# Patient Record
Sex: FEMALE | Race: WHITE | Employment: UNEMPLOYED | URBAN - METROPOLITAN AREA
[De-identification: names, ages, dates, MRNs, and addresses within clinical notes are randomized per-mention and may not be internally consistent; named-entity substitution may affect disease eponyms.]

---

## 2017-01-31 ENCOUNTER — OFFICE VISIT (OUTPATIENT)
Dept: FAMILY MEDICINE CLINIC | Age: 57
End: 2017-01-31

## 2017-01-31 VITALS
SYSTOLIC BLOOD PRESSURE: 126 MMHG | BODY MASS INDEX: 38.95 KG/M2 | WEIGHT: 242.38 LBS | HEIGHT: 66 IN | HEART RATE: 60 BPM | OXYGEN SATURATION: 98 % | TEMPERATURE: 98.1 F | RESPIRATION RATE: 16 BRPM | DIASTOLIC BLOOD PRESSURE: 80 MMHG

## 2017-01-31 DIAGNOSIS — Z98.84 HX OF GASTRIC BYPASS: ICD-10-CM

## 2017-01-31 DIAGNOSIS — M25.552 LEFT HIP PAIN: Primary | ICD-10-CM

## 2017-01-31 DIAGNOSIS — Z12.11 SCREEN FOR COLON CANCER: ICD-10-CM

## 2017-01-31 DIAGNOSIS — Z23 ENCOUNTER FOR IMMUNIZATION: ICD-10-CM

## 2017-01-31 RX ORDER — METOPROLOL SUCCINATE 25 MG/1
TABLET, EXTENDED RELEASE ORAL
COMMUNITY
Start: 2016-10-26 | End: 2017-01-31 | Stop reason: ALTCHOICE

## 2017-01-31 RX ORDER — ROSUVASTATIN CALCIUM 5 MG/1
TABLET, COATED ORAL
COMMUNITY
Start: 2016-12-07 | End: 2017-10-24 | Stop reason: SDUPTHER

## 2017-01-31 NOTE — PATIENT INSTRUCTIONS
From Natural Medicines Comprehensive Database:     Turmeric safe up to 2.2 grams daily in trials lasting up to 8 months     Commercial products Meriva, Indena 500 mg twice daily reduced pain and improved functionality in OA of the knee after 2-3 months of treatment and reduced need for NSAIDs. May be as effective as ibuprofen 400 mg twice daily.     Dyspepsia, diarrhea, distension , reflux, nausea and vomiting.

## 2017-01-31 NOTE — MR AVS SNAPSHOT
Visit Information Date & Time Provider Department Dept. Phone Encounter #  
 1/31/2017  1:15 PM Jaya Conrad MD [facility] [phone] [id number] Follow-up Instructions Return if symptoms worsen or fail to improve. Routing History Upcoming Health Maintenance Date Due  
 BREAST CANCER SCRN MAMMOGRAM 12/4/2008 FOBT Q 1 YEAR AGE 50-75 12/4/2008 INFLUENZA AGE 9 TO ADULT 8/1/2016 PAP AKA CERVICAL CYTOLOGY 10/1/2020 DTaP/Tdap/Td series (2 - Td) 3/6/2024 Allergies as of 1/31/2017  Review Complete On: 1/31/2017 By: Jaya Conrad MD  
  
 Severity Noted Reaction Type Reactions Morphine (Pf)  06/11/2013    Unknown (comments) Increases Pain- MAKES PAIN WORST Pcn [Penicillins]  06/11/2013    Unknown (comments) Unknown reaction. Current Immunizations  Reviewed on 10/1/2015 Name Date Influenza Vaccine 9/15/2012 Influenza Vaccine (Quad) PF 10/1/2015 Tdap 3/6/2014 Not reviewed this visit You Were Diagnosed With   
  
 Codes Comments Left hip pain    -  Primary ICD-10-CM: X36.456 ICD-9-CM: 719.45 Screen for colon cancer     ICD-10-CM: Z12.11 ICD-9-CM: V76.51 Vitals BP Pulse Temp Resp Height(growth percentile) 126/80 (BP 1 Location: Left arm, BP Patient Position: Sitting) 60 98.1 °F (36.7 °C) (Temporal) 16 5' 6\" (1.676 m) Weight(growth percentile) SpO2 BMI OB Status Smoking Status 242 lb 6 oz (109.9 kg) 98% 39.12 kg/m2 Menopause Former Smoker Vitals History BMI and BSA Data Body Mass Index Body Surface Area  
 39.12 kg/m 2 2.26 m 2 Preferred Pharmacy Pharmacy Name Phone [address] [facility] [phone] Your Updated Medication List  
  
   
This list is accurate as of: 1/31/17  2:10 PM.  Always use your most recent med list.  
  
  
  
  
 acetaminophen 500 mg tablet Commonly known as:  TYLENOL  
 Take  by mouth every six (6) hours as needed. acetaminophen-codeine 300-30 mg per tablet Commonly known as:  TYLENOL #3 Take 1 Tab by mouth every four (4) hours as needed for Pain. Max Daily Amount: 6 Tabs. albuterol 90 mcg/actuation inhaler Commonly known as:  PROVENTIL HFA, VENTOLIN HFA, PROAIR HFA Take 2 Puffs by inhalation every four (4) hours as needed for Wheezing. aspirin delayed-release 81 mg tablet Take 1 Tab by mouth daily. cyanocobalamin 500 mcg tablet Commonly known as:  VITAMIN B12 Take 500 mcg by mouth daily. fluticasone 50 mcg/actuation nasal spray Commonly known as:  Domnick Means 2 Sprays by Both Nostrils route daily. GENTLE IRON PO Take 27 mg by mouth daily (with breakfast). inhalational spacing device 1 Each by Does Not Apply route as needed. loratadine 10 mg tablet Commonly known as:  CLARITIN  
TAKE 1 TABLET DAILY NexIUM 40 mg capsule Generic drug:  esomeprazole TAKE 1 CAPSULE DAILY  
  
 ONE DAILY WOMENS 50 PLUS PO Take  by mouth. rosuvastatin 5 mg tablet Commonly known as:  CRESTOR  
  
 VITAMIN C 500 mg tablet Generic drug:  ascorbic acid (vitamin C) Take  by mouth. We Performed the Following REFERRAL TO ORTHOPEDICS [PAQ437 Custom] Comments:  
 Carley Jaimes is a 60 y.o. female with left hip pain and crepitus suggestive of both OA and trochanteric bursitis. history of bilateral knee replacements. Please evaluate and treat as indicated. Thank you. Follow-up Instructions Return if symptoms worsen or fail to improve. To-Do List   
 01/31/2017 Lab:  OCCULT BLOOD, IMMUNOASSAY (FIT)   
  
 01/31/2017 Imaging:  XR HIP LT W OR WO PELV 2-3 VWS Referral Information Referral ID Referred By Referred To  
  
 [id number] Nishant ORONA MD   
   [address]   
   [place], [address] Phone: [phone] Fax: [phone] Visits Status Start Date End Date 1 New Request 1/31/17 1/31/18 If your referral has a status of pending review or denied, additional information will be sent to support the outcome of this decision. Patient Instructions From Natural Medicines Comprehensive Database: 
  
Turmeric safe up to 2.2 grams daily in trials lasting up to 8 months 
  
Commercial products Meriva, Indena 500 mg twice daily reduced pain and improved functionality in OA of the knee after 2-3 months of treatment and reduced need for NSAIDs. May be as effective as ibuprofen 400 mg twice daily. 
  
Dyspepsia, diarrhea, distension , reflux, nausea and vomiting. Introducing [facility] & HEALTH SERVICES! Dear Gabriela Chavez: Thank you for requesting a [employer] account. Our records indicate that you already have an active [employer] account. You can access your account anytime at [email or web address] Did you know that you can access your hospital and ER discharge instructions at any time in [employer]? You can also review all of your test results from your hospital stay or ER visit. Additional Information If you have questions, please visit the Frequently Asked Questions section of the [employer] website at [email or web address] Remember, [employer] is NOT to be used for urgent needs. For medical emergencies, dial 911. Now available from your iPhone and Android! Please provide this summary of care documentation to your next provider. Your primary care clinician is listed as Augie Caceres. If you have any questions after today's visit, please call [phone].

## 2017-01-31 NOTE — PROGRESS NOTES
Fain Paget is a 60 y.o. female    Interim: saw Dr. Ezequiel Barba, cardiologist at [facility]. Had imaging and cath. Stable plaque, no stent required. \"nothing wrong with my heart\" Advised to continued ASA, switched to Crestor, stopped BB. Released from care. Here for back/hip pain:  2-3 weeks ago woke with pain, middle LBP with bilateral radiation to both hips, none to legs. Improved with activity moderation and topical OTC therapy. Recurred 1/16/17. Saw chiropractor with relief. Still with pain left hip/groin associated with grinding. Minimal relief with Tylenol. Worse with deep breathing. Patient Care Team:  Ha Meade MD as PCP - General (Family Practice)  Leena Liu MD (Dermatology)  Allergies   Allergen Reactions    Morphine (Pf) Unknown (comments)     Increases Pain- MAKES PAIN WORST    Pcn [Penicillins] Unknown (comments)     Unknown reaction. Outpatient Prescriptions Marked as Taking for the 1/31/17 encounter (Office Visit) with Ha Meade MD   Medication Sig Dispense Refill    rosuvastatin (CRESTOR) 5 mg tablet       aspirin delayed-release 81 mg tablet Take 1 Tab by mouth daily. 90 Tab 0    NEXIUM 40 mg capsule TAKE 1 CAPSULE DAILY 90 Cap 1    loratadine (CLARITIN) 10 mg tablet TAKE 1 TABLET DAILY 90 Tab 4    fluticasone (FLONASE) 50 mcg/actuation nasal spray 2 Sprays by Both Nostrils route daily. 1 Bottle 0    albuterol (PROVENTIL HFA, VENTOLIN HFA, PROAIR HFA) 90 mcg/actuation inhaler Take 2 Puffs by inhalation every four (4) hours as needed for Wheezing. 1 Inhaler 1    inhalational spacing device 1 Each by Does Not Apply route as needed. 1 Device 0    IRON BIS-GLYCINAT/VIT C/FA/B12 (GENTLE IRON PO) Take 27 mg by mouth daily (with breakfast).  cyanocobalamin (VITAMIN B12) 500 mcg tablet Take 500 mcg by mouth daily.  ascorbic acid (VITAMIN C) 500 mg tablet Take  by mouth.  FOLIC ACID/MULTIVITS-MIN (ONE DAILY WOMENS 50 PLUS PO) Take  by mouth.       acetaminophen (TYLENOL) 500 mg tablet Take  by mouth every six (6) hours as needed. Patient Active Problem List    Diagnosis    Hx of peptic ulcer     Notes dated 10/8/10: marginal ulcer NSAID induced. Plan for PPI x 3 months only. Dr. Sharron Duarte.  Advanced directives, counseling/discussion    Thalassemia trait    Iron deficiency anemia     GI bleed, history of some sort of ulcer, unclear if anastomotic ulcer or long term PPI plan, gastric bypass: iron infusion required 2010, EGD 2010.  Prediabetes    Hx of gastric bypass    Obesity (BMI 30-39. 9)    Gastroesophageal reflux disease without esophagitis    Allergic conjunctivitis and rhinitis     Past Medical History   Diagnosis Date    Chemical pneumonia ([facility] [address] unknown    Gall bladder disease unknown    Gastric bypass status for obesity unknown    Hernia unknown    Knee joint replacement status unknown    Muscle ache unknown    Pendulous abdomen unknown    Rosacea     Thalassemia trait     Ulcer ([facility] [address] unknown     Past Surgical History   Procedure Laterality Date    Hx cholecystectomy      Hx gyn      Hx gastric bypass      Hx orthopaedic       BILATERAL KNEE REPLACEMENT     Family History   Problem Relation Age of Onset    Cancer Mother     Heart Disease Father     Breast Cancer Neg Hx     Colon Cancer Neg Hx      Social History     Social History    Marital status:      Spouse name: N/A    Number of children: N/A    Years of education: N/A     Occupational History    Not on file. Social History Main Topics    Smoking status: Former Smoker    Smokeless tobacco: Former User     Quit date: 1/1/1986    Alcohol use 3.5 oz/week     7 Glasses of wine per week    Drug use: No    Sexual activity: No      Comment: LMP 2011     Other Topics Concern    Not on file     Social History Narrative         Review of Systems   Constitutional: Negative for fever. Genitourinary: Negative for dysuria.      Visit Vitals    BP 126/80 (BP 1 Location: Left arm, BP Patient Position: Sitting)    Pulse 60    Temp 98.1 °F (36.7 °C) (Temporal)    Resp 16    Ht 5' 6\" (1.676 m)    Wt 242 lb 6 oz (109.9 kg)    SpO2 98%    BMI 39.12 kg/m2     Physical Exam   Constitutional: She is oriented to person, place, and time. She appears well-developed. No distress. Pleasant obese white female   HENT:   Head: Normocephalic and atraumatic. Pulmonary/Chest: Effort normal.   Musculoskeletal:        Left hip: She exhibits normal range of motion. Left hip: Tenderness over greater trochanter   Neurological: She is alert and oriented to person, place, and time. She has normal strength and normal reflexes. No sensory deficit. Psychiatric: She has a normal mood and affect. Her behavior is normal. Judgment and thought content normal.         ICD-10-CM ICD-9-CM    1. Left hip pain M25.552 719.45 REFERRAL TO ORTHOPEDICS      XR HIP LT W OR WO PELV 2-3 VWS   2. Screen for colon cancer Z12.11 V76.51 OCCULT BLOOD, IMMUNOASSAY (FIT)   3. Encounter for immunization Z23 V03.89 INFLUENZA VIRUS VAC QUAD,SPLIT,PRESV FREE SYRINGE 3/> YRS IM     Presentation suggests both trochanteric bursitis and osteoarthritis of the hip  Chronic NSAIDs unwise with history of gastric bypass as well as with long-term aspirin therapy. Recommended trial of turmeric pending imaging and referral as it has been shown to be safe and effective for mild to moderate ostial arthritis. The patient understands our medical plan. Alternatives have been explained and offered. The risks, benefits and significant side effects of all medications have been reviewed. All questions have been answered. She is encouraged to employ the information provided in the after visit summary, which was reviewed.       She is instructed to call the clinic if she has not been notified either by phone or through [address] with the results of her tests or with an appointment plan for any referrals within 1 week(s). No news is not good news; it's no news. The patient  is to call if her condition worsens or fails to improve or if significant side effects are experienced. Follow-up Disposition:  Return if symptoms worsen or fail to improve. Dragon medical dictation software was used for portions of this report. Unintended voice recognition errors may occur.

## 2017-02-08 ENCOUNTER — TELEPHONE (OUTPATIENT)
Dept: FAMILY MEDICINE CLINIC | Age: 57
End: 2017-02-08

## 2017-02-08 DIAGNOSIS — M54.5 LOW BACK PAIN, UNSPECIFIED BACK PAIN LATERALITY, UNSPECIFIED CHRONICITY, WITH SCIATICA PRESENCE UNSPECIFIED: ICD-10-CM

## 2017-02-08 DIAGNOSIS — Z98.84 HX OF GASTRIC BYPASS: Primary | ICD-10-CM

## 2017-02-08 DIAGNOSIS — Z13.820 SCREENING FOR OSTEOPOROSIS: ICD-10-CM

## 2017-02-08 NOTE — TELEPHONE ENCOUNTER
Patient called and asked to speak with Nida Saini. She states she was supposed to call because she still hasn't heard anything back on the xray. She can be reached at [phone].

## 2017-02-08 NOTE — TELEPHONE ENCOUNTER
Called patient back she is asking if she can also get an order to have x-ray of her lower back says she has been having lower back pain, patient is also asking if she can get an order placed to have a Dexa scan done due to history of gastric bypass surgery. Please advise.

## 2017-02-09 NOTE — TELEPHONE ENCOUNTER
Called patient had her verify her  she has been told the order for x-ray of her lower back as well as the Dexa scan have been placed made aware Dr. Seth Garcia advises that she call her insurance company to make sure this is covered before the age of 59 which is when Dr. Seth Garcia would recommend the study be done. She has expressed understanding and agrees with this plan asked that the orders be printed for her to  at the office on tomorrow.

## 2017-02-09 NOTE — TELEPHONE ENCOUNTER
Please encourage her to contact her insurance company to determine whether or not the bone density testing would be covered prior to the age of 59, which is when I would recommend she have this test done. I have no problem with her having it done beforehand so long as she understands what her out-of-pocket expenses will be.   REX

## 2017-03-12 DIAGNOSIS — K21.9 GASTROESOPHAGEAL REFLUX DISEASE WITHOUT ESOPHAGITIS: ICD-10-CM

## 2017-03-17 NOTE — TELEPHONE ENCOUNTER
Spoke with patient. She has not had GI scheduled, she said with everything going on with her , it wasn't done. She does need her Nexium. I told her I would send message to Dr. Cristian Gallagher and let her know. Pt also asked if the upper/lower endoscopy be ordered again?

## 2017-03-21 RX ORDER — ESOMEPRAZOLE MAGNESIUM 40 MG/1
CAPSULE, DELAYED RELEASE ORAL
Qty: 90 CAP | Refills: 0 | Status: SHIPPED | OUTPATIENT
Start: 2017-03-21 | End: 2017-09-21 | Stop reason: CLARIF

## 2017-03-21 NOTE — TELEPHONE ENCOUNTER
Lisa,  Here are her dx codes and referral description. Please call GI to see whom among the group would feel comfortable scoping upper and lower tracts OR if they would prefer her referred to Gen Surg. Diagnoses:  280.9 (ICD-9-CM) - Iron deficiency anemia  V12.79 (ICD-9-CM) - History of GI bleed  V45.86 (ICD-9-CM) - Hx of gastric bypass  V76.51 (ICD-9-CM) - Screen for colon cancer    63 yo female with history gastric bypass and history of of GI bleed, unclear PUD vs anastomotic vs other. Last EDG 2010. Please evaluate and treat as indicated. Also due for colon cancer screening Thank you.

## 2017-04-12 ENCOUNTER — TELEPHONE (OUTPATIENT)
Dept: FAMILY MEDICINE CLINIC | Age: 57
End: 2017-04-12

## 2017-04-12 NOTE — TELEPHONE ENCOUNTER
Pt called asking if Dr. Cheryl Mcmahan could do a second opinion referral for her right arm. She said that 'Berenice Beltran is useless'. And she feels that they are 'brushing her pain off'. She has been doing exercises she received from PT, but it's not helping. Describes the pain 'as if someone is taking a knife down her arm'. I told her that I would send a message to Dr. Cheryl Mcmahan. Pt expressed clear understanding and had no further questions.

## 2017-04-13 NOTE — TELEPHONE ENCOUNTER
Myla Jimenez, it has been almost 6 months since I saw her for that problem. Please request the records from [facility] and come see me so that I can best assess to whom to refer her.   Thank you, REX

## 2017-04-13 NOTE — TELEPHONE ENCOUNTER
Patient has been scheduled for 4/21. Pt stated today that she has not seen a doctor at [facility]. She never 'got that far', because she was told nothing was really seen on the MRI. Pt is doing her shoulder exercises that were give to her by PT, nightly, but she is having pain still.

## 2017-04-13 NOTE — TELEPHONE ENCOUNTER
Please obtain MRI report and PT notes for visit. Advise patient to obtain a disc with the MRI images - they won't be useful to me, but will be useful to subspecialists.  REX

## 2017-04-21 ENCOUNTER — OFFICE VISIT (OUTPATIENT)
Dept: FAMILY MEDICINE CLINIC | Age: 57
End: 2017-04-21

## 2017-04-21 VITALS
WEIGHT: 239.2 LBS | RESPIRATION RATE: 12 BRPM | HEIGHT: 66 IN | BODY MASS INDEX: 38.44 KG/M2 | SYSTOLIC BLOOD PRESSURE: 110 MMHG | HEART RATE: 68 BPM | TEMPERATURE: 98.1 F | DIASTOLIC BLOOD PRESSURE: 60 MMHG | OXYGEN SATURATION: 98 %

## 2017-04-21 DIAGNOSIS — Z87.39 HISTORY OF ROTATOR CUFF TEAR: ICD-10-CM

## 2017-04-21 DIAGNOSIS — M25.511 CHRONIC RIGHT SHOULDER PAIN: Primary | ICD-10-CM

## 2017-04-21 DIAGNOSIS — G89.29 CHRONIC RIGHT SHOULDER PAIN: Primary | ICD-10-CM

## 2017-04-21 NOTE — MR AVS SNAPSHOT
Visit Information Date & Time Provider Department Dept. Phone Encounter #  
 4/21/2017  1:00 PM Abbey Merino MD [facility] [phone] [id number] Follow-up Instructions Return if symptoms worsen or fail to improve. Upcoming Health Maintenance Date Due FOBT Q 1 YEAR AGE 50-75 12/4/2008 BREAST CANCER SCRN MAMMOGRAM 10/16/2015 PAP AKA CERVICAL CYTOLOGY 10/1/2020 DTaP/Tdap/Td series (2 - Td) 3/6/2024 Allergies as of 4/21/2017  Review Complete On: 4/21/2017 By: Abbey Merino MD  
  
 Severity Noted Reaction Type Reactions Morphine (Pf)  06/11/2013    Unknown (comments) Increases Pain- MAKES PAIN WORST Pcn [Penicillins]  06/11/2013    Unknown (comments) Unknown reaction. Current Immunizations  Reviewed on 10/1/2015 Name Date Influenza Vaccine 9/15/2012 Influenza Vaccine (Quad) PF 1/31/2017, 10/1/2015 Tdap 3/6/2014 Not reviewed this visit You Were Diagnosed With   
  
 Codes Comments Chronic right shoulder pain    -  Primary ICD-10-CM: M25.511, G89.29 ICD-9-CM: 719.41, 338.29 History of rotator cuff tear     ICD-10-CM: Z87.39 
ICD-9-CM: V13.59 Vitals BP Pulse Temp Resp Height(growth percentile) Weight(growth percentile) 110/60 (BP 1 Location: Left arm, BP Patient Position: Sitting) 68 98.1 °F (36.7 °C) (Oral) 12 5' 6\" (1.676 m) 239 lb 3.2 oz (108.5 kg) SpO2 BMI OB Status Smoking Status 98% 38.61 kg/m2 Menopause Former Smoker Vitals History BMI and BSA Data Body Mass Index Body Surface Area  
 38.61 kg/m 2 2.25 m 2 Preferred Pharmacy Pharmacy Name Phone [facility] [phone] Your Updated Medication List  
  
   
This list is accurate as of: 4/21/17  1:49 PM.  Always use your most recent med list.  
  
  
  
  
 acetaminophen 500 mg tablet Commonly known as:  TYLENOL  
 Take  by mouth every six (6) hours as needed. aspirin delayed-release 81 mg tablet Take 1 Tab by mouth daily. cyanocobalamin 500 mcg tablet Commonly known as:  VITAMIN B12 Take 500 mcg by mouth daily. GENTLE IRON PO Take 27 mg by mouth daily (with breakfast). loratadine 10 mg tablet Commonly known as:  CLARITIN  
TAKE 1 TABLET DAILY NexIUM 40 mg capsule Generic drug:  esomeprazole TAKE 1 CAPSULE DAILY  
  
 ONE DAILY WOMENS 50 PLUS PO Take  by mouth. rosuvastatin 5 mg tablet Commonly known as:  CRESTOR  
  
 VITAMIN C 500 mg tablet Generic drug:  ascorbic acid (vitamin C) Take  by mouth. We Performed the Following REFERRAL TO ORTHOPEDIC SURGERY [REF62 Custom] Comments:  
 Second opinion: 
 
Devin Shabazz is a 60 y.o. female with prior history of rotator cuff tear right shoulder sustained new injury 2015. Chronic progressive pain despite PT. Please evaluate and treat as indicated. Thank you. Follow-up Instructions Return if symptoms worsen or fail to improve. Referral Information Referral ID Referred By Referred To  
  
 [id number] Aurora ORONA MD   
   [address] [place] [address] [address] [facility] Phone: [phone] Fax: [phone] Visits Status Start Date End Date 1 New Request 4/21/17 4/21/18 If your referral has a status of pending review or denied, additional information will be sent to support the outcome of this decision. Introducing \[facility]\"" & HEALTH SERVICES! Dear Mary Hayes: Thank you for requesting a [employer] account. Our records indicate that you already have an active [employer] account. You can access your account anytime at [email or web address] Did you know that you can access your hospital and ER discharge instructions at any time in [employer]?   You can also review all of your test results from your hospital stay or ER visit. Additional Information If you have questions, please visit the Frequently Asked Questions section of the [employer] website at [email or web address] Remember, [employer] is NOT to be used for urgent needs. For medical emergencies, dial 911. Now available from your iPhone and Android! Please provide this summary of care documentation to your next provider. Your primary care clinician is listed as Flaquito Dietrich. If you have any questions after today's visit, please call [phone].

## 2017-04-21 NOTE — PROGRESS NOTES
Patient here for f/u on her right shoulder pain that she has been having on and off x 3 years. 1. Have you been to the ER, urgent care clinic since your last visit? Hospitalized since your last visit? No    2. Have you seen or consulted any other health care providers outside of the [address] [place] [address] since your last visit? Include any pap smears or colon screening.  No

## 2017-04-21 NOTE — PROGRESS NOTES
Lenka Menjivar is a 60 y.o. female      Right Shoulder Pain  Visit notes from 11/20/2015:    \"Patient complains of right side shoulder pain. The symptoms began 3 weeks ago Course of symptoms since onset has been stable. Pain is described as overall severity = severe and knife like, radiating down lateral aspect upper arm. Triggered with heavy lifting and elevation at the shoulder. Symptoms developed after round trip motorcycle ride to [employer]. Sustained rotator cuff tear 2 years ago after she \"dropped her bike\" and landed on that shoulder. Pain resolved with PT and did well until this episode. \"     \"Excellent experience at [facility] for knee surgery. Desires return there. \"    Today reports she subseqeuntly saw a PA in the ortho department. MRI ordered and simultaneously referred to PT for strengthening prior to anticipated surgery. Still pending the MRI, she was also sent somewhere else (interventional radiology?) for an image guided injection. Reports the clinician commented her biceps tendon was the size of a quarter at the time of the intraarticular injection. It was helpful. After MRI done, the PA indicated the results showed normal rotator cuff. Completed another couple of weeks of PT after that. When she was released \"I felt great. \" Continued HEP. Did well until 2 months ago. Acute onset sharp pain lateral aspect of the shoulder without trauma. Episodic and progressively worsening since then. Triggered with traction on shoulder as with lifting/moving heavy objects or with repetitive activity of HEP. Progressively decreasing ROM and ability to engage in her HEP. Used to be able to complete 15-20 repetitions. Now has to stop after 5 due sharp pain.       Patient Care Team:  Godwin Charlton MD as PCP - General (Family Practice)  Sara Kelly MD (Dermatology)  Allergies   Allergen Reactions    Morphine (Pf) Unknown (comments)     Increases Pain- MAKES PAIN WORST    Pcn [Penicillins] Unknown (comments)     Unknown reaction. Outpatient Prescriptions Marked as Taking for the 4/21/17 encounter (Office Visit) with Roseline Archer MD   Medication Sig Dispense Refill    NEXIUM 40 mg capsule TAKE 1 CAPSULE DAILY 90 Cap 0    rosuvastatin (CRESTOR) 5 mg tablet       aspirin delayed-release 81 mg tablet Take 1 Tab by mouth daily. 90 Tab 0    loratadine (CLARITIN) 10 mg tablet TAKE 1 TABLET DAILY 90 Tab 4    IRON BIS-GLYCINAT/VIT C/FA/B12 (GENTLE IRON PO) Take 27 mg by mouth daily (with breakfast).  cyanocobalamin (VITAMIN B12) 500 mcg tablet Take 500 mcg by mouth daily.  ascorbic acid (VITAMIN C) 500 mg tablet Take  by mouth.  FOLIC ACID/MULTIVITS-MIN (ONE DAILY WOMENS 50 PLUS PO) Take  by mouth.  acetaminophen (TYLENOL) 500 mg tablet Take  by mouth every six (6) hours as needed. Patient Active Problem List    Diagnosis    Hx of peptic ulcer     Notes dated 10/8/10: marginal ulcer NSAID induced. Plan for PPI x 3 months only. Dr. Martine Cooks.  Advanced directives, counseling/discussion    Thalassemia trait    Iron deficiency anemia     GI bleed, history of some sort of ulcer, unclear if anastomotic ulcer or long term PPI plan, gastric bypass: iron infusion required 2010, EGD 2010.  Prediabetes    Hx of gastric bypass    Obesity (BMI 30-39. 9)    Gastroesophageal reflux disease without esophagitis    Allergic conjunctivitis and rhinitis     Past Medical History:   Diagnosis Date    Chemical pneumonia ([address] unknown    Gall bladder disease unknown    Gastric bypass status for obesity unknown    Hernia unknown    Knee joint replacement status unknown    Muscle ache unknown    Pendulous abdomen unknown    Rosacea     Thalassemia trait     Ulcer ([address] unknown     Past Surgical History:   Procedure Laterality Date    HX CHOLECYSTECTOMY      HX GASTRIC BYPASS      HX GYN      HX ORTHOPAEDIC      BILATERAL KNEE REPLACEMENT     Family History   Problem Relation Age of Onset    Cancer Mother     Heart Disease Father     Breast Cancer Neg Hx     Colon Cancer Neg Hx      Social History     Social History    Marital status:      Spouse name: N/A    Number of children: N/A    Years of education: N/A     Occupational History    Not on file. Social History Main Topics    Smoking status: Former Smoker    Smokeless tobacco: Former User     Quit date: 1/1/1986    Alcohol use 3.5 oz/week     7 Glasses of wine per week    Drug use: No    Sexual activity: No      Comment: LMP 2011     Other Topics Concern    Not on file     Social History Narrative         Review of Systems   Neurological: Negative for sensory change and focal weakness. Visit Vitals    /60 (BP 1 Location: Left arm, BP Patient Position: Sitting)    Pulse 68    Temp 98.1 °F (36.7 °C) (Oral)    Resp 12    Ht 5' 6\" (1.676 m)    Wt 239 lb 3.2 oz (108.5 kg)    SpO2 98%    BMI 38.61 kg/m2     Physical Exam   Constitutional: She is oriented to person, place, and time. She appears well-developed. No distress. Pleasant obese white female   HENT:   Head: Normocephalic and atraumatic. Pulmonary/Chest: Effort normal.   Musculoskeletal:        Right shoulder: She exhibits decreased range of motion. She exhibits no deformity. Neurological: She is alert and oriented to person, place, and time. Psychiatric: She has a normal mood and affect. Her behavior is normal. Judgment and thought content normal.         ICD-10-CM ICD-9-CM    1. Chronic right shoulder pain M25.511 719.41 REFERRAL TO ORTHOPEDIC SURGERY    G89.29 338.29    2. History of rotator cuff tear Z87.39 V13.59 REFERRAL TO ORTHOPEDIC SURGERY       Instructed to bring disk with MRI images to her appointment. Meanwhile, avoid aggravating activities. Referring to provider of patient's request.    The patient understands our medical plan. Alternatives have been explained and offered.   All questions have been answered. She is instructed to call the clinic if she has not been notified either by phone or through [address] with the results of her tests or with an appointment plan for any referrals within 1 week(s). No news is not good news; it's no news. Follow-up Disposition:  Return if symptoms worsen or fail to improve.

## 2017-06-08 ENCOUNTER — OFFICE VISIT (OUTPATIENT)
Dept: FAMILY MEDICINE CLINIC | Age: 57
End: 2017-06-08

## 2017-06-08 VITALS
TEMPERATURE: 98.1 F | RESPIRATION RATE: 14 BRPM | BODY MASS INDEX: 37.77 KG/M2 | DIASTOLIC BLOOD PRESSURE: 70 MMHG | SYSTOLIC BLOOD PRESSURE: 108 MMHG | HEART RATE: 76 BPM | WEIGHT: 235 LBS | HEIGHT: 66 IN | OXYGEN SATURATION: 97 %

## 2017-06-08 DIAGNOSIS — S80.12XA CONTUSION OF LEG, LEFT, INITIAL ENCOUNTER: Primary | ICD-10-CM

## 2017-06-08 NOTE — MR AVS SNAPSHOT
Visit Information Date & Time Provider Department Dept. Phone Encounter #  
 6/8/2017 11:15 AM Analisa Norotn MD [facility] [phone] [id number] Follow-up Instructions Return if symptoms worsen or fail to improve. Upcoming Health Maintenance Date Due FOBT Q 1 YEAR AGE 50-75 12/4/2008 BREAST CANCER SCRN MAMMOGRAM 10/16/2015 INFLUENZA AGE 9 TO ADULT 8/1/2017 PAP AKA CERVICAL CYTOLOGY 10/1/2020 DTaP/Tdap/Td series (2 - Td) 3/6/2024 Allergies as of 6/8/2017  Review Complete On: 6/8/2017 By: Analisa Norton MD  
  
 Severity Noted Reaction Type Reactions Morphine (Pf)  06/11/2013    Unknown (comments) Increases Pain- MAKES PAIN WORST Pcn [Penicillins]  06/11/2013    Unknown (comments) Unknown reaction. Current Immunizations  Reviewed on 10/1/2015 Name Date Influenza Vaccine 9/15/2012 Influenza Vaccine (Quad) PF 1/31/2017, 10/1/2015 Tdap 3/6/2014 Not reviewed this visit You Were Diagnosed With   
  
 Codes Comments Contusion of leg, left, initial encounter    -  Primary ICD-10-CM: H84.90JQ ICD-9-CM: 924.5 Vitals BP Pulse Temp Resp Height(growth percentile) Weight(growth percentile) 108/70 (BP 1 Location: Left arm, BP Patient Position: Sitting) 76 98.1 °F (36.7 °C) (Oral) 14 5' 6\" (1.676 m) 235 lb (106.6 kg) SpO2 BMI OB Status Smoking Status 97% 37.93 kg/m2 Menopause Former Smoker Vitals History BMI and BSA Data Body Mass Index Body Surface Area  
 37.93 kg/m 2 2.23 m 2 Preferred Pharmacy Pharmacy Name Phone [address] [facility] [phone] Your Updated Medication List  
  
   
This list is accurate as of: 6/8/17 12:02 PM.  Always use your most recent med list.  
  
  
  
  
 acetaminophen 500 mg tablet Commonly known as:  TYLENOL Take  by mouth every six (6) hours as needed. aspirin delayed-release 81 mg tablet Take 1 Tab by mouth daily. cyanocobalamin 500 mcg tablet Commonly known as:  VITAMIN B12 Take 500 mcg by mouth daily. GENTLE IRON PO Take 27 mg by mouth daily (with breakfast). loratadine 10 mg tablet Commonly known as:  CLARITIN  
TAKE 1 TABLET DAILY NexIUM 40 mg capsule Generic drug:  esomeprazole TAKE 1 CAPSULE DAILY  
  
 ONE DAILY WOMENS 50 PLUS PO Take  by mouth. rosuvastatin 5 mg tablet Commonly known as:  CRESTOR  
  
 VITAMIN C 500 mg tablet Generic drug:  ascorbic acid (vitamin C) Take  by mouth. Follow-up Instructions Return if symptoms worsen or fail to improve. Patient Instructions Contusion: Care Instructions Your Care Instructions Contusion is the medical term for a bruise. It is the result of a direct blow or an impact, such as a fall. Contusions are common sports injuries. Most people think of a bruise as a black-and-blue spot. This happens when small blood vessels get torn and leak blood under the skin. But bones, muscles, and organs can also get bruised. This may damage deep tissues but not cause a bruise you can see. The doctor will do a physical exam to find the location of your contusion. You may also have tests to make sure you do not have a more serious injury, such as a broken bone or nerve damage. These may include X-rays or other imaging tests like a CT scan or MRI. Deep-tissue contusions may cause pain and swelling. But if there is no serious damage, they will often get better in a few weeks with home treatment. The doctor has checked you carefully, but problems can develop later. If you notice any problems or new symptoms, get medical treatment right away. Follow-up care is a key part of your treatment and safety.  Be sure to make and go to all appointments, and call your doctor if you are having problems. It's also a good idea to know your test results and keep a list of the medicines you take. How can you care for yourself at home? · Put ice or a cold pack on the sore area for 10 to 20 minutes at a time to stop swelling. Put a thin cloth between the ice pack and your skin. · Be safe with medicines. Read and follow all instructions on the label. ¨ If the doctor gave you a prescription medicine for pain, take it as prescribed. ¨ If you are not taking a prescription pain medicine, ask your doctor if you can take an over-the-counter medicine. · If you can, prop up the sore area on pillows as much as possible for the next few days. Try to keep the sore area above the level of your heart. When should you call for help? Call your doctor now or seek immediate medical care if: 
· Your pain gets worse. · You have new or worse swelling. · You have tingling, weakness, or numbness in the area near the contusion. · The area near the contusion is cold or pale. Watch closely for changes in your health, and be sure to contact your doctor if: 
· You do not get better as expected. Where can you learn more? Go to http://maury-haven.info/. Enter Y257 in the search box to learn more about \"Contusion: Care Instructions. \" Current as of: May 27, 2016 Content Version: 11.2 © 0029-8448 Plusmo. Care instructions adapted under license by frenting (which disclaims liability or warranty for this information). If you have questions about a medical condition or this instruction, always ask your healthcare professional. [place] [address] any warranty or liability for your use of this information. Introducing [facility]! Dear Benitez Jefferson: Thank you for requesting a Venuefox account. Our records indicate that you already have an active Venuefox account. You can access your account anytime at [email or web address] Did you know that you can access your hospital and ER discharge instructions at any time in Jiff? You can also review all of your test results from your hospital stay or ER visit. Additional Information If you have questions, please visit the Frequently Asked Questions section of the Jiff website at [email or web address] Remember, Jiff is NOT to be used for urgent needs. For medical emergencies, dial 911. Now available from your iPhone and Android! Please provide this summary of care documentation to your next provider. Your primary care clinician is listed as Braeden Mora. If you have any questions after today's visit, please call [phone].

## 2017-06-08 NOTE — PATIENT INSTRUCTIONS

## 2017-06-08 NOTE — PROGRESS NOTES
C/o left leg pain she states she was putting her bike on their trailer when injured her left shin,  Has a mass there that is getting better but has not resolved. She had bruising that has resolved. 1. Have you been to the ER, urgent care clinic since your last visit? Hospitalized since your last visit? No    2. Have you seen or consulted any other health care providers outside of the [address] [place] [address] since your last visit? Include any pap smears or colon screening. No  Health Maintenance reviewed - mammogram is overdue patient states she is aware , she also needs a FIT test. .

## 2017-06-08 NOTE — PROGRESS NOTES
Scarlett Snellen is a 60 y.o. female     Injured left shin 5/12/2017 jammed the leading edge of a ramp into it. Immediate swelling, gradually developed dependent ecchymosis. Improving. Worries about risk for VTE      Patient Care Team:  Emmanuel Mercer MD as PCP - General (Family Practice)  Selena Parks MD (Dermatology)  Allergies   Allergen Reactions    Morphine (Pf) Unknown (comments)     Increases Pain- MAKES PAIN WORST    Pcn [Penicillins] Unknown (comments)     Unknown reaction. Outpatient Prescriptions Marked as Taking for the 6/8/17 encounter (Office Visit) with Emmanuel Mercer MD   Medication Sig Dispense Refill    NEXIUM 40 mg capsule TAKE 1 CAPSULE DAILY 90 Cap 0    rosuvastatin (CRESTOR) 5 mg tablet       aspirin delayed-release 81 mg tablet Take 1 Tab by mouth daily. 90 Tab 0    loratadine (CLARITIN) 10 mg tablet TAKE 1 TABLET DAILY 90 Tab 4    IRON BIS-GLYCINAT/VIT C/FA/B12 (GENTLE IRON PO) Take 27 mg by mouth daily (with breakfast).  cyanocobalamin (VITAMIN B12) 500 mcg tablet Take 500 mcg by mouth daily.  ascorbic acid (VITAMIN C) 500 mg tablet Take  by mouth.  FOLIC ACID/MULTIVITS-MIN (ONE DAILY WOMENS 50 PLUS PO) Take  by mouth.  acetaminophen (TYLENOL) 500 mg tablet Take  by mouth every six (6) hours as needed. Patient Active Problem List    Diagnosis    Hx of peptic ulcer     Notes dated 10/8/10: marginal ulcer NSAID induced. Plan for PPI x 3 months only. Dr. Francisco Carias.  Advanced directives, counseling/discussion    Thalassemia trait    Iron deficiency anemia     GI bleed, history of some sort of ulcer, unclear if anastomotic ulcer or long term PPI plan, gastric bypass: iron infusion required 2010, EGD 2010.  Prediabetes    Hx of gastric bypass    Obesity (BMI 30-39. 9)    Gastroesophageal reflux disease without esophagitis    Allergic conjunctivitis and rhinitis     Past Medical History:   Diagnosis Date    Chemical pneumonia ([address] unknown    Gall bladder disease unknown    Gastric bypass status for obesity unknown    Hernia unknown    Knee joint replacement status unknown    Muscle ache unknown    Pendulous abdomen unknown    Rosacea     Thalassemia trait     Ulcer ([address] unknown     Past Surgical History:   Procedure Laterality Date    HX CHOLECYSTECTOMY      HX GASTRIC BYPASS      HX GYN      HX ORTHOPAEDIC      BILATERAL KNEE REPLACEMENT     Family History   Problem Relation Age of Onset    Cancer Mother     Heart Disease Father     Breast Cancer Neg Hx     Colon Cancer Neg Hx      Social History     Social History    Marital status:      Spouse name: N/A    Number of children: N/A    Years of education: N/A     Occupational History    Not on file. Social History Main Topics    Smoking status: Former Smoker    Smokeless tobacco: Former User     Quit date: 1/1/1986    Alcohol use 3.5 oz/week     7 Glasses of wine per week    Drug use: No    Sexual activity: No      Comment: LMP 2011     Other Topics Concern    Not on file     Social History Narrative         Review of Systems   Neurological: Negative for focal weakness. Visit Vitals    /70 (BP 1 Location: Left arm, BP Patient Position: Sitting)    Pulse 76    Temp 98.1 °F (36.7 °C) (Oral)    Resp 14    Ht 5' 6\" (1.676 m)    Wt 235 lb (106.6 kg)    SpO2 97%    BMI 37.93 kg/m2     Physical Exam   Constitutional: She is oriented to person, place, and time. She appears well-developed. No distress. Pleasant obese white female   HENT:   Head: Normocephalic and atraumatic. Cardiovascular:   Neg Homans sign   Pulmonary/Chest: Effort normal.   Musculoskeletal:        Left knee: She exhibits no deformity. No tenderness found. Left ankle: She exhibits no deformity. No tenderness. Legs:  Posterior LE NT   Neurological: She is alert and oriented to person, place, and time. Skin: No ecchymosis and no laceration noted. Psychiatric: She has a normal mood and affect. Her behavior is normal. Judgment and thought content normal.         ICD-10-CM ICD-9-CM    1. Contusion of leg, left, initial encounter S80.12XA 924.5    Reassurance  No risk VTE    The patient understands our medical plan. All questions have been answered. Follow-up Disposition:  Return if symptoms worsen or fail to improve. Dragon medical dictation software was used for portions of this report. Unintended voice recognition errors may occur.

## 2017-06-23 ENCOUNTER — TELEPHONE (OUTPATIENT)
Dept: FAMILY MEDICINE CLINIC | Age: 57
End: 2017-06-23

## 2017-06-23 NOTE — TELEPHONE ENCOUNTER
Gastroenterology called Mrs. Steele and she has some questions about a list they provided her with. She would like a nurse to return her call [phone].

## 2017-07-20 ENCOUNTER — TELEPHONE (OUTPATIENT)
Dept: FAMILY MEDICINE CLINIC | Age: 57
End: 2017-07-20

## 2017-07-20 NOTE — TELEPHONE ENCOUNTER
Patient called the office stating she has surgery scheduled at the end of the month with Dr. Lisa Johnson she had called his office asking for pain medication and was advised to call PCP due to her hx of gastric bypass patient told she will need to be seen in the office to discuss pain control and has been scheduled for 7/21/17 at 11:30 AM.

## 2017-07-21 ENCOUNTER — OFFICE VISIT (OUTPATIENT)
Dept: FAMILY MEDICINE CLINIC | Age: 57
End: 2017-07-21

## 2017-07-21 VITALS
OXYGEN SATURATION: 98 % | DIASTOLIC BLOOD PRESSURE: 88 MMHG | SYSTOLIC BLOOD PRESSURE: 124 MMHG | HEART RATE: 64 BPM | WEIGHT: 241.6 LBS | RESPIRATION RATE: 14 BRPM | TEMPERATURE: 98.1 F | BODY MASS INDEX: 39 KG/M2

## 2017-07-21 DIAGNOSIS — M25.511 ACUTE PAIN OF RIGHT SHOULDER: ICD-10-CM

## 2017-07-21 DIAGNOSIS — M75.101 TEAR OF RIGHT ROTATOR CUFF, UNSPECIFIED TEAR EXTENT: ICD-10-CM

## 2017-07-21 RX ORDER — OXYCODONE AND ACETAMINOPHEN 2.5; 325 MG/1; MG/1
1-2 TABLET ORAL
Qty: 60 TAB | Refills: 0 | Status: SHIPPED | OUTPATIENT
Start: 2017-07-21 | End: 2017-07-28 | Stop reason: SDUPTHER

## 2017-07-21 NOTE — PROGRESS NOTES
Jennifer Acuna is a 60 y.o. female    Jennifer Acuna presents due to pain of her right shoulder that is secondary to: she was trying to clear out her spare bedroom on Saturday when she \"wrenched\" it. Since this event her pain has worsened. She describes the pain as aching. Since this event she is not able to do  her normal daily activities, interfering with sleep. She reports the following adverse side effects from treatment: none with Percocet when used previously. Diagnosed with rotator cuff tear right side. She is scheduled for surgery for 8/29/17 with Dr. Danielle Strauss. Scheduled to see him in 5 days to review results of recent MRI    Prior records: available records have been reviewed & have requested outside records. Aberrant behaviors: None.  reviewed: yes and shows no suspicious activity. Concomitant use of a benzodiazepine: no        Patient Care Team:  Max Mas MD as PCP - General (Family Practice)  Jeevan Hobbs MD (Dermatology)  Allergies   Allergen Reactions    Morphine (Pf) Unknown (comments)     Increases Pain- MAKES PAIN WORST    Pcn [Penicillins] Unknown (comments)     Unknown reaction. Outpatient Prescriptions Marked as Taking for the 7/21/17 encounter (Office Visit) with Max Mas MD   Medication Sig Dispense Refill    OTHER Tumeric PO      NEXIUM 40 mg capsule TAKE 1 CAPSULE DAILY 90 Cap 0    rosuvastatin (CRESTOR) 5 mg tablet       aspirin delayed-release 81 mg tablet Take 1 Tab by mouth daily. 90 Tab 0    loratadine (CLARITIN) 10 mg tablet TAKE 1 TABLET DAILY 90 Tab 4    IRON BIS-GLYCINAT/VIT C/FA/B12 (GENTLE IRON PO) Take 27 mg by mouth daily (with breakfast).  cyanocobalamin (VITAMIN B12) 500 mcg tablet Take 500 mcg by mouth daily.  ascorbic acid (VITAMIN C) 500 mg tablet Take  by mouth.  FOLIC ACID/MULTIVITS-MIN (ONE DAILY WOMENS 50 PLUS PO) Take  by mouth.       acetaminophen (TYLENOL) 500 mg tablet Take  by mouth every six (6) hours as needed. Patient Active Problem List    Diagnosis    Hx of peptic ulcer     Notes dated 10/8/10: marginal ulcer NSAID induced. Plan for PPI x 3 months only. Dr. Jose Brown.  Advanced directives, counseling/discussion    Thalassemia trait    Iron deficiency anemia     GI bleed, history of some sort of ulcer, unclear if anastomotic ulcer or long term PPI plan, gastric bypass: iron infusion required 2010, EGD 2010.  Prediabetes    Hx of gastric bypass    Obesity (BMI 30-39. 9)    Gastroesophageal reflux disease without esophagitis    Allergic conjunctivitis and rhinitis     Past Medical History:   Diagnosis Date    [address] pneumonia [address] unknown    Gall bladder disease unknown    Gastric bypass status for obesity unknown    Hernia unknown    Knee joint replacement status unknown    Muscle ache unknown    Pendulous abdomen unknown    Rosacea     Thalassemia trait     Ulcer ([facility] unknown     Past Surgical History:   Procedure Laterality Date    HX CHOLECYSTECTOMY      HX GASTRIC BYPASS      HX GYN      HX ORTHOPAEDIC      BILATERAL KNEE REPLACEMENT     Family History   Problem Relation Age of Onset    Cancer Mother     Heart Disease Father     Breast Cancer Neg Hx     Colon Cancer Neg Hx      Social History     Social History    Marital status:      Spouse name: N/A    Number of children: N/A    Years of education: N/A     Occupational History    Not on file. Social History Main Topics    Smoking status: Former Smoker    Smokeless tobacco: Former User     Quit date: 1/1/1986    Alcohol use 3.5 oz/week     7 Glasses of wine per week    Drug use: No    Sexual activity: No      Comment: LMP 2011     Other Topics Concern    Not on file     Social History Narrative         Review of Systems   Neurological: Negative for sensory change.      Visit Vitals    /88 (BP 1 Location: Left arm, BP Patient Position: Sitting)    Pulse 64    Temp 98.1 °F (36.7 °C) (Oral)    Resp 14    Wt 241 lb 9.6 oz (109.6 kg)    SpO2 98%    BMI 39 kg/m2     Physical Exam   Constitutional: She is oriented to person, place, and time. She appears well-developed. No distress. Pleasant obese white female   HENT:   Head: Normocephalic and atraumatic. Pulmonary/Chest: Effort normal.   Musculoskeletal:        Right shoulder: She exhibits no deformity. Right hand: Normal sensation noted. She exhibits no finger abduction, no thumb/finger opposition and no wrist extension trouble. Splinting right arm close to side   Neurological: She is alert and oriented to person, place, and time. Psychiatric: She has a normal mood and affect. Her behavior is normal. Judgment and thought content normal.         ICD-10-CM ICD-9-CM    1. Acute pain of right shoulder M25.511 719.41 oxyCODONE-acetaminophen (PERCOCET) 2.5-325 mg per tablet   2. Tear of right rotator cuff, unspecified tear extent M75.101 840.4 oxyCODONE-acetaminophen (PERCOCET) 2.5-325 mg per tablet       Acute exacerbation of chronic pain    The patient understands our medical plan. Alternatives have been explained and offered. The risks, benefits and significant side effects of all medications have been reviewed. All questions have been addressed. She is encouraged to employ the information provided in the after visit summary, which was reviewed. She is instructed to call the clinic if she has not been notified either by phone or through [address] with the results of her tests or with an appointment plan for any referrals within 1 week(s). No news is not good news; it's no news. The patient  is to call if her condition worsens or fails to improve or if significant side effects are experienced. Follow-up Disposition:  Return for shoulder pain plan with Dr. Odette Aguilar. Dragon medical dictation software was used for portions of this report. Unintended voice recognition errors may occur.

## 2017-07-21 NOTE — PROGRESS NOTES
Patient states she was trying to clear out her spare bedroom on Saturday when she \"wrenched\" her right shoulder and has been having significant pain every since she is taking one Tylenol PM at night to try to get some sleep and has decreased ROM. She is scheduled for surgery for 8/29/17 with Dr. Rosie Ball.  Has appt with GI on 8/22/17. 1. Have you been to the ER, urgent care clinic since your last visit? Hospitalized since your last visit? No    2. Have you seen or consulted any other health care providers outside of the [address] [place] [address] since your last visit? Include any pap smears or colon screening.  Yes When: June 14, 2017 Where: Dr. Rosie Ball Reason for visit: shoulder pain f/u

## 2017-07-21 NOTE — MR AVS SNAPSHOT
Visit Information Date & Time Provider Department Dept. Phone Encounter #  
 7/21/2017 11:30 AM Rebel Hernández MD [facility] [phone] [id number] Follow-up Instructions Return for shoulder pain plan with Dr. Severa Hock. Upcoming Health Maintenance Date Due FOBT Q 1 YEAR AGE 50-75 12/4/2008 BREAST CANCER SCRN MAMMOGRAM 10/16/2015 INFLUENZA AGE 9 TO ADULT 8/1/2017 PAP AKA CERVICAL CYTOLOGY 10/1/2020 DTaP/Tdap/Td series (2 - Td) 3/6/2024 Allergies as of 7/21/2017  Review Complete On: 7/21/2017 By: Rebel Hernández MD  
  
 Severity Noted Reaction Type Reactions Morphine (Pf)  06/11/2013    Unknown (comments) Increases Pain- MAKES PAIN WORST Pcn [Penicillins]  06/11/2013    Unknown (comments) Unknown reaction. Current Immunizations  Reviewed on 10/1/2015 Name Date Influenza Vaccine 9/15/2012 Influenza Vaccine (Quad) PF 1/31/2017, 10/1/2015 Tdap 3/6/2014 Not reviewed this visit You Were Diagnosed With   
  
 Codes Comments Acute pain of right shoulder     ICD-10-CM: M25.511 ICD-9-CM: 719.41 Tear of right rotator cuff, unspecified tear extent     ICD-10-CM: M75.101 ICD-9-CM: 840.4 Vitals BP Pulse Temp Resp Weight(growth percentile) SpO2  
 124/88 (BP 1 Location: Left arm, BP Patient Position: Sitting) 64 98.1 °F (36.7 °C) (Oral) 14 241 lb 9.6 oz (109.6 kg) 98% BMI OB Status Smoking Status 44 kg/m2 Menopause Former Smoker Vitals History BMI and BSA Data Body Mass Index Body Surface Area  
 39 kg/m 2 2.26 m 2 Preferred Pharmacy Pharmacy Name Phone [facility], [address] [place] [address] Rd [phone] Your Updated Medication List  
  
   
This list is accurate as of: 7/21/17 12:00 PM.  Always use your most recent med list.  
  
  
  
  
 aspirin delayed-release 81 mg tablet Take 1 Tab by mouth daily. cyanocobalamin 500 mcg tablet Commonly known as:  VITAMIN B12 Take 500 mcg by mouth daily. GENTLE IRON PO Take 27 mg by mouth daily (with breakfast). loratadine 10 mg tablet Commonly known as:  CLARITIN  
TAKE 1 TABLET DAILY NexIUM 40 mg capsule Generic drug:  esomeprazole TAKE 1 CAPSULE DAILY  
  
 ONE DAILY WOMENS 50 PLUS PO Take  by mouth. OTHER Tumeric PO  
  
 oxyCODONE-acetaminophen 2.5-325 mg per tablet Commonly known as:  PERCOCET Take 1-2 Tabs by mouth every four (4) hours as needed for Pain. Max Daily Amount: 12 Tabs. rosuvastatin 5 mg tablet Commonly known as:  CRESTOR  
  
 VITAMIN C 500 mg tablet Generic drug:  ascorbic acid (vitamin C) Take  by mouth. Prescriptions Printed Refills  
 oxyCODONE-acetaminophen (PERCOCET) 2.5-325 mg per tablet 0 Sig: Take 1-2 Tabs by mouth every four (4) hours as needed for Pain. Max Daily Amount: 12 Tabs. Class: Print Route: Oral  
  
Follow-up Instructions Return for shoulder pain plan with Dr. Sourav Jeffers. Introducing [facility] & HEALTH SERVICES! Dear Tesha Chong: Thank you for requesting a [employer] account. Our records indicate that you already have an active [employer] account. You can access your account anytime at [email or web address] Did you know that you can access your hospital and ER discharge instructions at any time in [employer]? You can also review all of your test results from your hospital stay or ER visit. Additional Information If you have questions, please visit the Frequently Asked Questions section of the [employer] website at [email or web address] Remember, [employer] is NOT to be used for urgent needs. For medical emergencies, dial 911. Now available from your iPhone and Android! Please provide this summary of care documentation to your next provider. Your primary care clinician is listed as Wilberto García. If you have any questions after today's visit, please call [phone].

## 2017-07-27 DIAGNOSIS — M25.511 ACUTE PAIN OF RIGHT SHOULDER: ICD-10-CM

## 2017-07-27 DIAGNOSIS — M75.101 TEAR OF RIGHT ROTATOR CUFF, UNSPECIFIED TEAR EXTENT: ICD-10-CM

## 2017-07-27 RX ORDER — OXYCODONE AND ACETAMINOPHEN 2.5; 325 MG/1; MG/1
1-2 TABLET ORAL
Qty: 60 TAB | Refills: 0 | Status: CANCELLED | OUTPATIENT
Start: 2017-07-27

## 2017-07-27 NOTE — TELEPHONE ENCOUNTER
Called Dr. Wiliam Peña office again left message for his nurse Criss Moreau asking her to please call me back to discuss mutual patient, office number has been left, also tried to contact the medical records department again no answer left message asking to for a return call or that the [address] notes be faxed over office telephone and fax number both left.

## 2017-07-27 NOTE — TELEPHONE ENCOUNTER
This patient contacted office for the following prescriptions to be filled:    Medication requested :   Requested Prescriptions     Pending Prescriptions Disp Refills    oxyCODONE-acetaminophen (PERCOCET) 2.5-325 mg per tablet 60 Tab 0     Sig: Take 1-2 Tabs by mouth every four (4) hours as needed for Pain. Max Daily Amount: 12 Tabs.    (Patient is asking for 1 more refill of this medication before her surgery, she said she has tried to stretch it out but will take last one Saturday)  She can be reached at [phone]  PCP: Dr. Darryle Miners or Print: Print  Mail order or Local pharmacy: Print    Scheduled appointment if not seen by current providers in office: LOV 7/21/17

## 2017-07-27 NOTE — LETTER
8/11/2017 3:49 PM 
 
Ms. [address] Dear Dr. Talib Martinez, This letter is in response to your request to coordinate care for our mutual patient, Mrs. Yesica Brooks. As you know, she is scheduled for surgery on August 29, 2017. I prescribed oxycodone acetaminophen 2.5325 mg for the acute exacerbation of her chronic right shoulder pain on July 21, 2017, bridging the interval until her surgery. I defer to your expertise to address her post operative pain.  
 
Sincerely, 
 
 
Eugene Yang MD

## 2017-07-27 NOTE — TELEPHONE ENCOUNTER
Patient is asking for refills on her pain medication  has been ran and put in providers office for review. Please advise.

## 2017-07-28 ENCOUNTER — OFFICE VISIT (OUTPATIENT)
Dept: FAMILY MEDICINE CLINIC | Age: 57
End: 2017-07-28

## 2017-07-28 VITALS
OXYGEN SATURATION: 98 % | SYSTOLIC BLOOD PRESSURE: 112 MMHG | RESPIRATION RATE: 12 BRPM | TEMPERATURE: 98.2 F | WEIGHT: 241 LBS | BODY MASS INDEX: 38.9 KG/M2 | DIASTOLIC BLOOD PRESSURE: 80 MMHG | HEART RATE: 81 BPM

## 2017-07-28 DIAGNOSIS — M75.101 TEAR OF RIGHT ROTATOR CUFF, UNSPECIFIED TEAR EXTENT: Primary | ICD-10-CM

## 2017-07-28 RX ORDER — OXYCODONE AND ACETAMINOPHEN 2.5; 325 MG/1; MG/1
1-2 TABLET ORAL
Qty: 60 TAB | Refills: 0 | Status: SHIPPED | OUTPATIENT
Start: 2017-07-28 | End: 2017-10-24 | Stop reason: ALTCHOICE

## 2017-07-28 NOTE — TELEPHONE ENCOUNTER
Called Abisai Urbano back no answer left another message asking her to call me at the office to discuss letter that was faxed to the office on patient. Office number has been left.

## 2017-07-28 NOTE — TELEPHONE ENCOUNTER
Kamini Whatley called the office back stating patient has been seen by Dr. Miriam Grier office however Dr. Chung Bill only provides pain management for post op and will not prescribe pain medication for his pre op patients. Kamini Whatley will be faxing a letter to the office on this.

## 2017-07-28 NOTE — MR AVS SNAPSHOT
Visit Information Date & Time Provider Department Dept. Phone Encounter #  
 7/28/2017  1:15 PM Edyta Harrington MD [facility] 14 [phone] [id number] Follow-up Instructions Return if symptoms worsen or fail to improve. Upcoming Health Maintenance Date Due FOBT Q 1 YEAR AGE 50-75 12/4/2008 BREAST CANCER SCRN MAMMOGRAM 10/16/2015 INFLUENZA AGE 9 TO ADULT 8/1/2017 PAP AKA CERVICAL CYTOLOGY 10/1/2020 DTaP/Tdap/Td series (2 - Td) 3/6/2024 Allergies as of 7/28/2017  Review Complete On: 7/28/2017 By: Edyta Harrington MD  
  
 Severity Noted Reaction Type Reactions Morphine (Pf)  06/11/2013    Unknown (comments) Increases Pain- MAKES PAIN WORST Pcn [Penicillins]  06/11/2013    Unknown (comments) Unknown reaction. Current Immunizations  Reviewed on 10/1/2015 Name Date Influenza Vaccine 9/15/2012 Influenza Vaccine (Quad) PF 1/31/2017, 10/1/2015 Tdap 3/6/2014 Not reviewed this visit You Were Diagnosed With   
  
 Codes Comments Tear of right rotator cuff, unspecified tear extent    -  Primary ICD-10-CM: M75.101 ICD-9-CM: 840.4 Vitals BP Pulse Temp Resp Weight(growth percentile) SpO2  
 112/80 (BP 1 Location: Left arm, BP Patient Position: Sitting) 81 98.2 °F (36.8 °C) (Oral) 12 241 lb (109.3 kg) 98% BMI OB Status Smoking Status 38.9 kg/m2 Menopause Former Smoker Vitals History BMI and BSA Data Body Mass Index Body Surface Area 38.9 kg/m 2 2.26 m 2 Preferred Pharmacy Pharmacy Name Phone [facility] [address] [place] [address] [phone] Your Updated Medication List  
  
   
This list is accurate as of: 7/28/17  1:48 PM.  Always use your most recent med list.  
  
  
  
  
 aspirin delayed-release 81 mg tablet Take 1 Tab by mouth daily. cyanocobalamin 500 mcg tablet Commonly known as:  VITAMIN B12  
 Take 500 mcg by mouth daily. GENTLE IRON PO Take 27 mg by mouth daily (with breakfast). loratadine 10 mg tablet Commonly known as:  CLARITIN  
TAKE 1 TABLET DAILY NexIUM 40 mg capsule Generic drug:  esomeprazole TAKE 1 CAPSULE DAILY  
  
 ONE DAILY WOMENS 50 PLUS PO Take  by mouth. OTHER Tumeric PO  
  
 oxyCODONE-acetaminophen 2.5-325 mg per tablet Commonly known as:  PERCOCET Take 1-2 Tabs by mouth every four (4) hours as needed for Pain. Max Daily Amount: 12 Tabs. rosuvastatin 5 mg tablet Commonly known as:  CRESTOR  
  
 VITAMIN C 500 mg tablet Generic drug:  ascorbic acid (vitamin C) Take  by mouth. Prescriptions Printed Refills  
 oxyCODONE-acetaminophen (PERCOCET) 2.5-325 mg per tablet 0 Sig: Take 1-2 Tabs by mouth every four (4) hours as needed for Pain. Max Daily Amount: 12 Tabs. Class: Print Route: Oral  
  
Follow-up Instructions Return if symptoms worsen or fail to improve. Introducing [facility] & HEALTH SERVICES! Dear Ruthy Floyd: Thank you for requesting a [employer] account. Our records indicate that you already have an active [employer] account. You can access your account anytime at [email or web address] Did you know that you can access your hospital and ER discharge instructions at any time in [employer]? You can also review all of your test results from your hospital stay or ER visit. Additional Information If you have questions, please visit the Frequently Asked Questions section of the [employer] website at [email or web address] Remember, [employer] is NOT to be used for urgent needs. For medical emergencies, dial 911. Now available from your iPhone and Android! Please provide this summary of care documentation to your next provider. Your primary care clinician is listed as Bob Farnsworther. If you have any questions after today's visit, please call [phone].

## 2017-07-28 NOTE — PROGRESS NOTES
Sheryle Bunch is a 60 y.o. female  Scheduled for surgery to repair rotator cuff tear right shoulder on 8/29/17 by Dr. Renea Devi. She reports, and his office confirms, that he will not manage pain pre-operatively. I'd prescribed low dose short acting narcotics #60 tabs on 7/21/17. She reports relief of severe pain, with enough residual discomfort to curtail her activities. \"I don't even make a pot of coffee. \" Requiring 4-8 tabs/day, with enough remaining for tomorrow. She notes feeling somewhat sluggish when she uses it, enough to discourage the use of knives. No GI side effects. She indicates Dr. Renea Devi desires for her to have no narcotic medication in her system for at least the last week prior to surgery. Her goal is to stop it completely as of 8/15/2017.  query shows no suspicious activity. We received from his office what appears to be a consultation request for post operative pain management. I have not previously seen this type of request.      Personal or family history of psychiatric, addiction, or substance abuse: no    Aberrant behaviors: None. Concomitant use of a benzodiazepine: no    Patient Care Team:  Telly Peña MD as PCP - General (Family Practice)  Soni Bonner MD (Dermatology)  Fartun Stein MD (Orthopedic Surgery)  Allergies   Allergen Reactions    Morphine (Pf) Unknown (comments)     Increases Pain- MAKES PAIN WORST    Pcn [Penicillins] Unknown (comments)     Unknown reaction. Outpatient Prescriptions Marked as Taking for the 7/28/17 encounter (Office Visit) with Telly Peña MD   Medication Sig Dispense Refill    OTHER Tumeric PO      oxyCODONE-acetaminophen (PERCOCET) 2.5-325 mg per tablet Take 1-2 Tabs by mouth every four (4) hours as needed for Pain. Max Daily Amount: 12 Tabs.  60 Tab 0    NEXIUM 40 mg capsule TAKE 1 CAPSULE DAILY 90 Cap 0    rosuvastatin (CRESTOR) 5 mg tablet       aspirin delayed-release 81 mg tablet Take 1 Tab by mouth daily. 90 Tab 0    loratadine (CLARITIN) 10 mg tablet TAKE 1 TABLET DAILY 90 Tab 4    IRON BIS-GLYCINAT/VIT C/FA/B12 (GENTLE IRON PO) Take 27 mg by mouth daily (with breakfast).  cyanocobalamin (VITAMIN B12) 500 mcg tablet Take 500 mcg by mouth daily.  ascorbic acid (VITAMIN C) 500 mg tablet Take  by mouth.  FOLIC ACID/MULTIVITS-MIN (ONE DAILY WOMENS 50 PLUS PO) Take  by mouth. Patient Active Problem List    Diagnosis    Hx of peptic ulcer     Notes dated 10/8/10: marginal ulcer NSAID induced. Plan for PPI x 3 months only. Dr. Jeancarlos Shrestha.  Advanced directives, counseling/discussion    Thalassemia trait    Iron deficiency anemia     GI bleed, history of some sort of ulcer, unclear if anastomotic ulcer or long term PPI plan, gastric bypass: iron infusion required 2010, EGD 2010.  Prediabetes    Hx of gastric bypass    Obesity (BMI 30-39. 9)    Gastroesophageal reflux disease without esophagitis    Allergic conjunctivitis and rhinitis     Past Medical History:   Diagnosis Date    Chemical pneumonia ([address] unknown    Gall bladder disease unknown    Gastric bypass status for obesity unknown    Hernia unknown    Knee joint replacement status unknown    Muscle ache unknown    Pendulous abdomen unknown    Rosacea     Thalassemia trait     Ulcer ([address] unknown     Past Surgical History:   Procedure Laterality Date    HX CHOLECYSTECTOMY      HX GASTRIC BYPASS      HX GYN      HX ORTHOPAEDIC      BILATERAL KNEE REPLACEMENT     Family History   Problem Relation Age of Onset    Cancer Mother     Heart Disease Father     Breast Cancer Neg Hx     Colon Cancer Neg Hx      Social History     Social History    Marital status:      Spouse name: N/A    Number of children: N/A    Years of education: N/A     Occupational History    Not on file.      Social History Main Topics    Smoking status: Former Smoker    Smokeless tobacco: Former User     Quit date: 1/1/1986  Alcohol use 3.5 oz/week     7 Glasses of wine per week    Drug use: No    Sexual activity: No      Comment: LMP 2011     Other Topics Concern    Not on file     Social History Narrative         Review of Systems   Gastrointestinal: Negative for abdominal pain, nausea and vomiting. Visit Vitals    /80 (BP 1 Location: Left arm, BP Patient Position: Sitting)    Pulse 81    Temp 98.2 °F (36.8 °C) (Oral)    Resp 12    Wt 241 lb (109.3 kg)    SpO2 98%    BMI 38.9 kg/m2     Physical Exam   Constitutional: She is oriented to person, place, and time. She appears well-developed. No distress. Pleasant obese white female   HENT:   Head: Normocephalic and atraumatic. Pulmonary/Chest: Effort normal.   Neurological: She is alert and oriented to person, place, and time. Psychiatric: She has a normal mood and affect. Her behavior is normal. Judgment and thought content normal.         ICD-10-CM ICD-9-CM    1. Tear of right rotator cuff, unspecified tear extent M75.101 840.4 oxyCODONE-acetaminophen (PERCOCET) 2.5-325 mg per tablet       Acute on chronic pain. Satisfactory control of severe pain with low dose short acting narcotics. This reluctance to assume full management of an orthopedic issue is unique in my experience with my orthopedic colleagues and gives me pause. Nonetheless, continued management with short acting low dose narcotics for the next two weeks is indicated. I've asked by staff to clarify the nature of the request for consultation for post operative pain management. Jason Montero will be standing by for clarification as well. The patient understands our medical plan. Alternatives have been explained and offered. The risks, benefits and significant side effects of all medications have been reviewed. All questions have been addressed. Follow-up Disposition:  Return if symptoms worsen or fail to improve.      Over 50% of the 17 minutes face to face with Nadeem Gregory consisted of counseling and/or discussing treatment plans.

## 2017-07-28 NOTE — PROGRESS NOTES
Patient here for pain management of her right shoulder pain she is scheduled for surgery with Dr. Madison Booth who is not willing to manage her post op pain. 1. Have you been to the ER, urgent care clinic since your last visit? Hospitalized since your last visit? No    2. Have you seen or consulted any other health care providers outside of the [address] [place] [address] since your last visit? Include any pap smears or colon screening.  Yes When: 7/26/2017 Where: Dr. Madison Booth Reason for visit: right shoulder pain

## 2017-07-28 NOTE — TELEPHONE ENCOUNTER
Letter received from [facility] left her a message asking her to call me to discuss.

## 2017-08-11 NOTE — TELEPHONE ENCOUNTER
Spoke with Claudio Anglin from Dr. Iam Rushing office who stated Dr. Gómez Haider was not asking Dr. Yin Lopez to prescribe the Dilaudid for patient he was only asking if it would be okay for him to prescribe the Dilaudid since she is currently getting Percocet from Dr. Yin Lopez. Explained to Claudio Anglin that Dr. Yin Lopez is not planning to prescribe the Percocet for patient once she has had her surgery this is just to control her pain until she has her procedure. Claudio Anglin has expressed understanding and stated she will let Dr. Gómez Haider know. Letter that was faxed from Dr. Gómez Haider has been placed in Dr. Zulma Lazo folder to be signed for scanning.

## 2017-08-21 ENCOUNTER — HOSPITAL ENCOUNTER (OUTPATIENT)
Dept: PREADMISSION TESTING | Age: 57
Discharge: HOME | End: 2017-08-21
Attending: ORTHOPAEDIC SURGERY
Payer: OTHER GOVERNMENT

## 2017-08-21 DIAGNOSIS — Z01.818 PREOP EXAMINATION: ICD-10-CM

## 2017-08-21 DIAGNOSIS — M75.121 COMPLETE TEAR OF RIGHT ROTATOR CUFF: ICD-10-CM

## 2017-08-21 DIAGNOSIS — M75.21 BICIPITAL TENDINITIS OF RIGHT SHOULDER: ICD-10-CM

## 2017-08-21 DIAGNOSIS — M75.41 IMPINGEMENT SYNDROME OF RIGHT SHOULDER: ICD-10-CM

## 2017-08-21 PROCEDURE — 93005 ELECTROCARDIOGRAM TRACING: CPT

## 2017-08-22 LAB
ATRIAL RATE: 59 BPM
CALCULATED P AXIS, ECG09: 69 DEGREES
CALCULATED R AXIS, ECG10: 64 DEGREES
CALCULATED T AXIS, ECG11: 74 DEGREES
DIAGNOSIS, 93000: NORMAL
P-R INTERVAL, ECG05: 170 MS
Q-T INTERVAL, ECG07: 418 MS
QRS DURATION, ECG06: 86 MS
QTC CALCULATION (BEZET), ECG08: 413 MS
VENTRICULAR RATE, ECG03: 59 BPM

## 2017-08-25 ENCOUNTER — TELEPHONE (OUTPATIENT)
Dept: FAMILY MEDICINE CLINIC | Age: 57
End: 2017-08-25

## 2017-08-25 NOTE — TELEPHONE ENCOUNTER
Received a fax from Dr. Miriam Grier office asking for pre op form to be completed patient's surgery is scheduled for Tuesday 8/29/17. Patient has not been seen in the office for pre op clearance and Dr. Martínez Ordonez is not in the office on Monday, called the patient to see if she can get in today to have her exam done no answer left message letting her know what was going on and asked that she please call the office back as soon as possible.

## 2017-08-28 NOTE — TELEPHONE ENCOUNTER
Received a call from [facility] at Dr. Tara Sharif office who asked about patient's pre op clearance note she was told patient had not had a clearance done with Dr. Kristen Sylvester, we attempted to contact patient on Friday to have her come in for a clearance but was not able to get in touch with her a message was left for patient letting her know she needed to be seen for pre op clearance.

## 2017-09-10 DIAGNOSIS — J30.9 ALLERGIC CONJUNCTIVITIS AND RHINITIS, BILATERAL: ICD-10-CM

## 2017-09-10 DIAGNOSIS — H10.13 ALLERGIC CONJUNCTIVITIS AND RHINITIS, BILATERAL: ICD-10-CM

## 2017-09-11 RX ORDER — LORATADINE 10 MG/1
TABLET ORAL
Qty: 90 TAB | Refills: 0 | Status: SHIPPED | OUTPATIENT
Start: 2017-09-11 | End: 2019-02-04 | Stop reason: SDUPTHER

## 2017-09-11 NOTE — TELEPHONE ENCOUNTER
Automated refill request.  She is recently postop from shoulder surgery. Order for 3 month supply generated and routed to her mail order pharmacy. Most recent OV for allergies was in 2015.   OV will be required if further prescription management preferred over purchasing OTC. REX

## 2017-09-11 NOTE — TELEPHONE ENCOUNTER
Informed patient she stated she does not take it as often but uses it every once in a while. She will make a follow up if she needs a refill.

## 2017-09-19 NOTE — TELEPHONE ENCOUNTER
Patient called the office stating she is running low on her Nexium 40 mg she is aware that [facility] no longer covers this medication she is willing to try Protonix. After reviewing patient's chart saw she had been referred to GI called patient to let her know she will need to call that office for her medication management/refill she states she is not going to be seeing them on a steady basis she has seen them once and has an endoscopy scheduled in 9/26/2017 and only has enough medication to last her until 10/4/2017. Protonix has been pended? Please advise.

## 2017-09-21 RX ORDER — PANTOPRAZOLE SODIUM 40 MG/1
40 TABLET, DELAYED RELEASE ORAL 2 TIMES DAILY
Qty: 180 TAB | Refills: 0 | Status: SHIPPED | OUTPATIENT
Start: 2017-09-21 | End: 2017-10-31 | Stop reason: SDUPTHER

## 2017-09-21 NOTE — TELEPHONE ENCOUNTER
Okay. Generating a 3 an order for a 3 month supply, enough time for GI to complete their workup, render recommendations. Plan to see me to review the details and assess response to the new PPI before 3 month supply is exhausted.    REX

## 2017-09-22 NOTE — TELEPHONE ENCOUNTER
Called patient had her verify her  she has been made aware 3 month supply of Protonix sent to [facility], she will need to set up an appointment with Dr. Katlyn Issa once her GI work up is complete and definitely before her 3 month supply runs out she has expressed understanding and states she is going out of town next week and will call the office when she returns to set up her appointment.

## 2017-10-24 ENCOUNTER — OFFICE VISIT (OUTPATIENT)
Dept: FAMILY MEDICINE CLINIC | Age: 57
End: 2017-10-24

## 2017-10-24 ENCOUNTER — HOSPITAL ENCOUNTER (OUTPATIENT)
Dept: LAB | Age: 57
Discharge: HOME | End: 2017-10-24
Payer: OTHER GOVERNMENT

## 2017-10-24 VITALS
OXYGEN SATURATION: 98 % | BODY MASS INDEX: 38.44 KG/M2 | HEIGHT: 66 IN | RESPIRATION RATE: 12 BRPM | HEART RATE: 65 BPM | DIASTOLIC BLOOD PRESSURE: 60 MMHG | SYSTOLIC BLOOD PRESSURE: 100 MMHG | WEIGHT: 239.2 LBS | TEMPERATURE: 98.2 F

## 2017-10-24 DIAGNOSIS — E66.9 OBESITY (BMI 30-39.9): ICD-10-CM

## 2017-10-24 DIAGNOSIS — Z01.419 WELL WOMAN EXAM: Primary | ICD-10-CM

## 2017-10-24 DIAGNOSIS — Z98.84 HX OF GASTRIC BYPASS: ICD-10-CM

## 2017-10-24 DIAGNOSIS — I25.10 CORONARY ARTERY DISEASE INVOLVING NATIVE HEART WITHOUT ANGINA PECTORIS, UNSPECIFIED VESSEL OR LESION TYPE: ICD-10-CM

## 2017-10-24 DIAGNOSIS — Z91.89 AT RISK FOR OSTEOPOROSIS: ICD-10-CM

## 2017-10-24 DIAGNOSIS — R73.03 PREDIABETES: ICD-10-CM

## 2017-10-24 DIAGNOSIS — Z23 ENCOUNTER FOR IMMUNIZATION: ICD-10-CM

## 2017-10-24 DIAGNOSIS — Z12.11 SCREEN FOR COLON CANCER: ICD-10-CM

## 2017-10-24 DIAGNOSIS — Z12.39 SCREENING FOR BREAST CANCER: ICD-10-CM

## 2017-10-24 LAB
CHOLEST SERPL-MCNC: 114 MG/DL
EST. AVERAGE GLUCOSE BLD GHB EST-MCNC: 103 MG/DL
HBA1C MFR BLD: 5.2 % (ref 4.2–5.6)
HDLC SERPL-MCNC: 51 MG/DL (ref 40–60)
HDLC SERPL: 2.2 {RATIO} (ref 0–5)
LDLC SERPL CALC-MCNC: 45 MG/DL (ref 0–100)
LIPID PROFILE,FLP: NORMAL
TRIGL SERPL-MCNC: 90 MG/DL (ref ?–150)
VLDLC SERPL CALC-MCNC: 18 MG/DL

## 2017-10-24 PROCEDURE — 82306 VITAMIN D 25 HYDROXY: CPT | Performed by: FAMILY MEDICINE

## 2017-10-24 PROCEDURE — 80061 LIPID PANEL: CPT | Performed by: FAMILY MEDICINE

## 2017-10-24 PROCEDURE — 83036 HEMOGLOBIN GLYCOSYLATED A1C: CPT | Performed by: FAMILY MEDICINE

## 2017-10-24 RX ORDER — ASPIRIN 81 MG/1
81 TABLET ORAL DAILY
Qty: 90 TAB | Refills: 0 | Status: SHIPPED | OUTPATIENT
Start: 2017-10-24 | End: 2017-11-28 | Stop reason: SDUPTHER

## 2017-10-24 RX ORDER — ROSUVASTATIN CALCIUM 5 MG/1
5 TABLET, COATED ORAL DAILY
Qty: 90 TAB | Refills: 5 | Status: SHIPPED | OUTPATIENT
Start: 2017-10-24 | End: 2019-05-08 | Stop reason: SDUPTHER

## 2017-10-24 RX ORDER — HYDROMORPHONE HYDROCHLORIDE 2 MG/1
TABLET ORAL
COMMUNITY
Start: 2017-10-23 | End: 2018-07-02 | Stop reason: ALTCHOICE

## 2017-10-24 NOTE — PROGRESS NOTES
Subjective:   60 y.o. female for Well Woman Check. No LMP recorded. Patient is not currently having periods (Reason: Menopause). history of bariatric surgery with recommendation for periodic bone density testing. Hasn't happened. Bariatric surgery 13 years ago. Social History: single partner, contraception - post menopausal status. Reports healthy diet and regular exercise    Last year, chest pain led to cath that showed \"plaque build up not big enough to put a stent in\". Started on ASA and statin. The reports were not available for review at the time of our visit. Lab Results   Component Value Date/Time    Cholesterol, total 146 06/11/2015 11:12 AM    HDL Cholesterol 49 06/11/2015 11:12 AM    LDL, calculated 81 06/11/2015 11:12 AM    VLDL, calculated 16 06/11/2015 11:12 AM    Triglyceride 78 06/11/2015 11:12 AM    CHOL/HDL Ratio 3.7 09/14/2010 11:42 AM     complains of uncontrolled GERD despite PPI. EGD planned in near future    Patient Care Team:  Hermann Oliva MD as PCP - General (Family Practice)  Esa Leon MD (Dermatology)  Felipe Ponce MD (Orthopedic Surgery)  [place]  Allergies   Allergen Reactions    Morphine (Pf) Unknown (comments)     Increases Pain- MAKES PAIN WORST    Pcn [Penicillins] Unknown (comments)     Unknown reaction. Outpatient Prescriptions Marked as Taking for the 10/24/17 encounter (Office Visit) with Hermann Oliva MD   Medication Sig Dispense Refill    HYDROmorphone (DILAUDID) 2 mg tablet       pantoprazole (PROTONIX) 40 mg tablet Take 1 Tab by mouth two (2) times a day. 180 Tab 0    OTHER Tumeric PO      rosuvastatin (CRESTOR) 5 mg tablet       aspirin delayed-release 81 mg tablet Take 1 Tab by mouth daily. 90 Tab 0    IRON BIS-GLYCINAT/VIT C/FA/B12 (GENTLE IRON PO) Take 27 mg by mouth daily (with breakfast).  cyanocobalamin (VITAMIN B12) 500 mcg tablet Take 500 mcg by mouth daily.       ascorbic acid (VITAMIN C) 500 mg tablet Take  by mouth.      FOLIC ACID/MULTIVITS-MIN (ONE DAILY WOMENS 50 PLUS PO) Take  by mouth. Patient Active Problem List    Diagnosis    Hx of peptic ulcer     Notes dated 10/8/10: marginal ulcer NSAID induced. Plan for PPI x 3 months only. Dr. Kaley Barth.  Advanced directives, counseling/discussion    Thalassemia trait    Iron deficiency anemia     GI bleed, history of some sort of ulcer, unclear if anastomotic ulcer or long term PPI plan, gastric bypass: iron infusion required 2010, EGD 2010.  Prediabetes    Hx of gastric bypass    Obesity (BMI 30-39. 9)    Gastroesophageal reflux disease without esophagitis    Allergic conjunctivitis and rhinitis     Past Medical History:   Diagnosis Date    Chemical pneumonia ([facility] [address] unknown    Gall bladder disease unknown    Gastric bypass status for obesity unknown    Hernia unknown    Knee joint replacement status unknown    Muscle ache unknown    Pendulous abdomen unknown    Rosacea     Thalassemia trait     Ulcer ([facility] [address] unknown     Past Surgical History:   Procedure Laterality Date    HX CHOLECYSTECTOMY      HX GASTRIC BYPASS      HX GYN      HX ORTHOPAEDIC      BILATERAL KNEE REPLACEMENT    HX ORTHOPAEDIC Right 08/29/2017    Kd Hess MD: subacromial decompression , rotator cuff repair, biceps tenotomy     Family History   Problem Relation Age of Onset    Cancer Mother     Heart Disease Father     Breast Cancer Neg Hx     Colon Cancer Neg Hx      Social History     Social History    Marital status:      Spouse name: N/A    Number of children: N/A    Years of education: N/A     Occupational History    retired air traffic [employer]     Social History Main Topics    Smoking status: Former Smoker    Smokeless tobacco: Former User     Quit date: 1/1/1986    Alcohol use 3.5 oz/week     7 Glasses of wine per week    Drug use: No    Sexual activity: No      Comment: LMP 2011     Other Topics Concern    Not on file Social History Narrative              ROS:  Feeling well. No dyspnea or chest pain on exertion. No abdominal pain, change in bowel habits, black or bloody stools. GYN ROS: no breast pain or new or enlarging lumps on self exam, no vaginal bleeding, no discharge or pelvic pain. No neurological complaints. Objective:     Visit Vitals    /60 (BP 1 Location: Left arm, BP Patient Position: Sitting)    Pulse 65    Temp 98.2 °F (36.8 °C) (Oral)    Resp 12    Ht 5' 6\" (1.676 m)    Wt 239 lb 3.2 oz (108.5 kg)    SpO2 98%    BMI 38.61 kg/m2     The patient appears well, alert, oriented x 3, in no distress. ENT normal.  Neck supple. No adenopathy or thyromegaly. Lungs are clear, good air entry, no wheezes, rhonchi or rales. S1 and S2 normal, no murmurs, regular rate and rhythm. Abdomen soft without tenderness, guarding, mass or organomegaly. Extremities show no edema, normal peripheral pulses. Neurological is normal, no focal findings. BREAST EXAM: breasts appear normal, no suspicious masses, no skin or nipple changes or axillary nodes    PELVIC EXAM: examination not indicated    Assessment/Plan:       ICD-10-CM ICD-9-CM    1. Well woman exam Z01.419 V72.31    2. Screening for breast cancer Z12.31 V76.10 [facility] MAMMO BI SCREENING INCL CAD   3. Encounter for immunization Z23 V03.89 INFLUENZA VIRUS VACCINE, PRESERVATIVE FREE SYRINGE, 3 YRS AND OLDER      DEXA BONE DENSITY STUDY AXIAL   4. At risk for osteoporosis Z91.89 V49.89 DEXA BONE DENSITY STUDY AXIAL      VITAMIN D, 25 HYDROXY   5. Hx of gastric bypass Z98.890 V45.86 DEXA BONE DENSITY STUDY AXIAL      VITAMIN D, 25 HYDROXY   6. Screen for colon cancer Z12.11 V76.51 OCCULT BLOOD, IMMUNOASSAY (FIT)   7. Coronary artery disease involving native heart without angina pectoris, unspecified vessel or lesion type I25.10 414.01 aspirin delayed-release 81 mg tablet      rosuvastatin (CRESTOR) 5 mg tablet      LIPID PANEL W/ REFLX DIRECT LDL   8.  Prediabetes R73.03 790.29 HEMOGLOBIN A1C WITH EAG   9. Obesity (BMI 30-39. 9) E66.9 278.00      Notify us of vit d replacement dose. Obesity is rf for breast cancer as well as other conditions. Weight loss recommended    Requesting cath report    The patient understands our medical plan. Alternatives have been explained and offered. Anticipated time course and progression of condition reviewed. All questions have been addressed. She is encouraged to employ the information provided in the after visit summary, which was reviewed. She is instructed to call the clinic if she has not been notified either by phone or through [address] with the results of her tests or with an appointment plan for any referrals within 1 week(s). No news is not good news; it's no news. The patient  is to call if her condition worsens or fails to improve or if significant side effects are experienced. Follow-up Disposition:  Return in about 1 week (around 10/31/2017) for GERD follow up, 1 year for well woman. Dragon medical dictation software was used for portions of this report. Unintended voice recognition errors may occur.

## 2017-10-24 NOTE — PROGRESS NOTES
Patient here for her annual exam. She is asking for refills on her aspirin. She had to r/s her endoscopy to 11/15/17 due to surgery on her shoulder. Patient is asking for a Dexa scan to be done at a [facility]. 1. Have you been to the ER, urgent care clinic since your last visit? Hospitalized since your last visit? Yes When: 8/29/2017 Where: [facility] Reason for visit: shoulder surgery    2. Have you seen or consulted any other health care providers outside of the [address] [place] [address] since your last visit? Include any pap smears or colon screening. Yes When: 10/4/17 Where: Dr. Karen Marin Reason for visit: routine f/u  Health Maintenance reviewed - Mammogram ordered, will give pt another Fit test to take home, Flu vaccine ordered.

## 2017-10-24 NOTE — MR AVS SNAPSHOT
Visit Information Date & Time Provider Department Dept. Phone Encounter #  
 10/24/2017  9:00 AM Ha Meade MD [facility] [phone] [id number] Follow-up Instructions Return in about 1 week (around 10/31/2017) for GERD follow up, 1 year for well woman. Upcoming Health Maintenance Date Due FOBT Q 1 YEAR AGE 50-75 12/4/2008 BREAST CANCER SCRN MAMMOGRAM 10/16/2015 INFLUENZA AGE 9 TO ADULT 8/1/2017 PAP AKA CERVICAL CYTOLOGY 10/1/2020 DTaP/Tdap/Td series (2 - Td) 3/6/2024 Allergies as of 10/24/2017  Review Complete On: 10/24/2017 By: Ha Meade MD  
  
 Severity Noted Reaction Type Reactions Morphine (Pf)  06/11/2013    Unknown (comments) Increases Pain- MAKES PAIN WORST Pcn [Penicillins]  06/11/2013    Unknown (comments) Unknown reaction. Current Immunizations  Reviewed on 10/1/2015 Name Date Influenza Vaccine 9/15/2012 Influenza Vaccine (Quad) PF 1/31/2017, 10/1/2015 Influenza Vaccine PF  Incomplete Tdap 3/6/2014 Not reviewed this visit You Were Diagnosed With   
  
 Codes Comments Well woman exam    -  Primary ICD-10-CM: M74.381 ICD-9-CM: V72.31 Screening for breast cancer     ICD-10-CM: Z12.31 
ICD-9-CM: V76.10 Encounter for immunization     ICD-10-CM: D31 ICD-9-CM: V03.89 At risk for osteoporosis     ICD-10-CM: Z91.89 ICD-9-CM: V49.89 Hx of gastric bypass     ICD-10-CM: Z98.890 ICD-9-CM: V45.86 Screen for colon cancer     ICD-10-CM: Z12.11 ICD-9-CM: V76.51 Coronary artery disease involving native heart without angina pectoris, unspecified vessel or lesion type     ICD-10-CM: I25.10 ICD-9-CM: 414.01 Prediabetes     ICD-10-CM: R73.03 
ICD-9-CM: 790.29 Obesity (BMI 30-39. 9)     ICD-10-CM: E66.9 ICD-9-CM: 278.00 Vitals BP Pulse Temp Resp Height(growth percentile) Weight(growth percentile) 100/60 (BP 1 Location: Left arm, BP Patient Position: Sitting) 65 98.2 °F (36.8 °C) (Oral) 12 5' 6\" (1.676 m) 239 lb 3.2 oz (108.5 kg) SpO2 BMI OB Status Smoking Status 98% 38.61 kg/m2 Menopause Former Smoker BMI and BSA Data Body Mass Index Body Surface Area  
 38.61 kg/m 2 2.25 m 2 Preferred Pharmacy Pharmacy Name Phone [address] [facility] [phone] Your Updated Medication List  
  
   
This list is accurate as of: 10/24/17  9:46 AM.  Always use your most recent med list.  
  
  
  
  
 aspirin delayed-release 81 mg tablet Take 1 Tab by mouth daily. cyanocobalamin 500 mcg tablet Commonly known as:  VITAMIN B12 Take 500 mcg by mouth daily. GENTLE IRON PO Take 27 mg by mouth daily (with breakfast). HYDROmorphone 2 mg tablet Commonly known as:  DILAUDID  
  
 loratadine 10 mg tablet Commonly known as:  CLARITIN  
TAKE 1 TABLET DAILY  
  
 ONE DAILY WOMENS 50 PLUS PO Take  by mouth. OTHER Tumeric PO  
  
 pantoprazole 40 mg tablet Commonly known as:  PROTONIX Take 1 Tab by mouth two (2) times a day. rosuvastatin 5 mg tablet Commonly known as:  CRESTOR Take 1 Tab by mouth daily. VITAMIN C 500 mg tablet Generic drug:  ascorbic acid (vitamin C) Take  by mouth. Prescriptions Sent to Pharmacy Refills  
 aspirin delayed-release 81 mg tablet 0 Sig: Take 1 Tab by mouth daily. Class: Normal  
 Pharmacy: [address], [address] Ph #: [phone] Route: Oral  
 rosuvastatin (CRESTOR) 5 mg tablet 5 Sig: Take 1 Tab by mouth daily. Class: Normal  
 Pharmacy: [address], [address] Ph #: [phone] Route: Oral  
  
We Performed the Following INFLUENZA VIRUS VACCINE, PRESERVATIVE FREE SYRINGE, 3 YRS AND OLDER [92215 CPT(R)] Follow-up Instructions Return in about 1 week (around 10/31/2017) for GERD follow up, 1 year for well woman. To-Do List   
 10/24/2017 Imaging:  DEXA BONE DENSITY STUDY AXIAL   
  
 10/24/2017 Lab:  HEMOGLOBIN A1C WITH EAG   
  
 10/24/2017 Lab:  LIPID PANEL W/ REFLX DIRECT LDL   
  
 10/24/2017 Imaging:  JUDY MAMMO BI SCREENING INCL CAD   
  
 10/24/2017 Lab:  VITAMIN D, 25 HYDROXY   
  
 11/23/2017 Lab:  OCCULT BLOOD, IMMUNOASSAY (FIT) Patient Instructions Please call/message with Vit D dose for us to add to your chart. Vaccine Information Statement Influenza (Flu) Vaccine (Inactivated or Recombinant): What you need to know Many Vaccine Information Statements are available in [place] and other languages. See www.immunize.org/vis Hojas de Información Sobre Vacunas están disponibles en Español y en muchos otros idiomas. Visite www.immunize.org/vis 1. Why get vaccinated? Influenza (flu) is a contagious disease that spreads around the [facility] every year, usually between October and May. Flu is caused by influenza viruses, and is spread mainly by coughing, sneezing, and close contact. Anyone can get flu. Flu strikes suddenly and can last several days. Symptoms vary by age, but can include: 
 fever/chills  sore throat  muscle aches  fatigue  cough  headache  runny or stuffy nose Flu can also lead to pneumonia and blood infections, and cause diarrhea and seizures in children. If you have a medical condition, such as heart or lung disease, flu can make it worse. Flu is more dangerous for some people. Infants and young children, people 72years of age and older, pregnant women, and people with certain health conditions or a weakened immune system are at greatest risk. Each year thousands of people in the [facility] die from flu, and many more are hospitalized.   
 
Flu vaccine can: 
 keep you from getting flu, 
  make flu less severe if you do get it, and 
 keep you from spreading flu to your family and other people. 2. Inactivated and recombinant flu vaccines A dose of flu vaccine is recommended every flu season. Children 6 months through 6years of age may need two doses during the same flu season. Everyone else needs only one dose each flu season. Some inactivated flu vaccines contain a very small amount of a mercury-based preservative called thimerosal. Studies have not shown thimerosal in vaccines to be harmful, but flu vaccines that do not contain thimerosal are available. There is no live flu virus in flu shots. They cannot cause the flu. There are many flu viruses, and they are always changing. Each year a new flu vaccine is made to protect against three or four viruses that are likely to cause disease in the upcoming flu season. But even when the vaccine doesnt exactly match these viruses, it may still provide some protection Flu vaccine cannot prevent: 
 flu that is caused by a virus not covered by the vaccine, or 
 illnesses that look like flu but are not. It takes about 2 weeks for protection to develop after vaccination, and protection lasts through the flu season. 3. Some people should not get this vaccine Tell the person who is giving you the vaccine:  If you have any severe, life-threatening allergies. If you ever had a life-threatening allergic reaction after a dose of flu vaccine, or have a severe allergy to any part of this vaccine, you may be advised not to get vaccinated. Most, but not all, types of flu vaccine contain a small amount of egg protein.  If you ever had Guillain-Barré Syndrome (also called GBS). Some people with a history of GBS should not get this vaccine. This should be discussed with your doctor.  If you are not feeling well.    
It is usually okay to get flu vaccine when you have a mild illness, but you might be asked to come back when you feel better. 4. Risks of a vaccine reaction With any medicine, including vaccines, there is a chance of reactions. These are usually mild and go away on their own, but serious reactions are also possible. Most people who get a flu shot do not have any problems with it. Minor problems following a flu shot include:  
 soreness, redness, or swelling where the shot was given  hoarseness  sore, red or itchy eyes  cough  fever  aches  headache  itching  fatigue If these problems occur, they usually begin soon after the shot and last 1 or 2 days. More serious problems following a flu shot can include the following:  There may be a small increased risk of Guillain-Barré Syndrome (GBS) after inactivated flu vaccine. This risk has been estimated at 1 or 2 additional cases per million people vaccinated. This is much lower than the risk of severe complications from flu, which can be prevented by flu vaccine.  Young children who get the flu shot along with pneumococcal vaccine (PCV13) and/or DTaP vaccine at the same time might be slightly more likely to have a seizure caused by fever. Ask your doctor for more information. Tell your doctor if a child who is getting flu vaccine has ever had a seizure. Problems that could happen after any injected vaccine:  People sometimes faint after a medical procedure, including vaccination. Sitting or lying down for about 15 minutes can help prevent fainting, and injuries caused by a fall. Tell your doctor if you feel dizzy, or have vision changes or ringing in the ears.  Some people get severe pain in the shoulder and have difficulty moving the arm where a shot was given. This happens very rarely.  Any medication can cause a severe allergic reaction.  Such reactions from a vaccine are very rare, estimated at about 1 in a million doses, and would happen within a few minutes to a few hours after the vaccination. As with any medicine, there is a very remote chance of a vaccine causing a serious injury or death. The safety of vaccines is always being monitored. For more information, visit: www.cdc.gov/vaccinesafety/ 
 
5. What if there is a serious reaction? What should I look for?  Look for anything that concerns you, such as signs of a severe allergic reaction, very high fever, or unusual behavior. Signs of a severe allergic reaction can include hives, swelling of the face and throat, difficulty breathing, a fast heartbeat, dizziness, and weakness  usually within a few minutes to a few hours after the vaccination. What should I do?  If you think it is a severe allergic reaction or other emergency that cant wait, call 9-1-1 and get the person to the nearest hospital. Otherwise, call your doctor.  Reactions should be reported to the Vaccine Adverse Event Reporting System (VAERS). Your doctor should file this report, or you can do it yourself through  the VAERS web site at www.vaers. [facility].gov, or by calling 9-452.892.1625. VAERS does not give medical advice. 6. The National Vaccine Injury Compensation Program 
 
The [facility] Vaccine Injury Compensation Program (VICP) is a federal program that was created to compensate people who may have been injured by certain vaccines. Persons who believe they may have been injured by a vaccine can learn about the program and about filing a claim by calling 2-817.177.3799 or visiting the [employer] website at www.[facility].gov/vaccinecompensation. There is a time limit to file a claim for compensation. 7. How can I learn more?  Ask your healthcare provider. He or she can give you the vaccine package insert or suggest other sources of information.  Call your local or state health department.  
 Contact the Centers for Disease Control and Prevention (CDC): 
 - Call 1-298.652.4150 (1-800-CDC-INFO) or 
- Visit CDCs website at www.cdc.gov/flu Vaccine Information Statement Inactivated Influenza Vaccine 8/7/2015 
42 DALILA Lozano [id number] [facility] [place] Centers for Disease Control and Prevention Office Use Only Well Visit, Women 48 to 72: Care Instructions Your Care Instructions Physical exams can help you stay healthy. Your doctor has checked your overall health and may have suggested ways to take good care of yourself. He or she also may have recommended tests. At home, you can help prevent illness with healthy eating, regular exercise, and other steps. Follow-up care is a key part of your treatment and safety. Be sure to make and go to all appointments, and call your doctor if you are having problems. It's also a good idea to know your test results and keep a list of the medicines you take. How can you care for yourself at home? · Reach and stay at a healthy weight. This will lower your risk for many problems, such as obesity, diabetes, heart disease, and high blood pressure. · Get at least 30 minutes of exercise on most days of the week. Walking is a good choice. You also may want to do other activities, such as running, swimming, cycling, or playing tennis or team sports. · Do not smoke. Smoking can make health problems worse. If you need help quitting, talk to your doctor about stop-smoking programs and medicines. These can increase your chances of quitting for good. · Protect your skin from too much sun. When you're outdoors from 10 a.m. to 4 p.m., stay in the shade or cover up with clothing and a hat with a wide brim. Wear sunglasses that block UV rays. Even when it's cloudy, put broad-spectrum sunscreen (SPF 30 or higher) on any exposed skin. · See a dentist one or two times a year for checkups and to have your teeth cleaned. · Wear a seat belt in the car. · Limit alcohol to 1 drink a day. Too much alcohol can cause health problems. Follow your doctor's advice about when to have certain tests. These tests can spot problems early. · Cholesterol. Your doctor will tell you how often to have this done based on your age, family history, or other things that can increase your risk for heart attack and stroke. · Blood pressure. Have your blood pressure checked during a routine doctor visit. Your doctor will tell you how often to check your blood pressure based on your age, your blood pressure results, and other factors. · Mammogram. Ask your doctor how often you should have a mammogram, which is an X-ray of your breasts. A mammogram can spot breast cancer before it can be felt and when it is easiest to treat. · Pap test and pelvic exam. Ask your doctor how often you should have a Pap test. You may not need to have a Pap test as often as you used to. · Vision. Have your eyes checked every year or two or as often as your doctor suggests. Some experts recommend that you have yearly exams for glaucoma and other age-related eye problems starting at age 48. · Hearing. Tell your doctor if you notice any change in your hearing. You can have tests to find out how well you hear. · Diabetes. Ask your doctor whether you should have tests for diabetes. · Colon cancer. You should begin tests for colon cancer at age 48. You may have one of several tests. Your doctor will tell you how often to have tests based on your age and risk. Risks include whether you already had a precancerous polyp removed from your colon or whether your parents, sisters and brothers, or children have had colon cancer. · Thyroid disease. Talk to your doctor about whether to have your thyroid checked as part of a regular physical exam. Women have an increased chance of a thyroid problem. · Osteoporosis. You should begin tests for bone density at age 72.  If you are younger than 72, ask your doctor whether you have factors that may increase your risk for this disease. You may want to have this test before age 72. · Heart attack and stroke risk. At least every 4 to 6 years, you should have your risk for heart attack and stroke assessed. Your doctor uses factors such as your age, blood pressure, cholesterol, and whether you smoke or have diabetes to show what your risk for a heart attack or stroke is over the next 10 years. When should you call for help? Watch closely for changes in your health, and be sure to contact your doctor if you have any problems or symptoms that concern you. Where can you learn more? Go to http://maury-haven.info/. Enter S084 in the search box to learn more about \"Well Visit, Women 50 to 72: Care Instructions. \" Current as of: July 19, 2016 Content Version: 11.3 © 3336-3951 [employer]. Care instructions adapted under license by [employer] (which disclaims liability or warranty for this information). If you have questions about a medical condition or this instruction, always ask your healthcare professional. [address] any warranty or liability for your use of this information. Introducing \[facility]\"" & HEALTH SERVICES! Dear Breana Client: Thank you for requesting a [employer] account. Our records indicate that you already have an active [employer] account. You can access your account anytime at [email or web address] Did you know that you can access your hospital and ER discharge instructions at any time in [employer]? You can also review all of your test results from your hospital stay or ER visit. Additional Information If you have questions, please visit the Frequently Asked Questions section of the [employer] website at [email or web address] Remember, [employer] is NOT to be used for urgent needs. For medical emergencies, dial 911. Now available from your iPhone and Android! Please provide this summary of care documentation to your next provider. Your primary care clinician is listed as Chun Owen. If you have any questions after today's visit, please call [phone].

## 2017-10-24 NOTE — PATIENT INSTRUCTIONS
Please call/message with Vit D dose for us to add to your chart. Vaccine Information Statement    Influenza (Flu) Vaccine (Inactivated or Recombinant): What you need to know    Many Vaccine Information Statements are available in [place] and other languages. See www.immunize.org/vis  Hojas de Información Sobre Vacunas están disponibles en Español y en muchos otros idiomas. Visite www.immunize.org/vis    1. Why get vaccinated? Influenza (flu) is a contagious disease that spreads around the [place] every year, usually between October and May. Flu is caused by influenza viruses, and is spread mainly by coughing, sneezing, and close contact. Anyone can get flu. Flu strikes suddenly and can last several days. Symptoms vary by age, but can include:   fever/chills   sore throat   muscle aches   fatigue   cough   headache    runny or stuffy nose    Flu can also lead to pneumonia and blood infections, and cause diarrhea and seizures in children. If you have a medical condition, such as heart or lung disease, flu can make it worse. Flu is more dangerous for some people. Infants and young children, people 72years of age and older, pregnant women, and people with certain health conditions or a weakened immune system are at greatest risk. Each year thousands of people in the [facility] die from flu, and many more are hospitalized. Flu vaccine can:   keep you from getting flu,   make flu less severe if you do get it, and   keep you from spreading flu to your family and other people. 2. Inactivated and recombinant flu vaccines    A dose of flu vaccine is recommended every flu season. Children 6 months through 6years of age may need two doses during the same flu season. Everyone else needs only one dose each flu season.        Some inactivated flu vaccines contain a very small amount of a mercury-based preservative called thimerosal. Studies have not shown thimerosal in vaccines to be harmful, but flu vaccines that do not contain thimerosal are available. There is no live flu virus in flu shots. They cannot cause the flu. There are many flu viruses, and they are always changing. Each year a new flu vaccine is made to protect against three or four viruses that are likely to cause disease in the upcoming flu season. But even when the vaccine doesnt exactly match these viruses, it may still provide some protection    Flu vaccine cannot prevent:   flu that is caused by a virus not covered by the vaccine, or   illnesses that look like flu but are not. It takes about 2 weeks for protection to develop after vaccination, and protection lasts through the flu season. 3. Some people should not get this vaccine    Tell the person who is giving you the vaccine:     If you have any severe, life-threatening allergies. If you ever had a life-threatening allergic reaction after a dose of flu vaccine, or have a severe allergy to any part of this vaccine, you may be advised not to get vaccinated. Most, but not all, types of flu vaccine contain a small amount of egg protein.  If you ever had Guillain-Barré Syndrome (also called GBS). Some people with a history of GBS should not get this vaccine. This should be discussed with your doctor.  If you are not feeling well. It is usually okay to get flu vaccine when you have a mild illness, but you might be asked to come back when you feel better. 4. Risks of a vaccine reaction    With any medicine, including vaccines, there is a chance of reactions. These are usually mild and go away on their own, but serious reactions are also possible. Most people who get a flu shot do not have any problems with it.      Minor problems following a flu shot include:    soreness, redness, or swelling where the shot was given     hoarseness   sore, red or itchy eyes   cough   fever   aches   headache   itching   fatigue  If these problems occur, they usually begin soon after the shot and last 1 or 2 days. More serious problems following a flu shot can include the following:     There may be a small increased risk of Guillain-Barré Syndrome (GBS) after inactivated flu vaccine. This risk has been estimated at 1 or 2 additional cases per million people vaccinated. This is much lower than the risk of severe complications from flu, which can be prevented by flu vaccine.  Young children who get the flu shot along with pneumococcal vaccine (PCV13) and/or DTaP vaccine at the same time might be slightly more likely to have a seizure caused by fever. Ask your doctor for more information. Tell your doctor if a child who is getting flu vaccine has ever had a seizure. Problems that could happen after any injected vaccine:      People sometimes faint after a medical procedure, including vaccination. Sitting or lying down for about 15 minutes can help prevent fainting, and injuries caused by a fall. Tell your doctor if you feel dizzy, or have vision changes or ringing in the ears.  Some people get severe pain in the shoulder and have difficulty moving the arm where a shot was given. This happens very rarely.  Any medication can cause a severe allergic reaction. Such reactions from a vaccine are very rare, estimated at about 1 in a million doses, and would happen within a few minutes to a few hours after the vaccination. As with any medicine, there is a very remote chance of a vaccine causing a serious injury or death. The safety of vaccines is always being monitored. For more information, visit: www.cdc.gov/vaccinesafety/    5. What if there is a serious reaction? What should I look for?  Look for anything that concerns you, such as signs of a severe allergic reaction, very high fever, or unusual behavior.     Signs of a severe allergic reaction can include hives, swelling of the face and throat, difficulty breathing, a fast heartbeat, dizziness, and weakness  usually within a few minutes to a few hours after the vaccination. What should I do?  If you think it is a severe allergic reaction or other emergency that cant wait, call 9-1-1 and get the person to the nearest hospital. Otherwise, call your doctor.  Reactions should be reported to the Vaccine Adverse Event Reporting System (VAERS). Your doctor should file this report, or you can do it yourself through  the VAERS web site at www.vaers. [facility].gov, or by calling 4-208.222.5775. VAERS does not give medical advice. 6. The National Vaccine Injury Compensation Program    The [facility] Vaccine Injury Compensation Program (VICP) is a federal program that was created to compensate people who may have been injured by certain vaccines. Persons who believe they may have been injured by a vaccine can learn about the program and about filing a claim by calling 2-884.554.1514 or visiting the [address] [place] [address] [address] website at www.[facility].gov/vaccinecompensation. There is a time limit to file a claim for compensation. 7. How can I learn more?  Ask your healthcare provider. He or she can give you the vaccine package insert or suggest other sources of information.  Call your local or state health department.  Contact the Centers for Disease Control and Prevention (CDC):  - Call 6-624.904.8921 (1-800-CDC-INFO) or  - Visit CDCs website at www.cdc.gov/flu    Vaccine Information Statement   Inactivated Influenza Vaccine   8/7/2015  42 DALILA Horvath 366PS-92    Department of Health and Human Services  Centers for Disease Control and Prevention    Office Use Only       Well Visit, Women 48 to 72: Care Instructions  Your Care Instructions  Physical exams can help you stay healthy. Your doctor has checked your overall health and may have suggested ways to take good care of yourself.  He or she also may have recommended tests. At home, you can help prevent illness with healthy eating, regular exercise, and other steps. Follow-up care is a key part of your treatment and safety. Be sure to make and go to all appointments, and call your doctor if you are having problems. It's also a good idea to know your test results and keep a list of the medicines you take. How can you care for yourself at home? · Reach and stay at a healthy weight. This will lower your risk for many problems, such as obesity, diabetes, heart disease, and high blood pressure. · Get at least 30 minutes of exercise on most days of the week. Walking is a good choice. You also may want to do other activities, such as running, swimming, cycling, or playing tennis or team sports. · Do not smoke. Smoking can make health problems worse. If you need help quitting, talk to your doctor about stop-smoking programs and medicines. These can increase your chances of quitting for good. · Protect your skin from too much sun. When you're outdoors from 10 a.m. to 4 p.m., stay in the shade or cover up with clothing and a hat with a wide brim. Wear sunglasses that block UV rays. Even when it's cloudy, put broad-spectrum sunscreen (SPF 30 or higher) on any exposed skin. · See a dentist one or two times a year for checkups and to have your teeth cleaned. · Wear a seat belt in the car. · Limit alcohol to 1 drink a day. Too much alcohol can cause health problems. Follow your doctor's advice about when to have certain tests. These tests can spot problems early. · Cholesterol. Your doctor will tell you how often to have this done based on your age, family history, or other things that can increase your risk for heart attack and stroke. · Blood pressure. Have your blood pressure checked during a routine doctor visit. Your doctor will tell you how often to check your blood pressure based on your age, your blood pressure results, and other factors.   · Mammogram. Ask your doctor how often you should have a mammogram, which is an X-ray of your breasts. A mammogram can spot breast cancer before it can be felt and when it is easiest to treat. · Pap test and pelvic exam. Ask your doctor how often you should have a Pap test. You may not need to have a Pap test as often as you used to. · Vision. Have your eyes checked every year or two or as often as your doctor suggests. Some experts recommend that you have yearly exams for glaucoma and other age-related eye problems starting at age 48. · Hearing. Tell your doctor if you notice any change in your hearing. You can have tests to find out how well you hear. · Diabetes. Ask your doctor whether you should have tests for diabetes. · Colon cancer. You should begin tests for colon cancer at age 48. You may have one of several tests. Your doctor will tell you how often to have tests based on your age and risk. Risks include whether you already had a precancerous polyp removed from your colon or whether your parents, sisters and brothers, or children have had colon cancer. · Thyroid disease. Talk to your doctor about whether to have your thyroid checked as part of a regular physical exam. Women have an increased chance of a thyroid problem. · Osteoporosis. You should begin tests for bone density at age 72. If you are younger than 72, ask your doctor whether you have factors that may increase your risk for this disease. You may want to have this test before age 72. · Heart attack and stroke risk. At least every 4 to 6 years, you should have your risk for heart attack and stroke assessed. Your doctor uses factors such as your age, blood pressure, cholesterol, and whether you smoke or have diabetes to show what your risk for a heart attack or stroke is over the next 10 years. When should you call for help? Watch closely for changes in your health, and be sure to contact your doctor if you have any problems or symptoms that concern you.   Where can you learn more? Go to http://maury-haven.info/. Enter M803 in the search box to learn more about \"Well Visit, Women 50 to 72: Care Instructions. \"  Current as of: July 19, 2016  Content Version: 11.3  © 6767-7805 [employer]. Care instructions adapted under license by [employer] (which disclaims liability or warranty for this information). If you have questions about a medical condition or this instruction, always ask your healthcare professional. [place] [address] any warranty or liability for your use of this information.

## 2017-10-25 LAB — 25(OH)D3 SERPL-MCNC: 35.2 NG/ML (ref 30–100)

## 2017-10-31 ENCOUNTER — HOSPITAL ENCOUNTER (OUTPATIENT)
Dept: LAB | Age: 57
Discharge: HOME | End: 2017-10-31
Payer: OTHER GOVERNMENT

## 2017-10-31 ENCOUNTER — OFFICE VISIT (OUTPATIENT)
Dept: FAMILY MEDICINE CLINIC | Age: 57
End: 2017-10-31

## 2017-10-31 VITALS
WEIGHT: 240.8 LBS | DIASTOLIC BLOOD PRESSURE: 60 MMHG | BODY MASS INDEX: 38.7 KG/M2 | HEART RATE: 72 BPM | RESPIRATION RATE: 12 BRPM | OXYGEN SATURATION: 98 % | HEIGHT: 66 IN | SYSTOLIC BLOOD PRESSURE: 104 MMHG | TEMPERATURE: 98.1 F

## 2017-10-31 DIAGNOSIS — K21.9 GASTROESOPHAGEAL REFLUX DISEASE WITHOUT ESOPHAGITIS: ICD-10-CM

## 2017-10-31 DIAGNOSIS — D50.9 IRON DEFICIENCY ANEMIA, UNSPECIFIED IRON DEFICIENCY ANEMIA TYPE: ICD-10-CM

## 2017-10-31 DIAGNOSIS — Z87.11 HX OF PEPTIC ULCER: ICD-10-CM

## 2017-10-31 DIAGNOSIS — K21.9 GASTROESOPHAGEAL REFLUX DISEASE WITHOUT ESOPHAGITIS: Primary | ICD-10-CM

## 2017-10-31 DIAGNOSIS — Z51.81 ENCOUNTER FOR MEDICATION MONITORING: ICD-10-CM

## 2017-10-31 DIAGNOSIS — K90.89 OTHER SPECIFIED INTESTINAL MALABSORPTION: ICD-10-CM

## 2017-10-31 DIAGNOSIS — I25.10 CORONARY ARTERY DISEASE INVOLVING NATIVE HEART WITHOUT ANGINA PECTORIS, UNSPECIFIED VESSEL OR LESION TYPE: ICD-10-CM

## 2017-10-31 DIAGNOSIS — Z98.84 HX OF GASTRIC BYPASS: ICD-10-CM

## 2017-10-31 DIAGNOSIS — E66.9 OBESITY (BMI 30-39.9): ICD-10-CM

## 2017-10-31 LAB
ALBUMIN SERPL-MCNC: 3.7 G/DL (ref 3.4–5)
ALBUMIN/GLOB SERPL: 1.2 {RATIO} (ref 0.8–1.7)
ALP SERPL-CCNC: 111 U/L (ref 45–117)
ALT SERPL-CCNC: 24 U/L (ref 13–56)
ANION GAP SERPL CALC-SCNC: 10 MMOL/L (ref 3–18)
AST SERPL-CCNC: 15 U/L (ref 15–37)
BASOPHILS # BLD: 0 K/UL (ref 0–0.06)
BASOPHILS NFR BLD: 1 % (ref 0–2)
BILIRUB SERPL-MCNC: 0.4 MG/DL (ref 0.2–1)
BUN SERPL-MCNC: 17 MG/DL (ref 7–18)
BUN/CREAT SERPL: 21 (ref 12–20)
CALCIUM SERPL-MCNC: 8.7 MG/DL (ref 8.5–10.1)
CHLORIDE SERPL-SCNC: 107 MMOL/L (ref 100–108)
CO2 SERPL-SCNC: 25 MMOL/L (ref 21–32)
CREAT SERPL-MCNC: 0.8 MG/DL (ref 0.6–1.3)
DIFFERENTIAL METHOD BLD: ABNORMAL
EOSINOPHIL # BLD: 0.2 K/UL (ref 0–0.4)
EOSINOPHIL NFR BLD: 3 % (ref 0–5)
ERYTHROCYTE [DISTWIDTH] IN BLOOD BY AUTOMATED COUNT: 17.8 % (ref 11.6–14.5)
FOLATE SERPL-MCNC: 16.3 NG/ML (ref 3.1–17.5)
GLOBULIN SER CALC-MCNC: 3.2 G/DL (ref 2–4)
GLUCOSE SERPL-MCNC: 80 MG/DL (ref 74–99)
HCT VFR BLD AUTO: 35.7 % (ref 35–45)
HGB BLD-MCNC: 10.9 G/DL (ref 12–16)
IRON SATN MFR SERPL: 5 %
IRON SERPL-MCNC: 22 UG/DL (ref 50–175)
LYMPHOCYTES # BLD: 1.9 K/UL (ref 0.9–3.6)
LYMPHOCYTES NFR BLD: 31 % (ref 21–52)
MAGNESIUM SERPL-MCNC: 2.6 MG/DL (ref 1.6–2.6)
MCH RBC QN AUTO: 18.9 PG (ref 24–34)
MCHC RBC AUTO-ENTMCNC: 30.5 G/DL (ref 31–37)
MCV RBC AUTO: 61.8 FL (ref 74–97)
MONOCYTES # BLD: 0.3 K/UL (ref 0.05–1.2)
MONOCYTES NFR BLD: 6 % (ref 3–10)
NEUTS SEG # BLD: 3.7 K/UL (ref 1.8–8)
NEUTS SEG NFR BLD: 59 % (ref 40–73)
PLATELET # BLD AUTO: 218 K/UL (ref 135–420)
POTASSIUM SERPL-SCNC: 4.4 MMOL/L (ref 3.5–5.5)
PROT SERPL-MCNC: 6.9 G/DL (ref 6.4–8.2)
RBC # BLD AUTO: 5.78 M/UL (ref 4.2–5.3)
SODIUM SERPL-SCNC: 142 MMOL/L (ref 136–145)
TIBC SERPL-MCNC: 450 UG/DL (ref 250–450)
VIT B12 SERPL-MCNC: 351 PG/ML (ref 211–911)
WBC # BLD AUTO: 6.2 K/UL (ref 4.6–13.2)

## 2017-10-31 PROCEDURE — 83735 ASSAY OF MAGNESIUM: CPT | Performed by: FAMILY MEDICINE

## 2017-10-31 PROCEDURE — 83540 ASSAY OF IRON: CPT | Performed by: FAMILY MEDICINE

## 2017-10-31 PROCEDURE — 80053 COMPREHEN METABOLIC PANEL: CPT | Performed by: FAMILY MEDICINE

## 2017-10-31 PROCEDURE — 85025 COMPLETE CBC W/AUTO DIFF WBC: CPT | Performed by: FAMILY MEDICINE

## 2017-10-31 PROCEDURE — 84425 ASSAY OF VITAMIN B-1: CPT | Performed by: FAMILY MEDICINE

## 2017-10-31 PROCEDURE — 82607 VITAMIN B-12: CPT | Performed by: FAMILY MEDICINE

## 2017-10-31 RX ORDER — PANTOPRAZOLE SODIUM 40 MG/1
80 TABLET, DELAYED RELEASE ORAL 2 TIMES DAILY
Qty: 120 TAB | Refills: 2 | Status: SHIPPED | OUTPATIENT
Start: 2017-10-31 | End: 2017-11-30

## 2017-10-31 NOTE — MR AVS SNAPSHOT
Visit Information Date & Time Provider Department Dept. Phone Encounter #  
 10/31/2017  9:00 AM Ml Ruiz MD [facility] [phone] [id number] Follow-up Instructions Return in about 6 months (around 4/30/2018) for chronic medical conditions, lab plan to follow, (30). Your Appointments 10/29/2018  9:00 AM  
PAP/PELVIC with Ml Ruiz MD  
[facility]) Appt Note: 1 year for well woman  
 [facility] [address] [place] [address]  
[phone]  
  
   
 [facility] [address] [place] [address] Upcoming Health Maintenance Date Due FOBT Q 1 YEAR AGE 50-75 12/4/2008 BREAST CANCER SCRN MAMMOGRAM 10/16/2015 PAP AKA CERVICAL CYTOLOGY 10/1/2020 DTaP/Tdap/Td series (2 - Td) 3/6/2024 Allergies as of 10/31/2017  Review Complete On: 10/31/2017 By: Ml Ruiz MD  
  
 Severity Noted Reaction Type Reactions Morphine (Pf)  06/11/2013    Unknown (comments) Increases Pain- MAKES PAIN WORST Pcn [Penicillins]  06/11/2013    Unknown (comments) Unknown reaction. Current Immunizations  Reviewed on 10/1/2015 Name Date Influenza Vaccine 9/15/2012 Influenza Vaccine (Quad) PF 10/24/2017, 1/31/2017, 10/1/2015 Tdap 3/6/2014 Not reviewed this visit You Were Diagnosed With   
  
 Codes Comments Gastroesophageal reflux disease without esophagitis    -  Primary ICD-10-CM: K21.9 ICD-9-CM: 530.81 Hx of peptic ulcer     ICD-10-CM: Z87.11 ICD-9-CM: V12.71 Iron deficiency anemia, unspecified iron deficiency anemia type     ICD-10-CM: D50.9 ICD-9-CM: 280.9 Hx of gastric bypass     ICD-10-CM: Z98.890 ICD-9-CM: V45.86 Obesity (BMI 30-39. 9)     ICD-10-CM: E66.9 ICD-9-CM: 278.00  Encounter for medication monitoring     ICD-10-CM: Z51.81 
ICD-9-CM: V58.83   
 Other specified intestinal malabsorption     ICD-10-CM: K90.89 ICD-9-CM: 579.8 Coronary artery disease involving native heart without angina pectoris, unspecified vessel or lesion type     ICD-10-CM: I25.10 ICD-9-CM: 414.01 Vitals BP Pulse Temp Resp Height(growth percentile) Weight(growth percentile) 104/60 (BP 1 Location: Left arm, BP Patient Position: Sitting) 72 98.1 °F (36.7 °C) (Oral) 12 5' 6\" (1.676 m) 240 lb 12.8 oz (109.2 kg) SpO2 BMI OB Status Smoking Status 98% 38.87 kg/m2 Menopause Former Smoker Vitals History BMI and BSA Data Body Mass Index Body Surface Area  
 38.87 kg/m 2 2.26 m 2 Preferred Pharmacy Pharmacy Name Phone [facility] [address] [place] [address] Rd [phone] Your Updated Medication List  
  
   
This list is accurate as of: 10/31/17  9:44 AM.  Always use your most recent med list.  
  
  
  
  
 aspirin delayed-release 81 mg tablet Take 1 Tab by mouth daily. cyanocobalamin 500 mcg tablet Commonly known as:  VITAMIN B12 Take 500 mcg by mouth daily. GENTLE IRON PO Take 27 mg by mouth daily (with breakfast). HYDROmorphone 2 mg tablet Commonly known as:  DILAUDID  
  
 loratadine 10 mg tablet Commonly known as:  CLARITIN  
TAKE 1 TABLET DAILY  
  
 ONE DAILY WOMENS 50 PLUS PO Take  by mouth. OTHER Tumeric PO  
  
 pantoprazole 40 mg tablet Commonly known as:  PROTONIX Take 2 Tabs by mouth two (2) times a day for 30 days. rosuvastatin 5 mg tablet Commonly known as:  CRESTOR Take 1 Tab by mouth daily. VITAMIN C 500 mg tablet Generic drug:  ascorbic acid (vitamin C) Take  by mouth. Prescriptions Sent to Pharmacy Refills  
 pantoprazole (PROTONIX) 40 mg tablet 2 Sig: Take 2 Tabs by mouth two (2) times a day for 30 days.   
 Class: Normal  
 Pharmacy: [facility] [address] [facility] [phone] Route: Oral  
  
Follow-up Instructions Return in about 6 months (around 4/30/2018) for chronic medical conditions, lab plan to follow, (30). To-Do List   
 11/08/2017 4:30 PM  
  Appointment with TAMI KIM RM 2 at [address] [place] [address] ([phone]) OUTSIDE FILMS  - Any outside films related to the study being scheduled should be brought with you on the day of the exam.  If this cannot be done there may be a delay in the reading of the study. MEDICATIONS  - Patient must bring a complete list of all medications currently taking to include prescriptions, over-the-counter meds, herbals, vitamins & any dietary supplements  GENERAL INSTRUCTIONS  - On the day of your exam do not use any bath powder, deodorant or lotions on the armpit area. -Tenderness of breasts may cause an increase of discomfort during procedure. If you are experiencing breast tenderness on the day of your appointment and would like to reschedule, please call [phone]. Patient Instructions Goal calcium intake 1200 mg/day Please return the stool card Introducing [facility]! Dear Mike Cee: Thank you for requesting a [employer] account. Our records indicate that you already have an active [employer] account. You can access your account anytime at [email or web address] or [email or web address]. [email or web address] or Bro Did you know that you can access your hospital and ER discharge instructions at any time in [employer]? You can also review all of your test results from your hospital stay or ER visit. Additional Information If you have questions, please visit the Frequently Asked Questions section of the [employer] website at [email or web address] [email or web address] or Bro/. Remember, [employer] is NOT to be used for urgent needs. For medical emergencies, dial 911. Now available from your iPhone and Android! Please provide this summary of care documentation to your next provider. Your primary care clinician is listed as Benito Schmitz. If you have any questions after today's visit, please call [phone].

## 2017-10-31 NOTE — PROGRESS NOTES
Patient here for f/u on her GERD she states her Protonix is not working well for her. 1. Have you been to the ER, urgent care clinic since your last visit? Hospitalized since your last visit? No    2. Have you seen or consulted any other health care providers outside of the [address] [place] [address] since your last visit? Include any pap smears or colon screening. No   Health Maintenance reviewed - Addressed 10/24/17.

## 2017-10-31 NOTE — PROGRESS NOTES
Jeevan Roa is a 60 y.o. female   Continues to recover from her shoulder surgery without concern    FU GERD:  history gastric bypass and GI bleed, unclear PUD vs anastomotic vs other. Last EDG 2010. GERD symptoms previously well controlled on esomeprazole 40 mg daily x years, since that UGI bleed. Prescription was changed to pantoprazole 40 mg daily in accordance with formulary preference as of 9/21/2017. Since then constant burning in throat regardless of diet. associated with cough    Iron deficiency anemia on replacement    Has been seen by GI. Endoscopy scheduled for Nov 15. Gastric bypass status:   Not on calcium - \"I drink a ton of milk\" 24- 35 oz/day    Follow-up prediabetes  Follow-up lipids on rosuvastatin in setting of CAD. Asymptomatic      Patient Care Team:  Adal Field MD as PCP - General (Family Practice)  Paddy Lewis MD (Dermatology)  Mala Denton MD (Orthopedic Surgery)  Lucio Bryant MD  Allergies   Allergen Reactions    Morphine (Pf) Unknown (comments)     Increases Pain- MAKES PAIN WORST    Pcn [Penicillins] Unknown (comments)     Unknown reaction. Outpatient Prescriptions Marked as Taking for the 10/31/17 encounter (Office Visit) with Adal Field MD   Medication Sig Dispense Refill    HYDROmorphone (DILAUDID) 2 mg tablet       aspirin delayed-release 81 mg tablet Take 1 Tab by mouth daily. 90 Tab 0    rosuvastatin (CRESTOR) 5 mg tablet Take 1 Tab by mouth daily. 90 Tab 5    pantoprazole (PROTONIX) 40 mg tablet Take 1 Tab by mouth two (2) times a day. 180 Tab 0    OTHER Tumeric PO      IRON BIS-GLYCINAT/VIT C/FA/B12 (GENTLE IRON PO) Take 27 mg by mouth daily (with breakfast).  cyanocobalamin (VITAMIN B12) 500 mcg tablet Take 500 mcg by mouth daily.  ascorbic acid (VITAMIN C) 500 mg tablet Take  by mouth.  FOLIC ACID/MULTIVITS-MIN (ONE DAILY WOMENS 50 PLUS PO) Take  by mouth.        Patient Active Problem List    Diagnosis    Coronary artery disease involving native heart without angina pectoris     11/2016 by cardiac cath. CP [place].  Hx of peptic ulcer     Notes dated 10/8/10: marginal ulcer NSAID induced. Plan for PPI x 3 months only. Dr. Jeremiah Blue.  Thalassemia trait    Iron deficiency anemia     GI bleed, history of some sort of ulcer, unclear if anastomotic ulcer or long term PPI plan, gastric bypass: iron infusion required 2010, EGD 2010.  Prediabetes    Hx of gastric bypass    Obesity (BMI 30-39. 9)    Gastroesophageal reflux disease without esophagitis    Allergic conjunctivitis and rhinitis     Past Medical History:   Diagnosis Date    Chemical pneumonia ([facility] unknown    Gall bladder disease unknown    Gastric bypass status for obesity unknown    Hernia unknown    Knee joint replacement status unknown    Muscle ache unknown    Pendulous abdomen unknown    Rosacea     Thalassemia trait     Ulcer ([facility] [address] unknown     Past Surgical History:   Procedure Laterality Date    HX CHOLECYSTECTOMY      HX GASTRIC BYPASS      HX GYN      HX ORTHOPAEDIC      BILATERAL KNEE REPLACEMENT    HX ORTHOPAEDIC Right 08/29/2017    Valeria Morrison MD: subacromial decompression , rotator cuff repair, biceps tenotomy     Family History   Problem Relation Age of Onset    Cancer Mother     Other Father 54     \"massive\" thrombosis from leg to heart    Heart Attack Paternal Uncle 61    Heart Attack Paternal Uncle 62    Breast Cancer Neg Hx     Colon Cancer Neg Hx      Social History     Social History    Marital status:      Spouse name: N/A    Number of children: N/A    Years of education: N/A     Occupational History    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 Gastrointestinal: Negative for abdominal pain, blood in stool, nausea and vomiting. No dysphagia or odynophagia     Visit Vitals    /60 (BP 1 Location: Left arm, BP Patient Position: Sitting)    Pulse 72    Temp 98.1 °F (36.7 °C) (Oral)    Resp 12    Ht 5' 6\" (1.676 m)    Wt 240 lb 12.8 oz (109.2 kg)    SpO2 98%    BMI 38.87 kg/m2     Physical Exam   Constitutional: She is oriented to person, place, and time. She appears well-developed. No distress. Pleasant obese white female   HENT:   Head: Normocephalic and atraumatic. Pulmonary/Chest: Effort normal.   Neurological: She is alert and oriented to person, place, and time. Psychiatric: She has a normal mood and affect. Her behavior is normal. Judgment and thought content normal.         Lab Results   Component Value Date/Time    Iron 18 06/11/2015 11:12 AM    TIBC 448 06/11/2015 11:12 AM    Iron % saturation 4 06/11/2015 11:12 AM    Ferritin 5 06/11/2015 11:12 AM      Lab Results   Component Value Date/Time    WBC 6.3 06/11/2015 11:12 AM    HGB 9.1 06/11/2015 11:12 AM    HCT 33.2 06/11/2015 11:12 AM    PLATELET 459 37/13/3654 11:12 AM    MCV 60 06/11/2015 11:12 AM     Lab Results   Component Value Date/Time    Sodium 142 06/11/2015 11:12 AM    Potassium 4.6 06/11/2015 11:12 AM    Chloride 104 06/11/2015 11:12 AM    CO2 20 06/11/2015 11:12 AM    Anion gap 9 09/14/2010 11:42 AM    Glucose 83 06/11/2015 11:12 AM    BUN 18 06/11/2015 11:12 AM    Creatinine 0.72 06/11/2015 11:12 AM    BUN/Creatinine ratio 25 06/11/2015 11:12 AM    GFR est  06/11/2015 11:12 AM    GFR est non-AA 94 06/11/2015 11:12 AM    Calcium 9.2 06/11/2015 11:12 AM    Bilirubin, total 0.3 06/11/2015 11:12 AM    AST (SGOT) 17 06/11/2015 11:12 AM    Alk.  phosphatase 78 06/11/2015 11:12 AM    Protein, total 6.3 06/11/2015 11:12 AM    Albumin 4.4 06/11/2015 11:12 AM    Globulin 3.1 09/14/2010 11:42 AM    A-G Ratio 2.3 06/11/2015 11:12 AM    ALT (SGPT) 11 06/11/2015 11:12 AM     Lab Results   Component Value Date/Time    Vitamin B12 370 06/11/2015 11:12 AM    Folate 18.9 06/11/2015 11:12 AM     Lab Results   Component Value Date/Time    Vitamin D 25-Hydroxy 35.2 10/24/2017 10:01 AM           Results for orders placed or performed during the hospital encounter of 10/24/17   VITAMIN D, 25 HYDROXY   Result Value Ref Range    Vitamin D 25-Hydroxy 35.2 30 - 100 ng/mL   LIPID PANEL W/ REFLX DIRECT LDL   Result Value Ref Range    LIPID PROFILE          Cholesterol, total 114 <200 MG/DL    Triglyceride 90 <150 MG/DL    HDL Cholesterol 51 40 - 60 MG/DL    LDL, calculated 45 0 - 100 MG/DL    VLDL, calculated 18 MG/DL    CHOL/HDL Ratio 2.2 0 - 5.0     HEMOGLOBIN A1C WITH EAG   Result Value Ref Range    Hemoglobin A1c 5.2 4.2 - 5.6 %    Est. average glucose 103 mg/dL      Lab Results   Component Value Date/Time    Hemoglobin A1c 5.2 10/24/2017 10:01 AM    Hemoglobin A1c 5.7 06/11/2015 11:12 AM    Glucose 83 06/11/2015 11:12 AM    LDL, calculated 45 10/24/2017 10:01 AM    Creatinine 0.72 06/11/2015 11:12 AM           ICD-10-CM ICD-9-CM    1. Gastroesophageal reflux disease without esophagitis K21.9 530.81 VITAMIN B12 & FOLATE      METABOLIC PANEL, COMPREHENSIVE      MAGNESIUM      pantoprazole (PROTONIX) 40 mg tablet   2. Hx of peptic ulcer Z87.11 V12.71 pantoprazole (PROTONIX) 40 mg tablet   3. Iron deficiency anemia, unspecified iron deficiency anemia type D50.9 280.9 IRON PROFILE      CBC WITH AUTOMATED DIFF   4. Hx of gastric bypass Z98.890 V45.86    5. Obesity (BMI 30-39. 9) E66.9 278.00    6. Encounter for medication monitoring Z51.81 V58.83 VITAMIN B12 & FOLATE      METABOLIC PANEL, COMPREHENSIVE      MAGNESIUM   7. Other specified intestinal malabsorption K90.89 579.8 VITAMIN B1, WHOLE BLOOD      METABOLIC PANEL, COMPREHENSIVE   8.  Coronary artery disease involving native heart without angina pectoris, unspecified vessel or lesion type I25.10 414.01      GERD: Uncontrolled. Increase pantoprazole to 80 mg twice daily pending EGD. Revisited nonpharmacological strategies. Malnutrition secondary to gastric bypass: Status? Ensure calcium 1200 mg daily    Iron deficiency anemia in the setting of gastric bypass status and history of UGI bleed. On replacement. EGD scheduled with Dr. Peyton Mohr to whom I will forward my notes. The patient understands our medical plan. Alternatives have been explained and offered. The risks, benefits and significant side effects of all medications have been reviewed. Anticipated time course and progression of condition reviewed. All questions have been addressed. She is encouraged to employ the information provided in the after visit summary, which was reviewed. She is instructed to call the clinic if she has not been notified either by phone or through [address] with the results of her tests or with an appointment plan for any referrals within 1 week(s). No news is not good news; it's no news. The patient  is to call if her condition worsens or fails to improve or if significant side effects are experienced. Follow-up Disposition: Not on File     Dragon medical dictation software was used for portions of this report. Unintended voice recognition errors may occur.

## 2017-11-02 NOTE — PROGRESS NOTES
Please fax to GI. Please see my result notes to her (let me know if you can't see that easily).  REX

## 2017-11-03 LAB — VIT B1 BLD-SCNC: 109.3 NMOL/L (ref 66.5–200)

## 2017-11-08 ENCOUNTER — HOSPITAL ENCOUNTER (OUTPATIENT)
Dept: MAMMOGRAPHY | Age: 57
Discharge: HOME | End: 2017-11-08
Attending: FAMILY MEDICINE
Payer: OTHER GOVERNMENT

## 2017-11-08 DIAGNOSIS — Z12.39 SCREENING FOR BREAST CANCER: ICD-10-CM

## 2017-11-08 PROCEDURE — 77067 SCR MAMMO BI INCL CAD: CPT

## 2017-11-28 ENCOUNTER — HOSPITAL ENCOUNTER (OUTPATIENT)
Dept: INFUSION THERAPY | Age: 57
Discharge: HOME | End: 2017-11-28
Payer: OTHER GOVERNMENT

## 2017-11-28 VITALS
HEART RATE: 71 BPM | DIASTOLIC BLOOD PRESSURE: 70 MMHG | OXYGEN SATURATION: 98 % | RESPIRATION RATE: 18 BRPM | SYSTOLIC BLOOD PRESSURE: 109 MMHG | TEMPERATURE: 98 F

## 2017-11-28 DIAGNOSIS — I25.10 CORONARY ARTERY DISEASE INVOLVING NATIVE HEART WITHOUT ANGINA PECTORIS, UNSPECIFIED VESSEL OR LESION TYPE: ICD-10-CM

## 2017-11-28 PROCEDURE — 96374 THER/PROPH/DIAG INJ IV PUSH: CPT

## 2017-11-28 PROCEDURE — 96375 TX/PRO/DX INJ NEW DRUG ADDON: CPT

## 2017-11-28 PROCEDURE — 74011250636 HC RX REV CODE- 250/636: Performed by: INTERNAL MEDICINE

## 2017-11-28 PROCEDURE — 74011250637 HC RX REV CODE- 250/637: Performed by: INTERNAL MEDICINE

## 2017-11-28 RX ORDER — ACETAMINOPHEN 325 MG/1
650 TABLET ORAL ONCE
Status: COMPLETED | OUTPATIENT
Start: 2017-11-28 | End: 2017-11-28

## 2017-11-28 RX ORDER — SODIUM CHLORIDE 0.9 % (FLUSH) 0.9 %
10-40 SYRINGE (ML) INJECTION AS NEEDED
Status: DISCONTINUED | OUTPATIENT
Start: 2017-11-28 | End: 2017-12-02 | Stop reason: HOSPADM

## 2017-11-28 RX ORDER — ASPIRIN 81 MG/1
81 TABLET ORAL DAILY
Qty: 90 TAB | Refills: 0 | Status: SHIPPED | OUTPATIENT
Start: 2017-11-28

## 2017-11-28 RX ORDER — DIPHENHYDRAMINE HYDROCHLORIDE 50 MG/ML
25 INJECTION, SOLUTION INTRAMUSCULAR; INTRAVENOUS ONCE
Status: COMPLETED | OUTPATIENT
Start: 2017-11-28 | End: 2017-11-28

## 2017-11-28 RX ADMIN — DIPHENHYDRAMINE HYDROCHLORIDE 25 MG: 50 INJECTION, SOLUTION INTRAMUSCULAR; INTRAVENOUS at 13:27

## 2017-11-28 RX ADMIN — FERRIC CARBOXYMALTOSE INJECTION 750 MG: 50 INJECTION, SOLUTION INTRAVENOUS at 13:47

## 2017-11-28 RX ADMIN — ACETAMINOPHEN 650 MG: 325 TABLET ORAL at 13:27

## 2017-11-28 NOTE — PROGRESS NOTES
[facility] Progress Note    Date: 2017    Name: Sahra Rapp    MRN: [id number]         : 1960      Ms. Steele to [facility], ambulatory at . Pt was assessed and education was provided. Care notes reviewed and signed. Ms. Leary Bowels vitals were reviewed and patient was observed for 5 minutes prior to treatment. Visit Vitals    /70 (BP 1 Location: Left arm, BP Patient Position: Sitting)    Pulse 71    Temp 98 °F (36.7 °C)    Resp 18    SpO2 98%    Breastfeeding No       #24 PIV started x 1 attempt in the right hand. Patient tolerated well. Premedications given per order, Tylenol 650mg PO and Benedryl 25mg IV push. Injectafer 750 mg IV push over 10 minutes. IV removed from right hand. No irritation or bleeding noted at site. Gauze and coban applied. Patient stayed for 30 minutes post infusion for observation. Vital signs stable. Care notes/discharge instructions reinforced with patient. Patient Vitals for the past 8 hrs:   Temp Pulse Resp BP SpO2   17 1430 98 °F (36.7 °C) 71 18 109/70 98 %   17 1312 98.9 °F (37.2 °C) 69 18 113/72 97 %          Ms. Steele tolerated well, and had no complaints. Patient armband removed and shredded. Ms. Michelle Morton was discharged from [place] [address] in stable condition at 1430. She is to return on 17 at 1300 for her next [facility] appointment.     Bk Dawkins RN  2017

## 2017-11-28 NOTE — TELEPHONE ENCOUNTER
This patient contacted office for the following prescriptions to be filled:    Medication requested :   Requested Prescriptions     Pending Prescriptions Disp Refills    aspirin delayed-release 81 mg tablet 90 Tab 0     Sig: Take 1 Tab by mouth daily. (Patient stated [facility] would not fill this one, she is requesting a [place]   Please call when ready)   PCP: Dr. Erin Mcclure or Print: Print  Mail order or Local pharmacy: Print    Scheduled appointment if not seen by current providers in office: [address] [place] [address] 10/31/17, next appt 10/29/18

## 2017-12-04 RX ORDER — SODIUM CHLORIDE 0.9 % (FLUSH) 0.9 %
10-40 SYRINGE (ML) INJECTION AS NEEDED
Status: DISCONTINUED | OUTPATIENT
Start: 2017-12-05 | End: 2017-12-09 | Stop reason: HOSPADM

## 2017-12-05 ENCOUNTER — HOSPITAL ENCOUNTER (OUTPATIENT)
Dept: INFUSION THERAPY | Age: 57
Discharge: HOME | End: 2017-12-05
Payer: OTHER GOVERNMENT

## 2017-12-05 VITALS
HEART RATE: 63 BPM | RESPIRATION RATE: 18 BRPM | OXYGEN SATURATION: 99 % | DIASTOLIC BLOOD PRESSURE: 72 MMHG | SYSTOLIC BLOOD PRESSURE: 108 MMHG | TEMPERATURE: 97.9 F

## 2017-12-05 PROCEDURE — 74011250636 HC RX REV CODE- 250/636: Performed by: INTERNAL MEDICINE

## 2017-12-05 PROCEDURE — 96374 THER/PROPH/DIAG INJ IV PUSH: CPT

## 2017-12-05 PROCEDURE — 74011250637 HC RX REV CODE- 250/637: Performed by: INTERNAL MEDICINE

## 2017-12-05 RX ORDER — ACETAMINOPHEN 325 MG/1
650 TABLET ORAL ONCE
Status: COMPLETED | OUTPATIENT
Start: 2017-12-05 | End: 2017-12-05

## 2017-12-05 RX ORDER — DIPHENHYDRAMINE HYDROCHLORIDE 50 MG/ML
25 INJECTION, SOLUTION INTRAMUSCULAR; INTRAVENOUS ONCE
Status: ACTIVE | OUTPATIENT
Start: 2017-12-05 | End: 2017-12-06

## 2017-12-05 RX ADMIN — Medication 20 ML: at 13:16

## 2017-12-05 RX ADMIN — FERRIC CARBOXYMALTOSE INJECTION 750 MG: 50 INJECTION, SOLUTION INTRAVENOUS at 13:16

## 2017-12-05 RX ADMIN — ACETAMINOPHEN 650 MG: 325 TABLET ORAL at 13:02

## 2017-12-05 NOTE — PROGRESS NOTES
[facility] Progress Note    Date: 2017    Name: Willa Wood    MRN: [id number]         : 1960      Ms. Huynhkins to [facility], ambulatory at [phone]. Pt was assessed and education was provided. Ms. Dhiraj Moffett vitals were reviewed and patient was observed for 5 minutes prior to treatment. Visit Vitals    /72 (BP 1 Location: Left arm, BP Patient Position: Sitting)    Pulse 63    Temp 97.9 °F (36.6 °C)    Resp 18    SpO2 99%       #24 PIV started x 1 attempt in the right hand. Patient tolerated well. Premedications given per order, Tylenol 650mg PO. Pt refused Benadryl today. Injectafer 750 mg IV push over 10 minutes. IV removed from right hand. No irritation or bleeding noted at site. Gauze and coban applied. Ms. Aimee Weinstein tolerated well, and had no complaints. Patient armband removed and shredded. Ms. Aimee Weinstein was discharged from [place] [address] in stable condition at 1330. She has no further appointments at this time.     Eriberto Philippe RN  2017

## 2018-03-02 ENCOUNTER — TELEPHONE (OUTPATIENT)
Dept: FAMILY MEDICINE CLINIC | Age: 58
End: 2018-03-02

## 2018-03-20 ENCOUNTER — OFFICE VISIT (OUTPATIENT)
Dept: FAMILY MEDICINE CLINIC | Age: 58
End: 2018-03-20

## 2018-03-20 VITALS
HEIGHT: 66 IN | TEMPERATURE: 98.3 F | BODY MASS INDEX: 37.03 KG/M2 | HEART RATE: 72 BPM | SYSTOLIC BLOOD PRESSURE: 118 MMHG | WEIGHT: 230.4 LBS | OXYGEN SATURATION: 99 % | DIASTOLIC BLOOD PRESSURE: 76 MMHG | RESPIRATION RATE: 12 BRPM

## 2018-03-20 DIAGNOSIS — R58 ECCHYMOSIS: Primary | ICD-10-CM

## 2018-03-20 DIAGNOSIS — N63.0 BREAST NODULE: ICD-10-CM

## 2018-03-20 PROBLEM — E66.01 SEVERE OBESITY (BMI 35.0-39.9) WITH COMORBIDITY (HCC): Status: ACTIVE | Noted: 2018-03-20

## 2018-03-20 RX ORDER — PANTOPRAZOLE SODIUM 40 MG/1
TABLET, DELAYED RELEASE ORAL
COMMUNITY
Start: 2018-01-05 | End: 2018-04-17 | Stop reason: SDUPTHER

## 2018-03-20 NOTE — PROGRESS NOTES
Patient came into the office today for lump and bruise on left breast x 2 weeks. 1. Have you been to the ER, urgent care clinic since your last visit? Hospitalized since your last visit? No    2. Have you seen or consulted any other health care providers outside of the [address] [place] [address] since your last visit? Include any pap smears or colon screening.  No

## 2018-03-20 NOTE — PROGRESS NOTES
Santosh Alonso is a 59 y.o. female    Discovered spontaneous bruise left breast upon awakening approx 2 weeks ago. Asymptomatic. Resolved with hot compresses and time. Slight thickening at site without aung yeimi mass  No prior hx  No bruising/bleeding elsewhere    BIRADS 1 mmg 11/8/2017    Patient Care Team:  Roselia Miller MD as PCP - General (Family Practice)  Mitchell Torres MD (Dermatology)  Shonna Cartwright MD (Orthopedic Surgery)  Parisa Fox MD as Physician (Gastroenterology)  Allergies   Allergen Reactions    Morphine (Pf) Unknown (comments)     Increases Pain- MAKES PAIN WORST    Pcn [Penicillins] Unknown (comments)     Unknown reaction. Outpatient Prescriptions Marked as Taking for the 3/20/18 encounter (Office Visit) with Roselia Miller MD   Medication Sig Dispense Refill    pantoprazole (PROTONIX) 40 mg tablet       aspirin delayed-release 81 mg tablet Take 1 Tab by mouth daily. 90 Tab 0    rosuvastatin (CRESTOR) 5 mg tablet Take 1 Tab by mouth daily. 90 Tab 5    loratadine (CLARITIN) 10 mg tablet TAKE 1 TABLET DAILY 90 Tab 0    OTHER Tumeric PO      IRON BIS-GLYCINAT/VIT C/FA/B12 (GENTLE IRON PO) Take 27 mg by mouth daily (with breakfast).  cyanocobalamin (VITAMIN B12) 500 mcg tablet Take 500 mcg by mouth daily.  ascorbic acid (VITAMIN C) 500 mg tablet Take  by mouth.  FOLIC ACID/MULTIVITS-MIN (ONE DAILY WOMENS 50 PLUS PO) Take  by mouth. Patient Active Problem List    Diagnosis    Severe obesity (BMI 35.0-39. 9) with comorbidity ([address]    Coronary artery disease involving native heart without angina pectoris     11/1/2016: cardiac cath Dr. Jovany García NMCP, mild single vessel (LAD) disease, medically managed. On file. (Had developed epigastric \"pressure\" while [place] of state 9[place]23/2016. abnl GST). 30-40% calcified stenosis just after the takeoff fo the second diagonal branch. Did not tolerate atorvastatin (myalgias and confusion).  Rosuvastatin initiated 12/7/2016      Hx of peptic ulcer     Notes dated 10/8/10: marginal ulcer NSAID induced. Plan for PPI x 3 months only. Dr. Sai Campbell. 11/15/2017: EGD Dr. Bird Breath. Unremarkable. bx benign. FU with him 8-12 weeks.  Thalassemia trait    Iron deficiency anemia     GI bleed, history of some sort of ulcer, unclear if anastomotic ulcer or long term PPI plan, gastric bypass: iron infusion required 2010, EGD 2010. EGD 2017 benign. Dr. Bird Breath arranging IV iron.  Prediabetes    Hx of gastric bypass    Obesity (BMI 30-39. 9)    Gastroesophageal reflux disease without esophagitis     11/15/2017: EGD Dr. Bird Breath. Unremarkable. bx benign. FU with him 8-12 weeks.         Allergic conjunctivitis and rhinitis     Past Medical History:   Diagnosis Date    Chemical pneumonia ([address] unknown    Gall bladder disease unknown    Gastric bypass status for obesity unknown    Hernia unknown    Knee joint replacement status unknown    Muscle ache unknown    Pendulous abdomen unknown    Rosacea     Thalassemia trait     Ulcer ([address] unknown     Past Surgical History:   Procedure Laterality Date    HX CHOLECYSTECTOMY      HX GASTRIC BYPASS      HX GYN      HX ORTHOPAEDIC      BILATERAL KNEE REPLACEMENT    HX ORTHOPAEDIC Right 08/29/2017    Nick Cobian MD: subacromial decompression , rotator cuff repair, biceps tenotomy     Family History   Problem Relation Age of Onset    Cancer Mother     Other Father 54     \"massive\" thrombosis from leg to heart    Heart Attack Paternal Uncle 61    Heart Attack Paternal Uncle 62    Breast Cancer Neg Hx     Colon Cancer Neg Hx      Social History     Social History    Marital status:      Spouse name: N/A    Number of children: N/A    Years of education: N/A     Occupational History    retired air traffic [employer]     Social History Main Topics    Smoking status: Former Smoker    Smokeless tobacco: Former User     Quit date: 1/1/1986    Alcohol use 3.5 oz/week     7 Glasses of wine per week    Drug use: No    Sexual activity: No      Comment: LMP 2011     Other Topics Concern    Not on file     Social History Narrative         Review of Systems   Constitutional: Negative for fever and unexpected weight change. Genitourinary:        No breast pain/skin changes other than bruise/discharge       Visit Vitals    /76 (BP 1 Location: Right arm, BP Patient Position: Sitting)    Pulse 72    Temp 98.3 °F (36.8 °C) (Oral)    Resp 12    Ht 5' 6\" (1.676 m)    Wt 230 lb 6.4 oz (104.5 kg)    SpO2 99%    BMI 37.19 kg/m2     Physical Exam   Constitutional: She is oriented to person, place, and time. She appears well-developed. No distress. Pleasant obese white female   HENT:   Head: Normocephalic and atraumatic. Pulmonary/Chest: Effort normal.       Lymphadenopathy:     She has no cervical adenopathy. She has no axillary adenopathy. Neurological: She is alert and oriented to person, place, and time. Psychiatric: She has a normal mood and affect. Her behavior is normal. Judgment and thought content normal.         ICD-10-CM ICD-9-CM    1. Ecchymosis R58 459.89    2. Breast nodule N63.0 793.89      Reassuring exam.  Nodule has benign features. Wear supportive bra  Avoid manipulation      The patient understands our medical plan. All questions have been addressed.      Follow-up Disposition:  Return in about 1 month (around 4/20/2018) for reevaluation nodule left breast.

## 2018-03-20 NOTE — MR AVS SNAPSHOT
[address] [address] 
[phone] Patient: Bernard Ceballos MRN: [id number] FAV:[birth year] Visit Information Date & Time Provider Department Dept. Phone Encounter #  
 3/20/2018  2:15 PM Mana Thakkar MD [facility] [phone] [id number] Follow-up Instructions Return in about 1 month (around 4/20/2018) for reevaluation nodule left breast.  
  
Your Appointments 10/29/2018  9:00 AM  
PAP/PELVIC with Mana Thakkar MD  
[facility]) Appt Note: 1 year for well woman  
 [facility] [address] [place] [address] Road  
[phone]  
  
   
 [facility] [address] [place] [address] Upcoming Health Maintenance Date Due FOBT Q 1 YEAR AGE 50-75 12/4/2008 BREAST CANCER SCRN MAMMOGRAM 11/8/2019 PAP AKA CERVICAL CYTOLOGY 10/1/2020 DTaP/Tdap/Td series (2 - Td) 3/6/2024 Allergies as of 3/20/2018  Review Complete On: 3/20/2018 By: Lorrie Chamorro LPN Severity Noted Reaction Type Reactions Morphine (Pf)  06/11/2013    Unknown (comments) Increases Pain- MAKES PAIN WORST Pcn [Penicillins]  06/11/2013    Unknown (comments) Unknown reaction. Current Immunizations  Reviewed on 11/28/2017 Name Date Influenza Vaccine 9/15/2012 Influenza Vaccine (Quad) PF 10/24/2017, 1/31/2017, 10/1/2015 Tdap 3/6/2014 Not reviewed this visit You Were Diagnosed With   
  
 Codes Comments Ecchymosis    -  Primary ICD-10-CM: R58 
ICD-9-CM: 459.89 Breast nodule     ICD-10-CM: N63.0 ICD-9-CM: 793.89 Vitals BP Pulse Temp Resp Height(growth percentile) Weight(growth percentile) 118/76 (BP 1 Location: Right arm, BP Patient Position: Sitting) 72 98.3 °F (36.8 °C) (Oral) 12 5' 6\" (1.676 m) 230 lb 6.4 oz (104.5 kg) SpO2 BMI OB Status Smoking Status 99% 37.19 kg/m2 Menopause Former Smoker BMI and BSA Data Body Mass Index Body Surface Area  
 37.19 kg/m 2 2.21 m 2 Preferred Pharmacy Pharmacy Name Phone [facility] [address] [place] [address], [address] [place] [address] Rd [phone] Your Updated Medication List  
  
   
This list is accurate as of 3/20/18  3:05 PM.  Always use your most recent med list.  
  
  
  
  
 aspirin delayed-release 81 mg tablet Take 1 Tab by mouth daily. cyanocobalamin 500 mcg tablet Commonly known as:  VITAMIN B12 Take 500 mcg by mouth daily. GENTLE IRON PO Take 27 mg by mouth daily (with breakfast). HYDROmorphone 2 mg tablet Commonly known as:  DILAUDID  
  
 loratadine 10 mg tablet Commonly known as:  CLARITIN  
TAKE 1 TABLET DAILY  
  
 ONE DAILY WOMENS 50 PLUS PO Take  by mouth. OTHER Tumeric PO  
  
 pantoprazole 40 mg tablet Commonly known as:  PROTONIX  
  
 rosuvastatin 5 mg tablet Commonly known as:  CRESTOR Take 1 Tab by mouth daily. VITAMIN C 500 mg tablet Generic drug:  ascorbic acid (vitamin C) Take  by mouth. Follow-up Instructions Return in about 1 month (around 4/20/2018) for reevaluation nodule left breast.  
  
  
Introducing [facility]! Dear Desiree Solorzano: Thank you for requesting a [employer] account. Our records indicate that you already have an active [employer] account. You can access your account anytime at [email or web address] Did you know that you can access your hospital and ER discharge instructions at any time in [employer]? You can also review all of your test results from your hospital stay or ER visit. Additional Information If you have questions, please visit the Frequently Asked Questions section of the [employer] website at [email or web address] Remember, [employer] is NOT to be used for urgent needs. For medical emergencies, dial 911. Now available from your iPhone and Android! Please provide this summary of care documentation to your next provider. Your primary care clinician is listed as Veronica Hartley. If you have any questions after today's visit, please call [phone].

## 2018-04-17 ENCOUNTER — OFFICE VISIT (OUTPATIENT)
Dept: FAMILY MEDICINE CLINIC | Age: 58
End: 2018-04-17

## 2018-04-17 VITALS
SYSTOLIC BLOOD PRESSURE: 134 MMHG | OXYGEN SATURATION: 99 % | TEMPERATURE: 98.7 F | DIASTOLIC BLOOD PRESSURE: 74 MMHG | WEIGHT: 226 LBS | BODY MASS INDEX: 36.32 KG/M2 | HEIGHT: 66 IN | HEART RATE: 81 BPM | RESPIRATION RATE: 14 BRPM

## 2018-04-17 DIAGNOSIS — T14.8XXA BRUISING: Primary | ICD-10-CM

## 2018-04-17 DIAGNOSIS — K21.9 GASTROESOPHAGEAL REFLUX DISEASE WITHOUT ESOPHAGITIS: ICD-10-CM

## 2018-04-17 DIAGNOSIS — E66.01 SEVERE OBESITY (BMI 35.0-39.9) WITH COMORBIDITY (HCC): ICD-10-CM

## 2018-04-17 DIAGNOSIS — I25.10 CORONARY ARTERY DISEASE INVOLVING NATIVE HEART WITHOUT ANGINA PECTORIS, UNSPECIFIED VESSEL OR LESION TYPE: ICD-10-CM

## 2018-04-17 RX ORDER — PANTOPRAZOLE SODIUM 40 MG/1
80 TABLET, DELAYED RELEASE ORAL 2 TIMES DAILY
Qty: 360 TAB | Refills: 4 | Status: SHIPPED | OUTPATIENT
Start: 2018-04-17 | End: 2018-11-02 | Stop reason: SDUPTHER

## 2018-04-17 NOTE — MR AVS SNAPSHOT
[address] Vicente [address] [facility] 
[phone] Patient: Vanessa Patel MRN: [id number] LUZ ELENA:[id number] Visit Information Date & Time Provider Department Dept. Phone Encounter #  
 4/17/2018  2:30 PM Taylor Landon MD [facility] [phone] [id number] Follow-up Instructions Return in about 1 year (around 4/17/2019) for GERD, non fasting labs 1 week prior. Your Appointments 10/29/2018  9:00 AM  
PAP/PELVIC with Taylor Landon MD  
[facility] Appt Note: 1 year for well woman  
 [facility] [address] [place] [address] Road  
[phone]  
  
   
 [address] [place] [address] Upcoming Health Maintenance Date Due  
 BREAST CANCER SCRN MAMMOGRAM 11/8/2019 PAP AKA CERVICAL CYTOLOGY 10/1/2020 DTaP/Tdap/Td series (2 - Td) 3/6/2024 COLONOSCOPY 3/31/2028 Allergies as of 4/17/2018  Review Complete On: 4/17/2018 By: Taylor Landon MD  
  
 Severity Noted Reaction Type Reactions Morphine (Pf)  06/11/2013    Unknown (comments) Increases Pain- MAKES PAIN WORST Pcn [Penicillins]  06/11/2013    Unknown (comments) Unknown reaction. Current Immunizations  Reviewed on 11/28/2017 Name Date Influenza Vaccine 9/15/2012 Influenza Vaccine (Quad) PF 10/24/2017, 1/31/2017, 10/1/2015 Tdap 3/6/2014 Not reviewed this visit You Were Diagnosed With   
  
 Codes Comments Gastroesophageal reflux disease without esophagitis    -  Primary ICD-10-CM: K21.9 ICD-9-CM: 530.81 Severe obesity (BMI 35.0-39. 9) with comorbidity ([facility]     ICD-10-CM: E66.01 
ICD-9-CM: 278.01 Bruising     ICD-10-CM: T14Jeremias Polk ICD-9-CM: 924.9 Coronary artery disease involving native heart without angina pectoris, unspecified vessel or lesion type     ICD-10-CM: I25.10 ICD-9-CM: 414.01 Vitals BP Pulse Temp Resp Height(growth percentile) Weight(growth percentile) 134/74 81 98.7 °F (37.1 °C) 14 5' 6\" (1.676 m) 226 lb (102.5 kg) SpO2 BMI OB Status Smoking Status 99% 36.48 kg/m2 Menopause Former Smoker Vitals History BMI and BSA Data Body Mass Index Body Surface Area  
 36.48 kg/m 2 2.18 m 2 Preferred Pharmacy Pharmacy Name Phone [facility]ull[facility], [facility] [phone] Your Updated Medication List  
  
   
This list is accurate as of 4/17/18  3:08 PM.  Always use your most recent med list.  
  
  
  
  
 aspirin delayed-release 81 mg tablet Take 1 Tab by mouth daily. cyanocobalamin 500 mcg tablet Commonly known as:  VITAMIN B12 Take 500 mcg by mouth daily. GENTLE IRON PO Take 27 mg by mouth daily (with breakfast). HYDROmorphone 2 mg tablet Commonly known as:  DILAUDID  
  
 loratadine 10 mg tablet Commonly known as:  CLARITIN  
TAKE 1 TABLET DAILY  
  
 ONE DAILY WOMENS 50 PLUS PO Take  by mouth. OTHER Tumeric PO  
  
 pantoprazole 40 mg tablet Commonly known as:  PROTONIX Take 2 Tabs by mouth two (2) times a day for 90 days. rosuvastatin 5 mg tablet Commonly known as:  CRESTOR Take 1 Tab by mouth daily. VITAMIN C 500 mg tablet Generic drug:  ascorbic acid (vitamin C) Take  by mouth. Prescriptions Sent to Pharmacy Refills  
 pantoprazole (PROTONIX) 40 mg tablet 4 Sig: Take 2 Tabs by mouth two (2) times a day for 90 days. Class: Normal  
 Pharmacy: [facility] , [address] [place] [address] #: [phone] Route: Oral  
  
Follow-up Instructions Return in about 1 year (around 4/17/2019) for GERD, non fasting labs 1 week prior. To-Do List   
 Around 04/17/2019 Lab:  VITAMIN B12 & FOLATE   
  
 04/18/2019   Lab:  CBC WITH AUTOMATED DIFF   
  
 04/18/2019 Lab:  MAGNESIUM   
  
 04/18/2019 Lab:  METABOLIC PANEL, BASIC Introducing hospitals & HEALTH SERVICES! Dear Verónica Tang: Thank you for requesting a [facility] account. Our records indicate that you already have an active [facility] account. You can access your account anytime at [email or web address] (Inspect Manager). [email or web address] (Inspect Manager) Did you know that you can access your hospital and ER discharge instructions at any time in [facility]? You can also review all of your test results from your hospital stay or ER visit. Additional Information If you have questions, please visit the Frequently Asked Questions section of the [facility] website at [email or web address] (Inspect [email or web address]. Remember, [facility] is NOT to be used for urgent needs. For medical emergencies, dial 911. Now available from your iPhone and Android! Please provide this summary of care documentation to your next provider. Your primary care clinician is listed as Amanuel Jaime. If you have any questions after today's visit, please call [phone].

## 2018-04-17 NOTE — PROGRESS NOTES
Joceline Larkin is a 59 y.o. female     Follow up thickening left breast. Had discovered spontaneous bruise left breast upon awakening approx 1 month ago. Steady improvement since then. BIRADS 1 mmg 11/8/2017    Lots of bruising with minor bumps. \"I think I'll ask my cardiologist about stopping the aspirin. \"    GERD:  Increased pantoprazole to 80 mg twice daily in October pending EGD. Works well so long as she doesn't miss a dose. Reassuring EGD 11/2018 on file. normal colo     Follow-up obesity: Increased activity, decreased carbs and portion sizes have resulted in 15 pound weight loss so far this year      Patient Care Team:  Burgess Barbie MD as PCP - General (Family Practice)  Evelyn Malloy MD (Orthopedic Surgery)  Kalia Moore MD as Physician (Gastroenterology)  Allergies   Allergen Reactions    Morphine (Pf) Unknown (comments)     Increases Pain- MAKES PAIN WORST    Pcn [Penicillins] Unknown (comments)     Unknown reaction. Outpatient Prescriptions Marked as Taking for the 4/17/18 encounter (Office Visit) with Burgess Barbie MD   Medication Sig Dispense Refill    pantoprazole (PROTONIX) 40 mg tablet       aspirin delayed-release 81 mg tablet Take 1 Tab by mouth daily. 90 Tab 0    rosuvastatin (CRESTOR) 5 mg tablet Take 1 Tab by mouth daily. 90 Tab 5    loratadine (CLARITIN) 10 mg tablet TAKE 1 TABLET DAILY 90 Tab 0    OTHER Tumeric PO      IRON BIS-GLYCINAT/VIT C/FA/B12 (GENTLE IRON PO) Take 27 mg by mouth daily (with breakfast).  cyanocobalamin (VITAMIN B12) 500 mcg tablet Take 500 mcg by mouth daily.  ascorbic acid (VITAMIN C) 500 mg tablet Take  by mouth.  FOLIC ACID/MULTIVITS-MIN (ONE DAILY WOMENS 50 PLUS PO) Take  by mouth. Patient Active Problem List    Diagnosis    Severe obesity (BMI 35.0-39. 9) with comorbidity ([address]    Coronary artery disease involving native heart without angina pectoris     11/1/2016: cardiac cath Dr. Bernardo Renee NMCP, mild single vessel (LAD) disease, medically managed. On file. (Had developed epigastric \"pressure\" while [place] of state 9[place]23/2016. abnl GST). 30-40% calcified stenosis just after the takeoff fo the second diagonal branch. Did not tolerate atorvastatin (myalgias and confusion). Rosuvastatin initiated 12/7/2016      Hx of peptic ulcer     Notes dated 10/8/10: marginal ulcer NSAID induced. Plan for PPI x 3 months only. Dr. Rupert Kumar. 11/15/2017: EGD Dr. Jonnathan Parrish. Unremarkable. bx benign. FU with him 8-12 weeks.  Thalassemia trait    Iron deficiency anemia     GI bleed, history of some sort of ulcer, unclear if anastomotic ulcer or long term PPI plan, gastric bypass: iron infusion required 2010, EGD 2010. EGD 2017 benign. Dr. Jonnathan Parrish arranging IV iron.  Prediabetes    Hx of gastric bypass    Obesity (BMI 30-39. 9)    Gastroesophageal reflux disease without esophagitis     11/15/2017: EGD Dr. Jonnathan Parrish. Unremarkable. bx benign. FU with him 8-12 weeks.         Allergic conjunctivitis and rhinitis     Past Medical History:   Diagnosis Date    Chemical pneumonia ([address] unknown    Gall bladder disease unknown    Gastric bypass status for obesity unknown    Hernia unknown    Knee joint replacement status unknown    Muscle ache unknown    Pendulous abdomen unknown    Rosacea     Thalassemia trait     Ulcer unknown     Past Surgical History:   Procedure Laterality Date    HX CHOLECYSTECTOMY      HX GASTRIC BYPASS      HX GYN      HX ORTHOPAEDIC      BILATERAL KNEE REPLACEMENT    HX ORTHOPAEDIC Right 08/29/2017    Rockwell Schaumann, MD: subacromial decompression , rotator cuff repair, biceps tenotomy     Family History   Problem Relation Age of Onset    Cancer Mother     Other Father 54     \"massive\" thrombosis from leg to heart    Heart Attack Paternal Uncle 61    Heart Attack Paternal Uncle 62    Breast Cancer Neg Hx     Colon Cancer Neg Hx      Social History     Social History    Marital status:      Spouse name: N/A    Number of children: N/A    Years of education: N/A     Occupational History    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 Genitourinary: Negative for hematuria. No breast pain/skin changes other than bruise/discharge       Visit Vitals    /74    Pulse 81    Temp 98.7 °F (37.1 °C)    Resp 14    Ht 5' 6\" (1.676 m)    Wt 226 lb (102.5 kg)    SpO2 99%    BMI 36.48 kg/m2     Physical Exam   Constitutional: She is oriented to person, place, and time. She appears well-developed. No distress. Pleasant obese white female   HENT:   Head: Normocephalic and atraumatic. Pulmonary/Chest: Effort normal. Left breast exhibits no mass, no nipple discharge and no tenderness. Neurological: She is alert and oriented to person, place, and time. Psychiatric: She has a normal mood and affect. Her behavior is normal. Judgment and thought content normal.       Lab Results   Component Value Date/Time    Vitamin B12 351 10/31/2017 09:41 AM    Folate 16.3 10/31/2017 09:41 AM     Lab Results   Component Value Date/Time    WBC 6.2 10/31/2017 09:41 AM    HGB 10.9 (L) 10/31/2017 09:41 AM    HCT 35.7 10/31/2017 09:41 AM    PLATELET 351 53/67/7511 09:41 AM    MCV 61.8 (L) 10/31/2017 09:41 AM           ICD-10-CM ICD-9-CM    1. Bruising T14. 8XXA 924.9    2. Coronary artery disease involving native heart without angina pectoris, unspecified vessel or lesion type I25.10 414.01    3.  Gastroesophageal reflux disease without esophagitis K21.9 530.81 MAGNESIUM      VITAMIN B12 & FOLATE CBC WITH AUTOMATED DIFF      METABOLIC PANEL, BASIC   4. Severe obesity (BMI 35.0-39. 9) with comorbidity ([facility] E66.01 278.01      Reassuring breast exam.    Encouraged to continue with aspirin as a crucial part of her regimen for her coronary artery disease    GERD: Improved, well-controlled when compliant with twice daily regimen. Continue present management. Discussed risks of long-term PPI therapy    Great job with weight loss so far. Continued weight loss can improve GERD control and potentially facilitate decreasing PPI requirement and reducing those risks    The patient understands our medical plan. All questions have been addressed. Follow-up Disposition:  Return in about 1 year (around 4/17/2019) for GERD, non fasting labs 1 week prior.

## 2018-04-17 NOTE — PROGRESS NOTES
Chief Complaint   Patient presents with    Nodule     on left breast. States \"it's pretty much gone. About the size of a split pea\"     1. Have you been to the ER, urgent care clinic since your last visit? Hospitalized since your last visit? No    2. Have you seen or consulted any other health care providers outside of the [facility] since your last visit? Include any pap smears or colon screening.  updated

## 2018-07-02 ENCOUNTER — OFFICE VISIT (OUTPATIENT)
Dept: FAMILY MEDICINE CLINIC | Age: 58
End: 2018-07-02

## 2018-07-02 VITALS
RESPIRATION RATE: 12 BRPM | HEIGHT: 66 IN | HEART RATE: 58 BPM | DIASTOLIC BLOOD PRESSURE: 70 MMHG | OXYGEN SATURATION: 98 % | WEIGHT: 225.4 LBS | TEMPERATURE: 98.2 F | SYSTOLIC BLOOD PRESSURE: 110 MMHG | BODY MASS INDEX: 36.22 KG/M2

## 2018-07-02 DIAGNOSIS — S39.012A STRAIN OF LUMBAR REGION, INITIAL ENCOUNTER: Primary | ICD-10-CM

## 2018-07-02 RX ORDER — CYCLOBENZAPRINE HCL 5 MG
5-10 TABLET ORAL
Qty: 30 TAB | Refills: 1 | Status: SHIPPED | OUTPATIENT
Start: 2018-07-02 | End: 2019-02-04 | Stop reason: ALTCHOICE

## 2018-07-02 RX ORDER — NAPROXEN 500 MG/1
500 TABLET ORAL 2 TIMES DAILY WITH MEALS
Qty: 60 TAB | Refills: 1 | Status: SHIPPED | OUTPATIENT
Start: 2018-07-02 | End: 2019-02-04 | Stop reason: ALTCHOICE

## 2018-07-02 NOTE — MR AVS SNAPSHOT
Michelle Vicente [address] [place] [address] 
[phone] Patient: Albin Cespedes MRN: [id number] WILLIAN:[id number] Visit Information Date & Time Provider Department Dept. Phone Encounter #  
 7/2/2018 11:00 AM Yumiko Morgan MD [facility] [phone] [id number] Follow-up Instructions Return if symptoms worsen or fail to improve. Your Appointments 10/29/2018  9:00 AM  
PAP/PELVIC with Yumiko Morgan MD  
[facility]) Appt Note: 1 year for well woman  
 [facility] [address] [facility] [id number] [phone]  
  
   
 [address] [address]  
  
    
 4/8/2019  8:50 AM  
LAB with Yumiko Morgan MD  
[facility]) Appt Note: non fasting labs 1 week prior. [facility] [address] [place] [address]  
[phone]  
  
   
 [address] 4/15/2019  9:00 AM  
FOLLOW UP EXAM with Yumiko Morgan MD  
[facility]) Appt Note: 1 year (around 4/17/2019) for GERD, non fasting labs 1 week prior. [facility] [address] [place] [address]  
[phone]  
  
   
 [address] Upcoming Health Maintenance Date Due Influenza Age 5 to Adult 8/1/2018 BREAST CANCER SCRN MAMMOGRAM 11/8/2019 PAP AKA CERVICAL CYTOLOGY 10/1/2020 DTaP/Tdap/Td series (2 - Td) 3/6/2024 COLONOSCOPY 3/31/2028 Allergies as of 7/2/2018  Review Complete On: 7/2/2018 By: Yumiko Morgan MD  
  
 Severity Noted Reaction Type Reactions Morphine (Pf)  06/11/2013    Unknown (comments) Increases Pain- MAKES PAIN WORST Pcn [Penicillins]  06/11/2013    Unknown (comments) Unknown reaction. Current Immunizations  Reviewed on 11/28/2017 Name Date Influenza Vaccine 9/15/2012 Influenza Vaccine (Quad) PF 10/24/2017, 1/31/2017, 10/1/2015 Tdap 3/6/2014 Not reviewed this visit You Were Diagnosed With   
  
 Codes Comments Strain of lumbar region, initial encounter    -  Primary ICD-10-CM: W26.074R ICD-9-CM: 353. 2 Vitals BP Pulse Temp Resp Height(growth percentile) Weight(growth percentile) 110/70 (!) 58 98.2 °F (36.8 °C) (Oral) 12 5' 6\" (1.676 m) 225 lb 6.4 oz (102.2 kg) SpO2 BMI OB Status Smoking Status 98% 36.38 kg/m2 Menopause Former Smoker Vitals History BMI and BSA Data Body Mass Index Body Surface Area  
 36.38 kg/m 2 2.18 m 2 Preferred Pharmacy Pharmacy Name Phone [id number] [address] [phone] Your Updated Medication List  
  
   
This list is accurate as of 7/2/18 11:36 AM.  Always use your most recent med list.  
  
  
  
  
 aspirin delayed-release 81 mg tablet Take 1 Tab by mouth daily. cyanocobalamin 500 mcg tablet Commonly known as:  VITAMIN B12 Take 500 mcg by mouth daily. cyclobenzaprine 5 mg tablet Commonly known as:  FLEXERIL Take 1-2 Tabs by mouth three (3) times daily as needed for Muscle Spasm(s). Indications: Muscle Spasm GENTLE IRON PO Take 27 mg by mouth daily (with breakfast). loratadine 10 mg tablet Commonly known as:  CLARITIN  
TAKE 1 TABLET DAILY  
  
 naproxen 500 mg tablet Commonly known as:  NAPROSYN Take 1 Tab by mouth two (2) times daily (with meals). ONE DAILY WOMENS 50 PLUS PO Take  by mouth. OTHER Tumeric PO  
  
 pantoprazole 40 mg tablet Commonly known as:  PROTONIX Take 2 Tabs by mouth two (2) times a day for 90 days. rosuvastatin 5 mg tablet Commonly known as:  CRESTOR  
 Take 1 Tab by mouth daily. VITAMIN C 500 mg tablet Generic drug:  ascorbic acid (vitamin C) Take  by mouth. Prescriptions Sent to Pharmacy Refills  
 cyclobenzaprine (FLEXERIL) 5 mg tablet 1 Sig: Take 1-2 Tabs by mouth three (3) times daily as needed for Muscle Spasm(s). Indications: Muscle Spasm Class: Normal  
 Pharmacy: [address] [place] [address], [address] [place] [address] Ph #: [phone] Route: Oral  
 naproxen (NAPROSYN) 500 mg tablet 1 Sig: Take 1 Tab by mouth two (2) times daily (with meals). Class: Normal  
 Pharmacy: [address] [place] [address], [address] [place] [address] Ph #: [phone] Route: Oral  
  
Follow-up Instructions Return if symptoms worsen or fail to improve. Patient Instructions Back Stretches: Exercises Your Care Instructions Here are some examples of exercises for stretching your back. Start each exercise slowly. Ease off the exercise if you start to have pain. Your doctor or physical therapist will tell you when you can start these exercises and which ones will work best for you. How to do the exercises Overhead stretch 1. Stand comfortably with your feet shoulder-width apart. 2. Looking straight ahead, raise both arms over your head and reach toward the ceiling. Do not allow your head to tilt back. 3. Hold for 15 to 30 seconds, then lower your arms to your sides. 4. Repeat 2 to 4 times. Side stretch Back Pain: Care Instructions Your Care Instructions Back pain has many possible causes. It is often related to problems with muscles and ligaments of the back. It may also be related to problems with the nerves, discs, or bones of the back. Moving, lifting, standing, sitting, or sleeping in an awkward way can strain the back. Sometimes you don't notice the injury until later. Arthritis is another common cause of back pain. Although it may hurt a lot, back pain usually improves on its own within several weeks. Most people recover in 12 weeks or less. Using good home treatment and being careful not to stress your back can help you feel better sooner. Follow-up care is a key part of your treatment and safety. Be sure to make and go to all appointments, and call your doctor if you are having problems. It's also a good idea to know your test results and keep a list of the medicines you take. How can you care for yourself at home? · Sit or lie in positions that are most comfortable and reduce your pain. Try one of these positions when you lie down: ¨ Lie on your back with your knees bent and supported by large pillows. ¨ Lie on the floor with your legs on the seat of a sofa or chair. Gearldine Barry on your side with your knees and hips bent and a pillow between your legs. ¨ Lie on your stomach if it does not make pain worse. · Do not sit up in bed, and avoid soft couches and twisted positions. Bed rest can help relieve pain at first, but it delays healing. Avoid bed rest after the first day of back pain. · Change positions every 30 minutes. If you must sit for long periods of time, take breaks from sitting. Get up and walk around, or lie in a comfortable position. · Try using a heating pad on a low or medium setting for 15 to 20 minutes every 2 or 3 hours. Try a warm shower in place of one session with the heating pad. · You can also try an ice pack for 10 to 15 minutes every 2 to 3 hours. Put a thin cloth between the ice pack and your skin. · Take pain medicines exactly as directed. ¨ If the doctor gave you a prescription medicine for pain, take it as prescribed. ¨ If you are not taking a prescription pain medicine, ask your doctor if you can take an over-the-counter medicine. · Take short walks several times a day. You can start with 5 to 10 minutes, 3 or 4 times a day, and work up to longer walks.  Walk on level surfaces and avoid hills and stairs until your back is better. · Return to work and other activities as soon as you can. Continued rest without activity is usually not good for your back. · To prevent future back pain, do exercises to stretch and strengthen your back and stomach. Learn how to use good posture, safe lifting techniques, and proper body mechanics. When should you call for help? Call your doctor now or seek immediate medical care if: 
? · You have new or worsening numbness in your legs. ? · You have new or worsening weakness in your legs. (This could make it hard to stand up.) ? · You lose control of your bladder or bowels. ? Watch closely for changes in your health, and be sure to contact your doctor if: 
? · Your pain gets worse. ? · You are not getting better after 2 weeks. Where can you learn more? Go to http://maury-haven.info/. Enter C029 in the search box to learn more about \"Back Pain: Care Instructions. \" Current as of: March 21, 2017 Content Version: 11.4 © 4536-7603 [employer]. Care instructions adapted under license by [employer] (which disclaims liability or warranty for this information). If you have questions about a medical condition or this instruction, always ask your healthcare professional. [address] any warranty or liability for your use of this information. 1. Stand comfortably with your feet shoulder-width apart. 2. Raise one arm over your head, and then lean to the other side. 3. Slide your hand down your leg as you let the weight of your arm gently stretch your side muscles. Hold for 15 to 30 seconds. 4. Repeat 2 to 4 times on each side. Press-up 1. Lie on your stomach, supporting your body with your forearms. 2. Press your elbows down into the floor to raise your upper back.  As you do this, relax your stomach muscles and allow your back to arch without using your back muscles. As your press up, do not let your hips or pelvis come off the floor. 3. Hold for 15 to 30 seconds, then relax. 4. Repeat 2 to 4 times. Relax and rest 
 
1. Lie on your back with a rolled towel under your neck and a pillow under your knees. Extend your arms comfortably to your sides. 2. Relax and breathe normally. 3. Remain in this position for about 10 minutes. 4. If you can, do this 2 or 3 times each day. Follow-up care is a key part of your treatment and safety. Be sure to make and go to all appointments, and call your doctor if you are having problems. It's also a good idea to know your test results and keep a list of the medicines you take. Where can you learn more? Go to http://maury-haven.info/. Enter P047 in the search box to learn more about \"Back Stretches: Exercises. \" Current as of: March 21, 2017 Content Version: 11.4 © 5446-4704 [employer]. Care instructions adapted under license by [employer] (which disclaims liability or warranty for this information). If you have questions about a medical condition or this instruction, always ask your healthcare professional. [place] [address] any warranty or liability for your use of this information. Introducing [facility] & HEALTH SERVICES! Dear Alex French: Thank you for requesting a [employer] account. Our records indicate that you already have an active [employer] account. You can access your account anytime at [email or web address] Did you know that you can access your hospital and ER discharge instructions at any time in [employer]? You can also review all of your test results from your hospital stay or ER visit. Additional Information If you have questions, please visit the Frequently Asked Questions section of the [employer] website at [email or web address] Remember, [employer] is NOT to be used for urgent needs.  For medical emergencies, dial 911. Now available from your iPhone and Android! Please provide this summary of care documentation to your next provider. Your primary care clinician is listed as [place]. If you have any questions after today's visit, please call [phone].

## 2018-07-02 NOTE — PROGRESS NOTES
Jacinda Torres is a 59 y.o. female who was seen in clinic today (7/2/2018). Assessment & Plan:       ICD-10-CM ICD-9-CM    1. Strain of lumbar region, initial encounter S39.012A 847.2 cyclobenzaprine (FLEXERIL) 5 mg tablet      naproxen (NAPROSYN) 500 mg tablet     Rest, ice, stretches      Follow-up Disposition:  Return if symptoms worsen or fail to improve. Subjective:   Jacinda Torres was seen today for Back Pain      sudden onset low back pain, left side with radiation to left hip. Triggered yesterday when bent forward to pick something up. Associated with a tearing sensation at the moment of onset. Constant since then. Improved with ice packs. Intermittent radiation posterior left thigh, not past the knee. Having to change position frequently to minimize discomfort    history of gastric bypass surgery and GERD. Able to tolerate NSAIDs provided maintains PPI therapy. Prior to Admission medications    Medication Sig Start Date End Date Taking? Authorizing Provider   pantoprazole (PROTONIX) 40 mg tablet Take 2 Tabs by mouth two (2) times a day for 90 days. 4/17/18 7/16/18 Yes Lisette Wu MD   aspirin delayed-release 81 mg tablet Take 1 Tab by mouth daily. 11/28/17  Yes Lisette Wu MD   rosuvastatin (CRESTOR) 5 mg tablet Take 1 Tab by mouth daily. 10/24/17  Yes Lisette Wu MD   loratadine (CLARITIN) 10 mg tablet TAKE 1 TABLET DAILY 9/11/17  Yes Lisette Wu MD   OTHER Tumeric PO   Yes Historical Provider   IRON BIS-GLYCINAT/VIT C/FA/B12 (GENTLE IRON PO) Take 27 mg by mouth daily (with breakfast). Yes Historical Provider   cyanocobalamin (VITAMIN B12) 500 mcg tablet Take 500 mcg by mouth daily. Yes Historical Provider   ascorbic acid (VITAMIN C) 500 mg tablet Take  by mouth. Yes Historical Provider   FOLIC ACID/MULTIVITS-MIN (ONE DAILY WOMENS 50 PLUS PO) Take  by mouth.    Yes Historical Provider          Allergies   Allergen Reactions    Morphine (Pf) Unknown (comments)     Increases Pain- MAKES PAIN WORST    Pcn [Penicillins] Unknown (comments)     Unknown reaction. Patient Care Team:  Ha Meade MD as PCP - General (Family Practice)  Radha Killian MD (Orthopedic Surgery)  Jaron García MD as Physician (Gastroenterology)      Review of Systems   Neurological: Negative for focal weakness. No loss of bowel or bladder control           Objective:   Physical Exam   Constitutional: She is oriented to person, place, and time. She appears well-developed. No distress. Pleasant obese white female   HENT:   Head: Normocephalic and atraumatic. Pulmonary/Chest: Effort normal.   Musculoskeletal: She exhibits no edema. Lumbar back: She exhibits tenderness and spasm. Back:    Gait: slow, antalgic  Tender to palpation over spinal processes:  NO  Tender to palpation over SI joints: NO  SLR:  negative   Neurological: She is alert and oriented to person, place, and time. No sensory deficit. Skin: Skin is warm and dry. No rash noted. Psychiatric: She has a normal mood and affect. Her behavior is normal. Judgment and thought content normal.     Visit Vitals    /70    Pulse (!) 58    Temp 98.2 °F (36.8 °C) (Oral)    Resp 12    Ht 5' 6\" (1.676 m)    Wt 225 lb 6.4 oz (102.2 kg)    SpO2 98%    BMI 36.38 kg/m2           Disclaimer: The patient understands our medical plan. Alternatives have been explained and offered. The risks, benefits and significant side effects of all medications have been reviewed. Anticipated time course and progression of condition reviewed. All questions have been addressed. She is encouraged to employ the information provided in the after visit summary, which was reviewed. Where appropriate, she is instructed to call the clinic if she has not been notified either by phone or through [address] with the results of her tests or with an appointment plan for any referrals within 1 week(s).   No news is not good news; it's no news. The patient  is to call if her condition worsens or fails to improve or if significant side effects are experienced. Aspects of this note may have been generated using voice recognition software. Despite editing, there may be unrecognized errors.        Franki Ambrosio MD

## 2018-07-02 NOTE — PATIENT INSTRUCTIONS
Back Stretches: Exercises  Your Care Instructions  Here are some examples of exercises for stretching your back. Start each exercise slowly. Ease off the exercise if you start to have pain. Your doctor or physical therapist will tell you when you can start these exercises and which ones will work best for you. How to do the exercises  Overhead stretch    1. Stand comfortably with your feet shoulder-width apart. 2. Looking straight ahead, raise both arms over your head and reach toward the ceiling. Do not allow your head to tilt back. 3. Hold for 15 to 30 seconds, then lower your arms to your sides. 4. Repeat 2 to 4 times. Side stretch       Back Pain: Care Instructions  Your Care Instructions    Back pain has many possible causes. It is often related to problems with muscles and ligaments of the back. It may also be related to problems with the nerves, discs, or bones of the back. Moving, lifting, standing, sitting, or sleeping in an awkward way can strain the back. Sometimes you don't notice the injury until later. Arthritis is another common cause of back pain. Although it may hurt a lot, back pain usually improves on its own within several weeks. Most people recover in 12 weeks or less. Using good home treatment and being careful not to stress your back can help you feel better sooner. Follow-up care is a key part of your treatment and safety. Be sure to make and go to all appointments, and call your doctor if you are having problems. It's also a good idea to know your test results and keep a list of the medicines you take. How can you care for yourself at home? · Sit or lie in positions that are most comfortable and reduce your pain. Try one of these positions when you lie down:  ¨ Lie on your back with your knees bent and supported by large pillows. ¨ Lie on the floor with your legs on the seat of a sofa or chair.   [place]  on your side with your knees and hips bent and a pillow between your legs.  Shakeel Noriega on your stomach if it does not make pain worse. · Do not sit up in bed, and avoid soft couches and twisted positions. Bed rest can help relieve pain at first, but it delays healing. Avoid bed rest after the first day of back pain. · Change positions every 30 minutes. If you must sit for long periods of time, take breaks from sitting. Get up and walk around, or lie in a comfortable position. · Try using a heating pad on a low or medium setting for 15 to 20 minutes every 2 or 3 hours. Try a warm shower in place of one session with the heating pad. · You can also try an ice pack for 10 to 15 minutes every 2 to 3 hours. Put a thin cloth between the ice pack and your skin. · Take pain medicines exactly as directed. ¨ If the doctor gave you a prescription medicine for pain, take it as prescribed. ¨ If you are not taking a prescription pain medicine, ask your doctor if you can take an over-the-counter medicine. · Take short walks several times a day. You can start with 5 to 10 minutes, 3 or 4 times a day, and work up to longer walks. Walk on level surfaces and avoid hills and stairs until your back is better. · Return to work and other activities as soon as you can. Continued rest without activity is usually not good for your back. · To prevent future back pain, do exercises to stretch and strengthen your back and stomach. Learn how to use good posture, safe lifting techniques, and proper body mechanics. When should you call for help? Call your doctor now or seek immediate medical care if:  ? · You have new or worsening numbness in your legs. ? · You have new or worsening weakness in your legs. (This could make it hard to stand up.)   ? · You lose control of your bladder or bowels. ? Watch closely for changes in your health, and be sure to contact your doctor if:  ? · Your pain gets worse. ? · You are not getting better after 2 weeks. Where can you learn more?   Go to http://mauryGetix.info/. Enter H417 in the search box to learn more about \"Back Pain: Care Instructions. \"  Current as of: March 21, 2017  Content Version: 11.4  © 1232-5822 [employer]. Care instructions adapted under license by [employer] (which disclaims liability or warranty for this information). If you have questions about a medical condition or this instruction, always ask your healthcare professional. [place] [address] any warranty or liability for your use of this information. 1. Stand comfortably with your feet shoulder-width apart. 2. Raise one arm over your head, and then lean to the other side. 3. Slide your hand down your leg as you let the weight of your arm gently stretch your side muscles. Hold for 15 to 30 seconds. 4. Repeat 2 to 4 times on each side. Press-up    1. Lie on your stomach, supporting your body with your forearms. 2. Press your elbows down into the floor to raise your upper back. As you do this, relax your stomach muscles and allow your back to arch without using your back muscles. As your press up, do not let your hips or pelvis come off the floor. 3. Hold for 15 to 30 seconds, then relax. 4. Repeat 2 to 4 times. Relax and rest    1. Lie on your back with a rolled towel under your neck and a pillow under your knees. Extend your arms comfortably to your sides. 2. Relax and breathe normally. 3. Remain in this position for about 10 minutes. 4. If you can, do this 2 or 3 times each day. Follow-up care is a key part of your treatment and safety. Be sure to make and go to all appointments, and call your doctor if you are having problems. It's also a good idea to know your test results and keep a list of the medicines you take. Where can you learn more? Go to http://mauryGetix.info/. Enter G482 in the search box to learn more about \"Back Stretches: Exercises. \"  Current as of: March 21, 2017  Content Version: 11.4  © 3642-0949 [employer]. Care instructions adapted under license by [employer] (which disclaims liability or warranty for this information). If you have questions about a medical condition or this instruction, always ask your healthcare professional. [address] any warranty or liability for your use of this information.

## 2018-07-02 NOTE — PROGRESS NOTES
Patient here for lower back pain x 1 days she was bending over picking up something when she states she felt a tearing sensation and felt instant pain that has been constant. 1. Have you been to the ER, urgent care clinic since your last visit? Hospitalized since your last visit? No  2. Have you seen or consulted any other health care providers outside of the [address] [place] [address] since your last visit? Include any pap smears or colon screening. No  Medication reconciliation has been completed with patient. Care team discussed/updated as well as pharmacy. Care everywhere has been ran.

## 2018-11-02 DIAGNOSIS — K21.9 GASTROESOPHAGEAL REFLUX DISEASE WITHOUT ESOPHAGITIS: Primary | ICD-10-CM

## 2018-11-02 RX ORDER — PANTOPRAZOLE SODIUM 40 MG/1
80 TABLET, DELAYED RELEASE ORAL 2 TIMES DAILY
Qty: 360 TAB | Refills: 2 | Status: SHIPPED | OUTPATIENT
Start: 2018-11-02 | End: 2019-08-05 | Stop reason: SDUPTHER

## 2018-11-02 NOTE — TELEPHONE ENCOUNTER
Patient states she just returned home from vacation and realized she is almost out of her Protonix, she states she will be out by Monday. She was last seen in the office for this on 4/17/18 with an annual follow up plan, a years worth of medication was sent to [facility] at that time but patient is no longer getting this medication from [facility], has appts for labs and follow up scheduled and is asking if she can get enough medication sent to [facility] in [place] to last her until she comes in in April. Medication has been pended please review and sign if appropriate.

## 2018-12-06 DIAGNOSIS — J30.9 ALLERGIC CONJUNCTIVITIS AND RHINITIS, BILATERAL: ICD-10-CM

## 2018-12-06 DIAGNOSIS — I25.10 CORONARY ARTERY DISEASE INVOLVING NATIVE HEART WITHOUT ANGINA PECTORIS, UNSPECIFIED VESSEL OR LESION TYPE: ICD-10-CM

## 2018-12-06 DIAGNOSIS — H10.13 ALLERGIC CONJUNCTIVITIS AND RHINITIS, BILATERAL: ICD-10-CM

## 2018-12-06 RX ORDER — LORATADINE 10 MG/1
TABLET ORAL
Qty: 90 TAB | Refills: 0 | Status: CANCELLED | OUTPATIENT
Start: 2018-12-06

## 2018-12-26 DIAGNOSIS — I25.10 CORONARY ARTERY DISEASE INVOLVING NATIVE HEART WITHOUT ANGINA PECTORIS, UNSPECIFIED VESSEL OR LESION TYPE: Primary | ICD-10-CM

## 2018-12-26 DIAGNOSIS — R73.03 PREDIABETES: ICD-10-CM

## 2018-12-26 NOTE — TELEPHONE ENCOUNTER
Last visit for lipid management 10/2017. Visit needed with non fasting labs prior. Will also reassess pre DM. Please schedule. Interval supply will be authorized. If allergies well controlled, can be addressed then too.  REX

## 2018-12-27 NOTE — TELEPHONE ENCOUNTER
Called patient no answer left message asking her to call the office back, office number left on voicemail.

## 2019-01-07 ENCOUNTER — TELEPHONE (OUTPATIENT)
Dept: FAMILY MEDICINE CLINIC | Age: 59
End: 2019-01-07

## 2019-01-09 NOTE — TELEPHONE ENCOUNTER
Patient called the office back had her verify her . She has been scheduled to be seen on 19 for an appt and will come in on 19 to have her non fasting labs drawn. She thinks she may have enough medication to last her if she does needs refills before her appt it will be on her Claritin only and will call if she starts to get low.

## 2019-01-16 ENCOUNTER — HOSPITAL ENCOUNTER (OUTPATIENT)
Dept: LAB | Age: 59
Discharge: HOME | End: 2019-01-16
Payer: OTHER GOVERNMENT

## 2019-01-16 ENCOUNTER — LAB ONLY (OUTPATIENT)
Dept: FAMILY MEDICINE CLINIC | Age: 59
End: 2019-01-16

## 2019-01-16 DIAGNOSIS — I25.10 CORONARY ARTERY DISEASE INVOLVING NATIVE HEART WITHOUT ANGINA PECTORIS, UNSPECIFIED VESSEL OR LESION TYPE: ICD-10-CM

## 2019-01-16 DIAGNOSIS — R73.03 PREDIABETES: Primary | ICD-10-CM

## 2019-01-16 DIAGNOSIS — R73.03 PREDIABETES: ICD-10-CM

## 2019-01-16 LAB
ANION GAP SERPL CALC-SCNC: 8 MMOL/L (ref 3–18)
BUN SERPL-MCNC: 18 MG/DL (ref 7–18)
BUN/CREAT SERPL: 24 (ref 12–20)
CALCIUM SERPL-MCNC: 9.4 MG/DL (ref 8.5–10.1)
CHLORIDE SERPL-SCNC: 106 MMOL/L (ref 100–108)
CHOLEST SERPL-MCNC: 135 MG/DL
CO2 SERPL-SCNC: 26 MMOL/L (ref 21–32)
CREAT SERPL-MCNC: 0.74 MG/DL (ref 0.6–1.3)
EST. AVERAGE GLUCOSE BLD GHB EST-MCNC: 103 MG/DL
GLUCOSE SERPL-MCNC: 94 MG/DL (ref 74–99)
HBA1C MFR BLD: 5.2 % (ref 4.2–5.6)
HDLC SERPL-MCNC: 53 MG/DL (ref 40–60)
HDLC SERPL: 2.5 {RATIO} (ref 0–5)
LDLC SERPL CALC-MCNC: 56 MG/DL (ref 0–100)
LIPID PROFILE,FLP: NORMAL
POTASSIUM SERPL-SCNC: 4.5 MMOL/L (ref 3.5–5.5)
SODIUM SERPL-SCNC: 140 MMOL/L (ref 136–145)
TRIGL SERPL-MCNC: 130 MG/DL (ref ?–150)
VLDLC SERPL CALC-MCNC: 26 MG/DL

## 2019-01-16 PROCEDURE — 80061 LIPID PANEL: CPT

## 2019-01-16 PROCEDURE — 80048 BASIC METABOLIC PNL TOTAL CA: CPT

## 2019-01-16 PROCEDURE — 83036 HEMOGLOBIN GLYCOSYLATED A1C: CPT

## 2019-02-04 ENCOUNTER — TELEPHONE (OUTPATIENT)
Dept: FAMILY MEDICINE CLINIC | Age: 59
End: 2019-02-04

## 2019-02-04 ENCOUNTER — HOSPITAL ENCOUNTER (OUTPATIENT)
Dept: LAB | Age: 59
Discharge: HOME | End: 2019-02-04
Payer: OTHER GOVERNMENT

## 2019-02-04 ENCOUNTER — OFFICE VISIT (OUTPATIENT)
Dept: FAMILY MEDICINE CLINIC | Age: 59
End: 2019-02-04

## 2019-02-04 VITALS
BODY MASS INDEX: 37.54 KG/M2 | DIASTOLIC BLOOD PRESSURE: 82 MMHG | OXYGEN SATURATION: 98 % | RESPIRATION RATE: 14 BRPM | TEMPERATURE: 97.8 F | SYSTOLIC BLOOD PRESSURE: 118 MMHG | HEART RATE: 61 BPM | WEIGHT: 233.6 LBS | HEIGHT: 66 IN

## 2019-02-04 DIAGNOSIS — E63.9 NUTRITIONAL DEFICIENCY: ICD-10-CM

## 2019-02-04 DIAGNOSIS — R49.9 VOICE COMPLAINT: ICD-10-CM

## 2019-02-04 DIAGNOSIS — Z98.84 HX OF GASTRIC BYPASS: ICD-10-CM

## 2019-02-04 DIAGNOSIS — K13.0 LESION OF LIP: ICD-10-CM

## 2019-02-04 DIAGNOSIS — E66.01 SEVERE OBESITY (BMI 35.0-39.9) WITH COMORBIDITY (HCC): ICD-10-CM

## 2019-02-04 DIAGNOSIS — H10.13 ALLERGIC CONJUNCTIVITIS AND RHINITIS, BILATERAL: ICD-10-CM

## 2019-02-04 DIAGNOSIS — H10.13 ALLERGIC CONJUNCTIVITIS OF BOTH EYES AND RHINITIS: ICD-10-CM

## 2019-02-04 DIAGNOSIS — D50.8 OTHER IRON DEFICIENCY ANEMIA: ICD-10-CM

## 2019-02-04 DIAGNOSIS — J30.9 ALLERGIC CONJUNCTIVITIS OF BOTH EYES AND RHINITIS: ICD-10-CM

## 2019-02-04 DIAGNOSIS — E63.9 NUTRITIONAL DEFICIENCY: Primary | ICD-10-CM

## 2019-02-04 DIAGNOSIS — I25.10 CORONARY ARTERY DISEASE INVOLVING NATIVE HEART WITHOUT ANGINA PECTORIS, UNSPECIFIED VESSEL OR LESION TYPE: Primary | ICD-10-CM

## 2019-02-04 DIAGNOSIS — J30.9 ALLERGIC CONJUNCTIVITIS AND RHINITIS, BILATERAL: ICD-10-CM

## 2019-02-04 DIAGNOSIS — K21.9 GASTROESOPHAGEAL REFLUX DISEASE WITHOUT ESOPHAGITIS: ICD-10-CM

## 2019-02-04 LAB
BASOPHILS # BLD: 0 K/UL (ref 0–0.1)
BASOPHILS NFR BLD: 1 % (ref 0–2)
DIFFERENTIAL METHOD BLD: ABNORMAL
EOSINOPHIL # BLD: 0.2 K/UL (ref 0–0.4)
EOSINOPHIL NFR BLD: 4 % (ref 0–5)
ERYTHROCYTE [DISTWIDTH] IN BLOOD BY AUTOMATED COUNT: 15.4 % (ref 11.6–14.5)
FERRITIN SERPL-MCNC: 78 NG/ML (ref 8–388)
FOLATE SERPL-MCNC: >20 NG/ML (ref 3.1–17.5)
HCT VFR BLD AUTO: 40.6 % (ref 35–45)
HGB BLD-MCNC: 13 G/DL (ref 12–16)
IRON SATN MFR SERPL: 17 %
IRON SERPL-MCNC: 62 UG/DL (ref 50–175)
LYMPHOCYTES # BLD: 1.7 K/UL (ref 0.9–3.6)
LYMPHOCYTES NFR BLD: 30 % (ref 21–52)
MCH RBC QN AUTO: 21.1 PG (ref 24–34)
MCHC RBC AUTO-ENTMCNC: 32 G/DL (ref 31–37)
MCV RBC AUTO: 65.8 FL (ref 74–97)
MONOCYTES # BLD: 0.3 K/UL (ref 0.05–1.2)
MONOCYTES NFR BLD: 5 % (ref 3–10)
NEUTS SEG # BLD: 3.5 K/UL (ref 1.8–8)
NEUTS SEG NFR BLD: 60 % (ref 40–73)
PLATELET # BLD AUTO: 187 K/UL (ref 135–420)
RBC # BLD AUTO: 6.17 M/UL (ref 4.2–5.3)
TIBC SERPL-MCNC: 356 UG/DL (ref 250–450)
VIT B12 SERPL-MCNC: 436 PG/ML (ref 211–911)
WBC # BLD AUTO: 5.7 K/UL (ref 4.6–13.2)

## 2019-02-04 PROCEDURE — 83540 ASSAY OF IRON: CPT

## 2019-02-04 PROCEDURE — 82728 ASSAY OF FERRITIN: CPT

## 2019-02-04 PROCEDURE — 82607 VITAMIN B-12: CPT

## 2019-02-04 PROCEDURE — 85025 COMPLETE CBC W/AUTO DIFF WBC: CPT

## 2019-02-04 RX ORDER — LORATADINE 10 MG/1
TABLET ORAL
Qty: 90 TAB | Refills: 0 | Status: SHIPPED | OUTPATIENT
Start: 2019-02-04 | End: 2019-05-08 | Stop reason: SDUPTHER

## 2019-02-04 NOTE — PROGRESS NOTES
Chief Complaint Patient presents with  Cholesterol Problem Patient here for f/u on her cholesterol and pre dm.  Other She is askig for a referral to ENT for a throat issue and voice change. Asking for refills on her Claritin 1. Have you been to the ER, urgent care clinic since your last visit? Hospitalized since your last visit? No 
2. Have you seen or consulted any other health care providers outside of the [address] [place] [address] since your last visit? Include any pap smears or colon screening. No 
 
Medication reconciliation has been completed with patient. Care team discussed/updated as well as pharmacy. Health Maintenance reviewed - Will discuss need for Shingrix, Flu vaccine not available in clinic.

## 2019-02-04 NOTE — PROGRESS NOTES
Nilda Steele 59 y.o. female had blood work done in right arm Questions / concerns answered No reaction noted, tolerated well

## 2019-02-04 NOTE — PROGRESS NOTES
Julien Juarez is a 59 y.o. female who was seen in clinic today (2/4/2019). Assessment & Plan: ICD-10-CM ICD-9-CM 1. Coronary artery disease involving native heart without angina pectoris, unspecified vessel or lesion type I25.10 414.01   
2. Allergic conjunctivitis and rhinitis, bilateral H10.13 372.05 loratadine (CLARITIN) 10 mg tablet  
 J30.9 477.9 3. Severe obesity (BMI 35.0-39. 9) with comorbidity ([address] E66.01 278.01   
4. Hx of gastric bypass Z98.84 V45.86 FERRITIN  
   CBC WITH AUTOMATED DIFF  
   IRON PROFILE  
   VITAMIN B12 & FOLATE 5. Other iron deficiency anemia D50.8 280.8 FERRITIN  
   CBC WITH AUTOMATED DIFF  
   IRON PROFILE 6. Voice complaint R49.9 784.40 REFERRAL TO ENT-OTOLARYNGOLOGY 7. Gastroesophageal reflux disease without esophagitis K21.9 530.81   
8. Allergic conjunctivitis of both eyes and rhinitis H10.13 372.05   
 J30.9 477.9 9. Lesion of lip K13.0 528.5 REFERRAL TO DERMATOLOGY  
 
CAD: asymptomatic. Continue statin, ASA Prediabetes resolved. Still at risk based on weight. Continue exercise. Weight loss recommended. I suspect vocal changes/mild odynophagia due to suboptimal control of GERD and AR Suspend spicy food, resume tx AR Prefers to proceed with ENT referral rather than wait for response. Schedule ENT eval if no improvement in symptoms over 1-2 months Lip lesion: high risk for skin cancer Post gastric bypass with nutritional deficiencies: status? Planning to have flu and shingles vaccines done at local pharmacy Follow-up Disposition: 
Return in about 6 months (around 8/4/2019) for chronic medical conditions, non fasting labs 1 week prior, subject to change based on results, (30). Subjective:  
Julien Juarez was seen today for Cholesterol Problem (Patient here for f/u on her cholesterol and pre dm. ) and Other (She is askig for a referral to ENT for a throat issue and voice change. Asking for refills on her Claritin) Follow-up prediabetes Follow-up lipids on rosuvastatin in setting of CAD. Asymptomatic Walks everyday Iron deficiency post gastric bypass. Reassuring endoscopies by Dr. Nadya Carroll. Iron injections May-June timeframe. Fatigue improved. No reassessment since \"rubbing\" sensation in her throat since October, initially fleeting, gradually worsened, now constant. Slight odynophagia. In the past week a few friends have commented on voice change GERD: recently noting some mild regurg symptoms. Consumes spicy foods regularly, no change AR:Out of Claritin x 2 weeks Persistent asymptomatic lesion left upper lip since last summer Lab Results Component Value Date/Time Hemoglobin A1c 5.2 01/16/2019 03:15 PM  
 Hemoglobin A1c 5.2 10/24/2017 10:01 AM  
 Hemoglobin A1c 5.7 (H) 06/11/2015 11:12 AM  
 Glucose 94 01/16/2019 03:15 PM  
 LDL, calculated 56 01/16/2019 03:15 PM  
 Creatinine 0.74 01/16/2019 03:15 PM  
 
Lab Results Component Value Date/Time Iron 22 (L) 10/31/2017 09:41 AM  
 TIBC 450 10/31/2017 09:41 AM  
 Iron % saturation 5 10/31/2017 09:41 AM  
 Ferritin 5 (L) 06/11/2015 11:12 AM  
 
Lab Results Component Value Date/Time Vitamin B12 351 10/31/2017 09:41 AM  
 Folate 16.3 10/31/2017 09:41 AM  
 
 
 
Results for orders placed or performed during the hospital encounter of 01/16/19 LIPID PANEL W/ REFLX DIRECT LDL Result Value Ref Range LIPID PROFILE Cholesterol, total 135 <200 MG/DL Triglyceride 130 <150 MG/DL  
 HDL Cholesterol 53 40 - 60 MG/DL  
 LDL, calculated 56 0 - 100 MG/DL VLDL, calculated 26 MG/DL  
 CHOL/HDL Ratio 2.5 0 - 5.0 HEMOGLOBIN A1C WITH EAG Result Value Ref Range Hemoglobin A1c 5.2 4.2 - 5.6 % Est. average glucose 103 mg/dL METABOLIC PANEL, BASIC Result Value Ref Range Sodium 140 136 - 145 mmol/L Potassium 4.5 3.5 - 5.5 mmol/L Chloride 106 100 - 108 mmol/L  
 CO2 26 21 - 32 mmol/L  Anion gap 8 3.0 - 18 mmol/L  
 Glucose 94 74 - 99 mg/dL BUN 18 7.0 - 18 MG/DL Creatinine 0.74 0.6 - 1.3 MG/DL  
 BUN/Creatinine ratio 24 (H) 12 - 20 GFR est AA >60 >60 ml/min/1.73m2 GFR est non-AA >60 >60 ml/min/1.73m2 Calcium 9.4 8.5 - 10.1 MG/DL Outpatient Medications Marked as Taking for the 2/4/19 encounter (Office Visit) with Lucretia Dumont MD  
Medication Sig Dispense Refill  pantoprazole (PROTONIX) 40 mg tablet Take 2 Tabs by mouth two (2) times a day. 360 Tab 2  
 aspirin delayed-release 81 mg tablet Take 1 Tab by mouth daily. 90 Tab 0  
 rosuvastatin (CRESTOR) 5 mg tablet Take 1 Tab by mouth daily. 90 Tab 5  loratadine (CLARITIN) 10 mg tablet TAKE 1 TABLET DAILY 90 Tab 0  
 OTHER Tumeric PO    
 IRON BIS-GLYCINAT/VIT C/FA/B12 (GENTLE IRON PO) Take 27 mg by mouth daily (with breakfast).  cyanocobalamin (VITAMIN B12) 500 mcg tablet Take 500 mcg by mouth daily.  ascorbic acid (VITAMIN C) 500 mg tablet Take  by mouth.  FOLIC ACID/MULTIVITS-MIN (ONE DAILY WOMENS 50 PLUS PO) Take  by mouth. Patient Active Problem List  
 Diagnosis  Severe obesity (BMI 35.0-39. 9) with comorbidity ([address]  Coronary artery disease involving native heart without angina pectoris 11/1/2016: cardiac cath Dr. Anne Lea NMCP, mild single vessel (LAD) disease, medically managed. On file. (Had developed epigastric \"pressure\" while [place] of state 9[place]23/2016. abnl GST). 30-40% calcified stenosis just after the takeoff fo the second diagonal branch. Did not tolerate atorvastatin (myalgias and confusion). Rosuvastatin initiated 12/7/2016  Hx of peptic ulcer Notes dated 10/8/10: marginal ulcer NSAID induced. Plan for PPI x 3 months only. Dr. Lemuel Gustafson. 11/15/2017: EGD Dr. Tru Vo. Unremarkable. bx benign. FU with him 8-12 weeks.  Thalassemia trait  Iron deficiency anemia   GI bleed, history of some sort of ulcer, unclear if anastomotic ulcer or long term PPI plan, gastric bypass: iron infusion required 2010, EGD 2010. EGD 2017 benign. Dr. Aubrey Cool arranging IV iron.  Hx of gastric bypass  Gastroesophageal reflux disease without esophagitis 11/15/2017: EGD Dr. Aubrey Cool. Unremarkable. bx benign. FU with him 8-12 weeks.  Allergic conjunctivitis and rhinitis Allergies Allergen Reactions  Morphine (Pf) Unknown (comments) Increases Pain- MAKES PAIN WORST  Pcn [Penicillins] Unknown (comments) Unknown reaction. Patient Care Team: 
Kina Lainez MD as PCP - [facility]) Gilda Muñzo MD (Orthopedic Surgery) Salina Dias MD as Physician (Gastroenterology) The following sections were reviewed & updated as appropriate: PMH, PSH, FH, and SH. Review of Systems Constitutional: Negative for malaise/fatigue. Respiratory: Negative for shortness of breath. Cardiovascular: Negative for chest pain. Neurological: Negative for sensory change, speech change, focal weakness and headaches. Objective:  
 
Visit Vitals /82 Pulse 61 Temp 97.8 °F (36.6 °C) (Oral) Resp 14 Ht 5' 6\" (1.676 m) Wt 233 lb 9.6 oz (106 kg) SpO2 98% BMI 37.70 kg/m² Physical Exam  
Constitutional: She is oriented to person, place, and time. She appears well-developed. No distress. Pleasant obese white female HENT:  
Head: Normocephalic and atraumatic. Pulmonary/Chest: Effort normal.  
Neurological: She is alert and oriented to person, place, and time. Skin: Lesion (gray white papule vermillion border left upper lip) noted. Psychiatric: She has a normal mood and affect. Her behavior is normal. Judgment and thought content normal.  
 
 
 
Disclaimer: The patient understands our medical plan. Alternatives have been explained and offered. The risks, benefits and significant side effects of all medications have been reviewed.  Anticipated time course and progression of condition reviewed. All questions have been addressed. She is encouraged to employ the information provided in the after visit summary, which was reviewed. Where appropriate, she is instructed to call the clinic if she has not been notified either by phone or through [address] with the results of her tests or with an appointment plan for any referrals within 1 week(s). No news is not good news; it's no news. The patient  is to call if her condition worsens or fails to improve or if significant side effects are experienced. Aspects of this note may have been generated using voice recognition software. Despite editing, there may be unrecognized errors.   
 
 
Bi Murray MD

## 2019-02-04 NOTE — Clinical Note
Increased risk for osteoporosis due to her gastric bypass. I had ordered a bone density study last year. I do not see a result. Please call to determine whether she had this done outside of our system. If it has not yet been done I will place a new order.  REX

## 2019-02-04 NOTE — TELEPHONE ENCOUNTER
Spoke with Killian [place] 1960 (confirmed) on 2019  Informed her that she is at Increased risk for osteoporosis due to her gastric bypass. I asked is she had her bone density study last year. She stated no she had not and would really appreciate Dr. Jacqui Cerda putting in another order. The patient expressed clear understanding and did not have any questions.     SMD

## 2019-02-06 NOTE — TELEPHONE ENCOUNTER
Spoke with Marelyn Skiff DOB 1960 (confirmed) on 2019  Informed her that Dr. Harman Guan has reordered the bone density scan and provided her with central scheduling's # [phone]    The patient expressed clear understanding and did not have any questions.     SMD

## 2019-03-21 RX ORDER — ROSUVASTATIN CALCIUM 5 MG/1
TABLET, COATED ORAL
Qty: 90 TAB | Refills: 5 | OUTPATIENT
Start: 2019-03-21

## 2019-05-08 DIAGNOSIS — J30.9 ALLERGIC CONJUNCTIVITIS AND RHINITIS, BILATERAL: ICD-10-CM

## 2019-05-08 DIAGNOSIS — I25.10 CORONARY ARTERY DISEASE INVOLVING NATIVE HEART WITHOUT ANGINA PECTORIS, UNSPECIFIED VESSEL OR LESION TYPE: ICD-10-CM

## 2019-05-08 DIAGNOSIS — H10.13 ALLERGIC CONJUNCTIVITIS AND RHINITIS, BILATERAL: ICD-10-CM

## 2019-05-08 NOTE — TELEPHONE ENCOUNTER
Patient is asking for refills on her Crestor and Loratadine. She was last seen in the office on 2/4/19 with 6 month f/u plan, she does have her appointment scheduled on 8/5/19 and will be getting labs done at [facility]. She is asking if she can have enough medication sent to [facility] to last until she is seen on the office, medications have been pended please review and sign if appropriate.

## 2019-05-09 RX ORDER — LORATADINE 10 MG/1
TABLET ORAL
Qty: 90 TAB | Refills: 0 | Status: SHIPPED | OUTPATIENT
Start: 2019-05-09 | End: 2019-08-05 | Stop reason: SDUPTHER

## 2019-05-09 RX ORDER — ROSUVASTATIN CALCIUM 5 MG/1
5 TABLET, COATED ORAL DAILY
Qty: 90 TAB | Refills: 0 | Status: SHIPPED | OUTPATIENT
Start: 2019-05-09 | End: 2019-08-05 | Stop reason: SDUPTHER

## 2019-07-29 ENCOUNTER — HOSPITAL ENCOUNTER (OUTPATIENT)
Dept: LAB | Age: 59
Discharge: HOME | End: 2019-07-29
Payer: OTHER GOVERNMENT

## 2019-07-29 ENCOUNTER — LAB ONLY (OUTPATIENT)
Dept: FAMILY MEDICINE CLINIC | Age: 59
End: 2019-07-29

## 2019-07-29 DIAGNOSIS — K21.9 GASTROESOPHAGEAL REFLUX DISEASE WITHOUT ESOPHAGITIS: ICD-10-CM

## 2019-07-29 DIAGNOSIS — E63.9 NUTRITIONAL DEFICIENCY: ICD-10-CM

## 2019-07-29 DIAGNOSIS — I25.10 CORONARY ARTERY DISEASE INVOLVING NATIVE HEART WITHOUT ANGINA PECTORIS, UNSPECIFIED VESSEL OR LESION TYPE: Primary | ICD-10-CM

## 2019-07-29 DIAGNOSIS — D50.8 OTHER IRON DEFICIENCY ANEMIA: ICD-10-CM

## 2019-07-29 DIAGNOSIS — Z98.84 HX OF GASTRIC BYPASS: ICD-10-CM

## 2019-07-29 DIAGNOSIS — R73.03 PREDIABETES: ICD-10-CM

## 2019-07-29 LAB
ALBUMIN SERPL-MCNC: 3.6 G/DL (ref 3.4–5)
ALBUMIN/GLOB SERPL: 1.2 {RATIO} (ref 0.8–1.7)
ALP SERPL-CCNC: 95 U/L (ref 45–117)
ALT SERPL-CCNC: 31 U/L (ref 13–56)
ANION GAP SERPL CALC-SCNC: 6 MMOL/L (ref 3–18)
AST SERPL-CCNC: 18 U/L (ref 10–38)
BASOPHILS # BLD: 0 K/UL (ref 0–0.1)
BASOPHILS NFR BLD: 0 % (ref 0–2)
BILIRUB SERPL-MCNC: 0.7 MG/DL (ref 0.2–1)
BUN SERPL-MCNC: 17 MG/DL (ref 7–18)
BUN/CREAT SERPL: 23 (ref 12–20)
CALCIUM SERPL-MCNC: 8.9 MG/DL (ref 8.5–10.1)
CALCIUM SERPL-MCNC: 9.5 MG/DL (ref 8.5–10.1)
CHLORIDE SERPL-SCNC: 110 MMOL/L (ref 100–111)
CO2 SERPL-SCNC: 28 MMOL/L (ref 21–32)
CREAT SERPL-MCNC: 0.75 MG/DL (ref 0.6–1.3)
DIFFERENTIAL METHOD BLD: ABNORMAL
EOSINOPHIL # BLD: 0.2 K/UL (ref 0–0.4)
EOSINOPHIL NFR BLD: 3 % (ref 0–5)
ERYTHROCYTE [DISTWIDTH] IN BLOOD BY AUTOMATED COUNT: 15.6 % (ref 11.6–14.5)
FERRITIN SERPL-MCNC: 39 NG/ML (ref 8–388)
FOLATE SERPL-MCNC: 16.1 NG/ML (ref 3.1–17.5)
GLOBULIN SER CALC-MCNC: 3 G/DL (ref 2–4)
GLUCOSE SERPL-MCNC: 79 MG/DL (ref 74–99)
HCT VFR BLD AUTO: 37.4 % (ref 35–45)
HGB BLD-MCNC: 11.8 G/DL (ref 12–16)
IRON SATN MFR SERPL: 14 %
IRON SERPL-MCNC: 46 UG/DL (ref 50–175)
LYMPHOCYTES # BLD: 1.8 K/UL (ref 0.9–3.6)
LYMPHOCYTES NFR BLD: 28 % (ref 21–52)
MCH RBC QN AUTO: 20.5 PG (ref 24–34)
MCHC RBC AUTO-ENTMCNC: 31.6 G/DL (ref 31–37)
MCV RBC AUTO: 64.9 FL (ref 74–97)
MONOCYTES # BLD: 0.3 K/UL (ref 0.05–1.2)
MONOCYTES NFR BLD: 5 % (ref 3–10)
NEUTS SEG # BLD: 4.1 K/UL (ref 1.8–8)
NEUTS SEG NFR BLD: 64 % (ref 40–73)
PLATELET # BLD AUTO: 188 K/UL (ref 135–420)
POTASSIUM SERPL-SCNC: 4.5 MMOL/L (ref 3.5–5.5)
PROT SERPL-MCNC: 6.6 G/DL (ref 6.4–8.2)
PTH-INTACT SERPL-MCNC: 69.7 PG/ML (ref 18.4–88)
RBC # BLD AUTO: 5.76 M/UL (ref 4.2–5.3)
SODIUM SERPL-SCNC: 144 MMOL/L (ref 136–145)
TIBC SERPL-MCNC: 339 UG/DL (ref 250–450)
VIT B12 SERPL-MCNC: 324 PG/ML (ref 211–911)
WBC # BLD AUTO: 6.4 K/UL (ref 4.6–13.2)

## 2019-07-29 PROCEDURE — 84425 ASSAY OF VITAMIN B-1: CPT

## 2019-07-29 PROCEDURE — 83970 ASSAY OF PARATHORMONE: CPT

## 2019-07-29 PROCEDURE — 83540 ASSAY OF IRON: CPT

## 2019-07-29 PROCEDURE — 82607 VITAMIN B-12: CPT

## 2019-07-29 PROCEDURE — 82306 VITAMIN D 25 HYDROXY: CPT

## 2019-07-29 PROCEDURE — 82728 ASSAY OF FERRITIN: CPT

## 2019-07-29 PROCEDURE — 85025 COMPLETE CBC W/AUTO DIFF WBC: CPT

## 2019-07-29 PROCEDURE — 80053 COMPREHEN METABOLIC PANEL: CPT

## 2019-07-30 LAB — 25(OH)D3 SERPL-MCNC: 25.9 NG/ML (ref 30–100)

## 2019-07-31 LAB — VIT B1 BLD-SCNC: 109 NMOL/L (ref 66.5–200)

## 2019-08-05 ENCOUNTER — OFFICE VISIT (OUTPATIENT)
Dept: FAMILY MEDICINE CLINIC | Age: 59
End: 2019-08-05

## 2019-08-05 VITALS
SYSTOLIC BLOOD PRESSURE: 116 MMHG | BODY MASS INDEX: 37.16 KG/M2 | DIASTOLIC BLOOD PRESSURE: 78 MMHG | RESPIRATION RATE: 14 BRPM | WEIGHT: 230.2 LBS | TEMPERATURE: 98.1 F | OXYGEN SATURATION: 98 % | HEART RATE: 62 BPM

## 2019-08-05 DIAGNOSIS — Z98.84 HX OF GASTRIC BYPASS: ICD-10-CM

## 2019-08-05 DIAGNOSIS — I25.10 CORONARY ARTERY DISEASE INVOLVING NATIVE HEART WITHOUT ANGINA PECTORIS, UNSPECIFIED VESSEL OR LESION TYPE: Primary | ICD-10-CM

## 2019-08-05 DIAGNOSIS — H10.13 ALLERGIC CONJUNCTIVITIS AND RHINITIS, BILATERAL: ICD-10-CM

## 2019-08-05 DIAGNOSIS — E63.9 NUTRITIONAL DEFICIENCY: ICD-10-CM

## 2019-08-05 DIAGNOSIS — J30.9 ALLERGIC CONJUNCTIVITIS AND RHINITIS, BILATERAL: ICD-10-CM

## 2019-08-05 DIAGNOSIS — D50.8 OTHER IRON DEFICIENCY ANEMIA: ICD-10-CM

## 2019-08-05 DIAGNOSIS — K21.9 GASTROESOPHAGEAL REFLUX DISEASE WITHOUT ESOPHAGITIS: ICD-10-CM

## 2019-08-05 DIAGNOSIS — D56.3 THALASSEMIA TRAIT: ICD-10-CM

## 2019-08-05 DIAGNOSIS — E66.01 SEVERE OBESITY (BMI 35.0-39.9) WITH COMORBIDITY (HCC): ICD-10-CM

## 2019-08-05 RX ORDER — ROSUVASTATIN CALCIUM 10 MG/1
10 TABLET, COATED ORAL DAILY
Qty: 90 TAB | Refills: 4 | Status: SHIPPED | OUTPATIENT
Start: 2019-08-05 | End: 2020-11-17 | Stop reason: SDUPTHER

## 2019-08-05 RX ORDER — PANTOPRAZOLE SODIUM 40 MG/1
80 TABLET, DELAYED RELEASE ORAL 2 TIMES DAILY
Qty: 360 TAB | Refills: 4 | Status: SHIPPED | OUTPATIENT
Start: 2019-08-05 | End: 2020-11-17 | Stop reason: SDUPTHER

## 2019-08-05 RX ORDER — LORATADINE 10 MG/1
TABLET ORAL
Qty: 90 TAB | Refills: 4 | Status: SHIPPED | OUTPATIENT
Start: 2019-08-05 | End: 2020-11-17 | Stop reason: SDUPTHER

## 2019-08-05 NOTE — PATIENT INSTRUCTIONS
Please contact my office/[facility] message the details of your Bariatric Iron therapy. Increase the dose to 3 times daily if tolerated. Vitamin D: Most people do not have muscle or joint aches when the value is greater than 20. I recommend  2000 international units daily for you, gelcap form preferred. I recommend 2-3 months of therapy before reassessing your bloodwork and your symptoms.

## 2019-08-05 NOTE — PROGRESS NOTES
Patient here for her follow up on her chronic medical conditions and lab results. She has concerns about her iron levels being low and would like a referral back to Dr. Anabel Luna. 1. Have you been to the ER, urgent care clinic since your last visit? Hospitalized since your last visit? No  2. Have you seen or consulted any other health care providers outside of the [address] [place] [address] since your last visit? Include any pap smears or colon screening. No    Medication reconciliation has been completed with patient. Care team discussed/updated as well as pharmacy.     Health Maintenance Due   Topic Date Due    Shingrix Vaccine Age 49> (1 of 2) 12/04/2008    Influenza Age 5 to Adult  08/01/2019    BREAST CANCER SCRN MAMMOGRAM  11/08/2019

## 2019-08-05 NOTE — PROGRESS NOTES
Gunjan Juarez is a 59 y.o. female who was seen in clinic today (8/5/2019). Assessment & Plan:       ICD-10-CM ICD-9-CM    1. Coronary artery disease involving native heart without angina pectoris, unspecified vessel or lesion type I25.10 414.01 rosuvastatin (CRESTOR) 10 mg tablet   2. Other iron deficiency anemia D50.8 280.8 FERRITIN      CBC WITH AUTOMATED DIFF      IRON PROFILE   3. Nutritional deficiency E63.9 269.9 VITAMIN D, 25 HYDROXY   4. Hx of gastric bypass J76.89 T52.27 METABOLIC PANEL, COMPREHENSIVE      VITAMIN D, 25 HYDROXY   5. Thalassemia trait D56.3 282.46    6. Gastroesophageal reflux disease without esophagitis K21.9 530.81 pantoprazole (PROTONIX) 40 mg tablet   7. Severe obesity (BMI 35.0-39. 9) with comorbidity ([address] E66.01 278.01    8. Allergic conjunctivitis and rhinitis, bilateral H10.13 372.05 loratadine (CLARITIN) 10 mg tablet    J30.9 477.9      CAD: asymptomatic. Continue ASA. Increase statin dose. Prediabetes resolved. Still at risk based on weight. Continue exercise. Weight loss recommended. Post gastric bypass with nutritional deficiencies:   Patient Instructions   Please contact my office/Mychart message the details of your Bariatric Iron therapy. Increase the dose to 3 times daily if tolerated. Vitamin D: Most people do not have muscle or joint aches when the value is greater than 20. I recommend  2000 international units daily for you, gelcap form preferred. I recommend 2-3 months of therapy before reassessing your bloodwork and your symptoms. B12: Well controlled, continue present management    GERD: Well controlled, continue present management  AR: Well controlled, continue present management      Follow-up and Dispositions    · Return in about 3 months (around 11/5/2019) for iron deficiency and vit D deficiency follow up, non fasting labs 1 week prior.                  Subjective:   Gunjan Juarez was seen today for Follow Up Chronic Condition      Follow-up prediabetes  Follow-up CAD. Asymptomatic    Walks everyday: 4246-3150 steps/day without symptoms. Iron deficiency post gastric bypass. Reassuring endoscopies by Dr. Paige Payne. Iron injections May-June 2018 timeframe. Fatigue improved. No infusions since then. Continued once daily bariatric iron formuation. Notes return of fatigue and some leg cramps x approx 1 month      GERD: asymptomatic  AR: asymptomatic    FU persistent asymptomatic lesion left upper lip. Saw derm. Benign path    Lab Results   Component Value Date/Time    Hemoglobin A1c 5.2 01/16/2019 03:15 PM    Hemoglobin A1c 5.2 10/24/2017 10:01 AM    Hemoglobin A1c 5.7 (H) 06/11/2015 11:12 AM    Glucose 79 07/29/2019 01:32 PM    LDL, calculated 56 01/16/2019 03:15 PM    Creatinine 0.75 07/29/2019 01:32 PM     Lab Results   Component Value Date/Time    Iron 46 (L) 07/29/2019 01:32 PM    TIBC 339 07/29/2019 01:32 PM    Iron % saturation 14 07/29/2019 01:32 PM    Ferritin 39 07/29/2019 01:32 PM     Lab Results   Component Value Date/Time    Vitamin B12 324 07/29/2019 01:32 PM    Folate 16.1 07/29/2019 01:32 PM         Results for orders placed or performed during the hospital encounter of 85/76/62   METABOLIC PANEL, COMPREHENSIVE   Result Value Ref Range    Sodium 144 136 - 145 mmol/L    Potassium 4.5 3.5 - 5.5 mmol/L    Chloride 110 100 - 111 mmol/L    CO2 28 21 - 32 mmol/L    Anion gap 6 3.0 - 18 mmol/L    Glucose 79 74 - 99 mg/dL    BUN 17 7.0 - 18 MG/DL    Creatinine 0.75 0.6 - 1.3 MG/DL    BUN/Creatinine ratio 23 (H) 12 - 20      GFR est AA >60 >60 ml/min/1.73m2    GFR est non-AA >60 >60 ml/min/1.73m2    Calcium 8.9 8.5 - 10.1 MG/DL    Bilirubin, total 0.7 0.2 - 1.0 MG/DL    ALT (SGPT) 31 13 - 56 U/L    AST (SGOT) 18 10 - 38 U/L    Alk.  phosphatase 95 45 - 117 U/L    Protein, total 6.6 6.4 - 8.2 g/dL    Albumin 3.6 3.4 - 5.0 g/dL    Globulin 3.0 2.0 - 4.0 g/dL    A-G Ratio 1.2 0.8 - 1.7     CBC WITH AUTOMATED DIFF   Result Value Ref Range    WBC 6.4 4.6 - 13.2 K/uL    RBC 5.76 (H) 4.20 - 5.30 M/uL    HGB 11.8 (L) 12.0 - 16.0 g/dL    HCT 37.4 35.0 - 45.0 %    MCV 64.9 (L) 74.0 - 97.0 FL    MCH 20.5 (L) 24.0 - 34.0 PG    MCHC 31.6 31.0 - 37.0 g/dL    RDW 15.6 (H) 11.6 - 14.5 %    PLATELET 636 788 - 751 K/uL    NEUTROPHILS 64 40 - 73 %    LYMPHOCYTES 28 21 - 52 %    MONOCYTES 5 3 - 10 %    EOSINOPHILS 3 0 - 5 %    BASOPHILS 0 0 - 2 %    ABS. NEUTROPHILS 4.1 1.8 - 8.0 K/UL    ABS. LYMPHOCYTES 1.8 0.9 - 3.6 K/UL    ABS. MONOCYTES 0.3 0.05 - 1.2 K/UL    ABS. EOSINOPHILS 0.2 0.0 - 0.4 K/UL    ABS. BASOPHILS 0.0 0.0 - 0.1 K/UL    DF AUTOMATED     FERRITIN   Result Value Ref Range    Ferritin 39 8 - 388 NG/ML   IRON PROFILE   Result Value Ref Range    Iron 46 (L) 50 - 175 ug/dL    TIBC 339 250 - 450 ug/dL    Iron % saturation 14 %   VITAMIN B12 & FOLATE   Result Value Ref Range    Vitamin B12 324 211 - 911 pg/mL    Folate 16.1 3.10 - 17.50 ng/mL   PTH INTACT   Result Value Ref Range    Calcium 9.5 8.5 - 10.1 MG/DL    PTH, Intact 69.7 18.4 - 88.0 pg/mL   VITAMIN B1, WHOLE BLOOD   Result Value Ref Range    Vitamin B1 109.0 66.5 - 200.0 nmol/L   VITAMIN D, 25 HYDROXY   Result Value Ref Range    Vitamin D 25-Hydroxy 25.9 (L) 30 - 100 ng/mL      Outpatient Medications Marked as Taking for the 8/5/19 encounter (Office Visit) with Shelton Celis MD   Medication Sig Dispense Refill    rosuvastatin (CRESTOR) 5 mg tablet Take 1 Tab by mouth daily. 90 Tab 0    loratadine (CLARITIN) 10 mg tablet TAKE 1 TABLET DAILY 90 Tab 0    pantoprazole (PROTONIX) 40 mg tablet Take 2 Tabs by mouth two (2) times a day. 360 Tab 2    aspirin delayed-release 81 mg tablet Take 1 Tab by mouth daily. 90 Tab 0    OTHER Tumeric PO      cyanocobalamin (VITAMIN B12) 500 mcg tablet Take 500 mcg by mouth daily.  ascorbic acid (VITAMIN C) 500 mg tablet Take  by mouth.  FOLIC ACID/MULTIVITS-MIN (ONE DAILY WOMENS 50 PLUS PO) Take  by mouth.              Patient Active Problem List    Diagnosis    Nutritional deficiency     Due to gastric bypass      Severe obesity (BMI 35.0-39. 9) with comorbidity ([address]    Coronary artery disease involving native heart without angina pectoris     11/1/2016: cardiac cath Dr. Bruce Jaffe NMCP, mild single vessel (LAD) disease, medically managed. On file. (Had developed epigastric \"pressure\" while [place] of state 9[place]23[place]2016. abnl GST). 30-40% calcified stenosis just after the takeoff fo the second diagonal branch. Did not tolerate atorvastatin (myalgias and confusion). Rosuvastatin initiated 12/7/2016      Hx of peptic ulcer     Notes dated 10/8/10: marginal ulcer NSAID induced. Plan for PPI x 3 months only. Dr. Shannan Garcia. 11/15/2017: EGD Dr. Anabel Luna. Unremarkable. bx benign. FU with him 8-12 weeks.  Thalassemia trait    Iron deficiency anemia     GI bleed, history of some sort of ulcer, unclear if anastomotic ulcer or long term PPI plan, gastric bypass: iron infusion required 2010, EGD 2010. EGD 2017 benign. Dr. Anabel Luna arranging IV iron.  Hx of gastric bypass    Gastroesophageal reflux disease without esophagitis     11/15/2017: EGD Dr. Anabel Luna. Unremarkable. bx benign. FU with him 8-12 weeks.  Allergic conjunctivitis and rhinitis         Allergies   Allergen Reactions    Morphine (Pf) Unknown (comments)     Increases Pain- MAKES PAIN WORST    Pcn [Penicillins] Unknown (comments)     Unknown reaction. Patient Care Team:  Max Davila MD as PCP - General (Family Practice)  Estefania Spears MD (Orthopedic Surgery)  Humphrey Larios MD as Physician (Gastroenterology)    The following sections were reviewed & updated as appropriate: PMH, PSH, FH, and SH. Review of Systems   Constitutional: Negative for malaise/fatigue. HENT: Negative for sore throat. Respiratory: Negative for shortness of breath. Cardiovascular: Negative for chest pain.    Gastrointestinal: Negative for abdominal pain, blood in stool, heartburn and melena. Objective:     Visit Vitals  /78   Pulse 62   Temp 98.1 °F (36.7 °C) (Oral)   Resp 14   Wt 230 lb 3.2 oz (104.4 kg)   SpO2 98%   BMI 37.16 kg/m²      Physical Exam   Constitutional: She is oriented to person, place, and time. She appears well-developed. No distress. Pleasant obese white female   HENT:   Head: Normocephalic and atraumatic. Pulmonary/Chest: Effort normal.   Neurological: She is alert and oriented to person, place, and time. Psychiatric: She has a normal mood and affect. Her behavior is normal. Judgment and thought content normal.         Disclaimer: The patient understands our medical plan. Alternatives have been explained and offered. The risks, benefits and significant side effects of all medications have been reviewed. Anticipated time course and progression of condition reviewed. All questions have been addressed. She is encouraged to employ the information provided in the after visit summary, which was reviewed. Where appropriate, she is instructed to call the clinic if she has not been notified either by phone or through [address] with the results of her tests or with an appointment plan for any referrals within 1 week(s). No news is not good news; it's no news. The patient  is to call if her condition worsens or fails to improve or if significant side effects are experienced. Aspects of this note may have been generated using voice recognition software. Despite editing, there may be unrecognized errors.        Trenton Ascencio MD

## 2019-12-19 ENCOUNTER — HOSPITAL ENCOUNTER (OUTPATIENT)
Dept: GENERAL RADIOLOGY | Age: 59
Discharge: HOME | End: 2019-12-19
Payer: OTHER GOVERNMENT

## 2019-12-19 ENCOUNTER — OFFICE VISIT (OUTPATIENT)
Dept: FAMILY MEDICINE CLINIC | Age: 59
End: 2019-12-19

## 2019-12-19 VITALS
DIASTOLIC BLOOD PRESSURE: 68 MMHG | RESPIRATION RATE: 14 BRPM | OXYGEN SATURATION: 98 % | TEMPERATURE: 98 F | BODY MASS INDEX: 37.77 KG/M2 | WEIGHT: 235 LBS | HEIGHT: 66 IN | HEART RATE: 58 BPM | SYSTOLIC BLOOD PRESSURE: 124 MMHG

## 2019-12-19 DIAGNOSIS — S80.01XA CONTUSION OF RIGHT KNEE, INITIAL ENCOUNTER: Primary | ICD-10-CM

## 2019-12-19 DIAGNOSIS — Z96.653 HISTORY OF BILATERAL KNEE REPLACEMENT: ICD-10-CM

## 2019-12-19 DIAGNOSIS — S80.01XA CONTUSION OF RIGHT KNEE, INITIAL ENCOUNTER: ICD-10-CM

## 2019-12-19 DIAGNOSIS — Z98.84 HX OF GASTRIC BYPASS: ICD-10-CM

## 2019-12-19 PROCEDURE — 73564 X-RAY EXAM KNEE 4 OR MORE: CPT

## 2019-12-19 RX ORDER — OXYCODONE AND ACETAMINOPHEN 5; 325 MG/1; MG/1
1-2 TABLET ORAL
Qty: 30 TAB | Refills: 0 | Status: SHIPPED | OUTPATIENT
Start: 2019-12-19 | End: 2019-12-26

## 2019-12-19 NOTE — PROGRESS NOTES
Patient here to be seen for injury to her right knee. She states she injured her knee last Monday, she was walking when her left foot slipped out from under her causing her to fall on her right knee onto concrete. She has been having pain and swelling every since she says the swelling has gotten better but her pain seems to be getting worse. She has been taking OTC extra strength tylenol that has not helped. Patient has had her right knee replaced in the past and has concerns she may have damaged the repair. 1. Have you been to the ER, urgent care clinic since your last visit? Hospitalized since your last visit? No  2. Have you seen or consulted any other health care providers outside of the [address] [place] [address] since your last visit? Include any pap smears or colon screening. No    Medication reconciliation has been completed with patient. Care team discussed/updated as well as pharmacy.     Health Maintenance Due   Topic Date Due    Shingrix Vaccine Age 49> (1 of 2) 12/04/2008    Influenza Age 5 to Adult  08/01/2019    BREAST CANCER SCRN MAMMOGRAM  11/08/2019

## 2019-12-19 NOTE — PROGRESS NOTES
Airam Ayoub is a 62 y.o. female who was seen in clinic today (12/19/2019). Assessment & Plan:   Diagnoses and all orders for this visit:    1. Contusion of right knee, initial encounter  -     XR KNEE RT MIN 4 V; Future  -     oxyCODONE-acetaminophen (PERCOCET) 5-325 mg per tablet; Take 1-2 Tabs by mouth every four (4) hours as needed for Pain for up to 7 days. Max Daily Amount: 12 Tabs. 2. History of bilateral knee replacement    3. Hx of gastric bypass      Doubt hardware disruption, but will verify    NSAIDs contraindicated by gastric bypass hx    Modalities  patience      Follow-up and Dispositions    · Return if symptoms worsen or fail to improve. Subjective:   Airam Ayoub was seen today for Knee Injury; Knee Pain; and Knee Swelling    Right anterior knee pain and swelling:injured 12/9/2019 while walking. Left foot slipped on wet tile causing her to fall on her right knee onto concrete. Pain and swelling ever since. The swelling has improved, but her pain is progressively worse. No relief with OTC extra strength tylenol or icing regularly. Post right knee replacement approx [id number] at [place], concerned she may have damaged the repair. Prior to Admission medications    Medication Sig Start Date End Date Taking? Authorizing Provider   loratadine (CLARITIN) 10 mg tablet TAKE 1 TABLET DAILY 8/5/19  Yes Jassi Robertson MD   rosuvastatin (CRESTOR) 10 mg tablet Take 1 Tab by mouth daily. 8/5/19  Yes Jassi Robertson MD   pantoprazole (PROTONIX) 40 mg tablet Take 2 Tabs by mouth two (2) times a day. 8/5/19  Yes Jassi Robertson MD   aspirin delayed-release 81 mg tablet Take 1 Tab by mouth daily. 11/28/17  Yes Jassi Robertson MD   OTHER Tumeric PO   Yes Provider, Historical   IRON BIS-GLYCINAT/VIT C/FA/B12 (GENTLE IRON PO) Take 27 mg by mouth daily (with breakfast).    Yes Provider, Historical   cyanocobalamin (VITAMIN B12) 500 mcg tablet Take 500 mcg by mouth daily. Yes Provider, Historical   ascorbic acid (VITAMIN C) 500 mg tablet Take  by mouth. Yes Provider, Historical   FOLIC ACID/MULTIVITS-MIN (ONE DAILY WOMENS 50 PLUS PO) Take  by mouth. Yes Provider, Historical         Patient Active Problem List    Diagnosis    Nutritional deficiency     Due to gastric bypass      Severe obesity (BMI 35.0-39. 9) with comorbidity ([facility]    Coronary artery disease involving native heart without angina pectoris     11/1/2016: cardiac cath Dr. Craig Fields NMCP, mild single vessel (LAD) disease, medically managed. On file. (Had developed epigastric \"pressure\" while out of state 9/23/2016. abnl GST). 30-40% calcified stenosis just after the takeoff fo the second diagonal branch. Did not tolerate atorvastatin (myalgias and confusion). Rosuvastatin initiated 12/7/2016      Hx of peptic ulcer     Notes dated 10/8/10: marginal ulcer NSAID induced. Plan for PPI x 3 months only. Dr. Shana Sanabria. 11/15/2017: EGD Dr. Myrtis Castleman. Unremarkable. bx benign. FU with him 8-12 weeks.  Thalassemia trait    Iron deficiency anemia     GI bleed, history of some sort of ulcer, unclear if anastomotic ulcer or long term PPI plan, gastric bypass: iron infusion required 2010, EGD 2010. EGD 2017 benign. Dr. Myrtis Castleman arranging IV iron.  Hx of gastric bypass    Gastroesophageal reflux disease without esophagitis     11/15/2017: EGD Dr. Myrtis Castleman. Unremarkable. bx benign. FU with him 8-12 weeks.  Allergic conjunctivitis and rhinitis         Allergies   Allergen Reactions    Morphine (Pf) Unknown (comments)     Increases Pain- MAKES PAIN WORST    Pcn [Penicillins] Unknown (comments)     Unknown reaction. Social History     Tobacco Use    Smoking status: Former Smoker    Smokeless tobacco: Former User     Quit date: 1/1/1986   Substance Use Topics    Alcohol use:  Yes     Alcohol/week: 5.8 standard drinks     Types: 7 Glasses of wine per week       Patient Care Team:  Maurisoi Childers MD ARELY as PCP - General (Family Practice)  Ximena Mcghee MD as PCP - [facility]  Ash Mathur MD (Orthopedic Surgery)  Fortunato Gonzales MD as Physician (Gastroenterology)    The following sections were reviewed & updated as appropriate: PMH, PSH, FH, and SH. Review of Systems   Constitutional: Negative for fever. Musculoskeletal:        No instability           Objective:     Visit Vitals  /68   Pulse (!) 58   Temp 98 °F (36.7 °C) (Oral)   Resp 14   Ht 5' 6\" (1.676 m)   Wt 235 lb (106.6 kg)   SpO2 98%   BMI 37.93 kg/m²      Physical Exam  Constitutional:       General: She is not in acute distress. Appearance: She is well-developed. HENT:      Head: Normocephalic and atraumatic. Pulmonary:      Effort: Pulmonary effort is normal.   Musculoskeletal:      Right knee: She exhibits effusion. She exhibits normal range of motion. Tenderness found. Medial joint line tenderness: diffuse anterior and bilateral.      Comments: Extensive ecchymosis with distal tracking   Neurological:      Mental Status: She is alert and oriented to person, place, and time. Gait: Gait abnormal (antalgic). Psychiatric:         Behavior: Behavior normal.         Thought Content: Thought content normal.         Judgment: Judgment normal.           Disclaimer: The patient understands our medical plan. Alternatives have been explained and offered. The risks, benefits and significant side effects of all medications have been reviewed. Anticipated time course and progression of condition reviewed. All questions have been addressed. She is encouraged to employ the information provided in the after visit summary, which was reviewed. Where applicable, she is instructed to call the clinic if she has not been notified either by phone or through [address] with the results of her tests or with an appointment plan for any referrals within 1 week(s). No news is not good news; it's no news.  The patient  is to call if her condition worsens or fails to improve or if significant side effects are experienced. Aspects of this note may have been generated using voice recognition software. Despite editing, there may be unrecognized errors.        Rosy Warren MD

## 2020-08-20 ENCOUNTER — TELEPHONE (OUTPATIENT)
Dept: FAMILY MEDICINE CLINIC | Age: 60
End: 2020-08-20

## 2020-08-20 DIAGNOSIS — S06.0X0A CONCUSSION WITHOUT LOSS OF CONSCIOUSNESS, INITIAL ENCOUNTER: Primary | ICD-10-CM

## 2020-08-20 NOTE — TELEPHONE ENCOUNTER
Patient called and states she fell backwards in the middle of June and hit her head. Then on 4th of July she dropped something in her kitchen and hit hear head in same spot on granite countertop on her way back up from leaning over. It was the right top side of her head both times. Then starting this past Friday/Saturday she started getting dizzy spells where she has to grab a counter or wall to balance, she states it is not your normal dizzy spells. Denies any vision problems other than while she is dizzy her vision is blurred which lasts about 5 minutes and resolves after she rests and drinks something. She had one yesterday where she almost went backwards but leaned forward real quick as to not pass out backwards. Her  seems to think they happen more in the afternoon. She states she is normally good about fluid intake, she states she on most days has  ounces but admits it can vary. Patient states she is going to be very consistent with sticking to 60 ounces to see if that makes a difference.

## 2020-08-21 NOTE — TELEPHONE ENCOUNTER
Patient is aware that CT was ordered by Dr. Benjy Holloway in Dr. Andreina vann. She was given central scheduling's # to call to schedule if she doesn't hear fairly quickly from them.   Tita Cartwright

## 2020-08-31 ENCOUNTER — HOSPITAL ENCOUNTER (OUTPATIENT)
Dept: CT IMAGING | Age: 60
Discharge: HOME | End: 2020-08-31
Attending: FAMILY MEDICINE
Payer: OTHER GOVERNMENT

## 2020-08-31 DIAGNOSIS — S06.0X0A CONCUSSION WITHOUT LOSS OF CONSCIOUSNESS, INITIAL ENCOUNTER: ICD-10-CM

## 2020-08-31 PROCEDURE — 70450 CT HEAD/BRAIN W/O DYE: CPT

## 2020-09-01 ENCOUNTER — TELEPHONE (OUTPATIENT)
Dept: FAMILY MEDICINE CLINIC | Age: 60
End: 2020-09-01

## 2020-09-04 ENCOUNTER — VIRTUAL VISIT (OUTPATIENT)
Dept: FAMILY MEDICINE CLINIC | Age: 60
End: 2020-09-04

## 2020-09-04 DIAGNOSIS — E63.9 NUTRITIONAL DEFICIENCY: ICD-10-CM

## 2020-09-04 DIAGNOSIS — D50.8 OTHER IRON DEFICIENCY ANEMIA: ICD-10-CM

## 2020-09-04 DIAGNOSIS — Z98.84 HX OF GASTRIC BYPASS: ICD-10-CM

## 2020-09-04 DIAGNOSIS — R42 VERTIGO: Primary | ICD-10-CM

## 2020-09-04 DIAGNOSIS — D56.3 THALASSEMIA TRAIT: ICD-10-CM

## 2020-09-04 DIAGNOSIS — I25.10 CORONARY ARTERY DISEASE INVOLVING NATIVE HEART WITHOUT ANGINA PECTORIS, UNSPECIFIED VESSEL OR LESION TYPE: ICD-10-CM

## 2020-09-04 NOTE — Clinical Note
about 2 weeks vertigo follow up, non fasting labs, BP and flu shot 1 week prior Governor Nori, note that she'll need well woman with pap in October. Might as well look ahead to scheduling that as well. )

## 2020-09-04 NOTE — PROGRESS NOTES
Qi Hilaroi is a 62 y.o. female who was seen by synchronous (real-time) audio-video technology on 9/4/2020 for Results and Dizziness        Assessment & Plan:   Diagnoses and all orders for this visit:    1. Vertigo    2. Coronary artery disease involving native heart without angina pectoris, unspecified vessel or lesion type  -     METABOLIC PANEL, COMPREHENSIVE; Future  -     LIPID PANEL W/ REFLX DIRECT LDL; Future    3. Other iron deficiency anemia  -     CBC WITH AUTOMATED DIFF; Future  -     FERRITIN; Future  -     IRON PROFILE; Future    4. Nutritional deficiency  -     METABOLIC PANEL, COMPREHENSIVE; Future  -     VITAMIN B12 & FOLATE; Future  -     VITAMIN D, 25 HYDROXY; Future    5. Thalassemia trait    6. Hx of gastric bypass  -     METABOLIC PANEL, COMPREHENSIVE; Future  -     CBC WITH AUTOMATED DIFF; Future  -     FERRITIN; Future  -     IRON PROFILE; Future  -     VITAMIN B12 & FOLATE; Future  -     PTH INTACT; Future  -     VITAMIN D, 25 HYDROXY; Future      Timeline and complete absence of amnesia or any other symptoms, argue against TBI as etiology. No symptoms to support acute labyrinthitis, ETD. Really not consistent with with BPPV either. Short acting nature argues against efficacy of medications. Will pursue tx as though BPPV. Link to video sent. Obtain labs for which she is due anyway, fix anything that needs fixing and reassess. Careful symptom diary. Continue to abstain from riding motorcycle and from driving. Follow-up and Dispositions    · Return in about 2 weeks (around 9/18/2020) for vertigo follow up, non fasting labs, BP and flu shot 1 week prior. I spent at least 55 minutes on this visit with this established patient. 712  Subjective:   complains of \"dizziness\"  Onset approx 8/15, progressively worsening. Almost daily. Last up to 5 minutes each time. \"You feel like your footing is not where it should be. Everything is off balance. \" \"Like someone pulled the rug out from under me. \" Usually occurs while walking, as short as from one room to another, usually farther. No warning. Not triggered by turning head or rolling over in bed. 1 week ago developed lethargy after dinner, has happened 3/5 nights. Onset during dinner. \"I feel like someone put lead weight in my blood. I want to just drop. \" Lasts approx 30 minutes. No change with decreasing meal sizes. No bowel changes  No new/excess processed carbs    Blunt head trauma in May, fell backwards, hitting head on the wall on the way down \"I'd had too much to drink\". No LOC but stayed down because \"it rang my bell\" got up 20 minutes later, and went to bed. Wicked goose egg on the back of my head. Felt otherwise fine the next morning. No headache or other symptoms. July 4th whacked posterior head on granite countertop \"the same spot\" rising from picking up an object from the floor    No LOC or other symptoms. Went to [place] on a bike trip afterwards x 17 days. Felt fine. 1 week later started with the dizzy spells, approx 8/15 (5 weeks post second head injury)    Normal appetite  No amnesia  No sinus pressure/congestion  No ear fullness or ringing, no loss of hearing  No rashes  No changes in mood  No light/sound sensitivity  No change concentration  No chest pain or shortness of breath. Not driving. Passenger today. Noted mild double vision until \"right on top of objects\" street signs a little blurry. None since has been home, even when looking into the distance    Knitting without difficulty    CT Results (most recent):  Results from Hospital Encounter encounter on 08/31/20   CT HEAD WO CONT    Narrative EXAM:  CT Head without Contrast              CLINICAL INDICATION:  Concussion without loss of consciousness. Hit head twice  3 months ago.   Started having dizziness 3 weeks ago    COMPARISON:  04/17/14           TECHNIQUE:      - Helical volumetric CT imaging of the head is performed from the base of the  skull to the vertex without IV contrast administration. Axial, coronal and  sagittal reconstruction images are generated from this volumetric data set. - Dose optimization techniques are utilized as appropriate to the performed exam  with combination of automated exposure control, adjustment of mA and/or kV  according to patient size, and use of iterative reconstructive technique. FINDINGS:     Brain:    - Hemorrhage/ hematoma:  No evidence of intracranial hemorrhage or hematoma is  detected. - Mass:  No definite space-occupying lesion is apparent. No significant mass  effect such as midline shift or sulcal effacement. - Infarct:  No convincing evidence of acute infarct is noted. - Gray-white matter differentiation:  Intact differentiation pattern throughout. CSF spaces:  Ventricles appear unremarkable. Sulci and cisterns appear  unremarkable. Calvarium:  Intact. Sinuses:  Clear. Interval assessment:  No significant interval changes are observed. Impression IMPRESSION:                1.  No acute intracranial process. 2.  No significant interval change. Prior to Admission medications    Medication Sig Start Date End Date Taking? Authorizing Provider   loratadine (CLARITIN) 10 mg tablet TAKE 1 TABLET DAILY 8/5/19  Yes Sherren Flattery, MD   rosuvastatin (CRESTOR) 10 mg tablet Take 1 Tab by mouth daily. 8/5/19  Yes Sherren Flattery, MD   pantoprazole (PROTONIX) 40 mg tablet Take 2 Tabs by mouth two (2) times a day. 8/5/19  Yes Sherren Flattery, MD   aspirin delayed-release 81 mg tablet Take 1 Tab by mouth daily. 11/28/17  Yes Sherren Flattery, MD   OTHER Tumeric PO   Yes Provider, Historical   IRON BIS-GLYCINAT/VIT C/FA/B12 (GENTLE IRON PO) Take 27 mg by mouth daily (with breakfast). Yes Provider, Historical   cyanocobalamin (VITAMIN B12) 500 mcg tablet Take 500 mcg by mouth daily.    Yes Provider, Historical   ascorbic acid (VITAMIN C) 500 mg tablet Take  by mouth. Yes Provider, Historical   FOLIC ACID/MULTIVITS-MIN (ONE DAILY WOMENS 50 PLUS PO) Take  by mouth. Yes Provider, Historical         ROS per hpi      [INSTRUCTIONS:  \"[x]\" Indicates a positive item  \"[]\" Indicates a negative item  -- DELETE ALL ITEMS NOT EXAMINED]    Constitutional: [x] Appears well-developed and well-nourished [x] No apparent distress      [] Abnormal -     Mental status: [x] Alert and awake  [x] Oriented to person/place/time [x] Able to follow commands    [] Abnormal -     Eyes:   EOM    [x]  Normal    [] Abnormal -   Sclera  [x]  Normal    [] Abnormal -          Discharge [x]  None visible   [] Abnormal -     HENT: [x] Normocephalic, atraumatic  [] Abnormal -   [x] Mouth/Throat: Mucous membranes are moist    External Ears [x] Normal  [] Abnormal -    Neck: [x] No visualized mass [] Abnormal -     Pulmonary/Chest: [x] Respiratory effort normal   [x] No visualized signs of difficulty breathing or respiratory distress        [] Abnormal -      Musculoskeletal:   [x] Normal gait with no signs of ataxia         [x] Normal range of motion of neck - which did not trigger vertigo        [] Abnormal -     Neurological:        [x] No Facial Asymmetry (Cranial nerve 7 motor function) (limited exam due to video visit)          [x] No gaze palsy        [] Abnormal -          Skin:        [x] No significant exanthematous lesions or discoloration noted on facial skin         [] Abnormal -            Psychiatric:       [x] Normal Affect [] Abnormal -        [x] No Hallucinations    Other pertinent observable physical exam findings:-        We discussed the expected course, resolution and complications of the diagnosis(es) in detail. Medication risks, benefits, costs, interactions, and alternatives were discussed as indicated. I advised her to contact the office if her condition worsens, changes or fails to improve as anticipated.  She expressed understanding with the diagnosis(es) and plan. Champ Lopez, who was evaluated through a patient-initiated, synchronous (real-time) audio-video encounter, and/or her healthcare decision maker, is aware that it is a billable service, with coverage as determined by her insurance carrier. She provided verbal consent to proceed: Yes, and patient identification was verified. It was conducted pursuant to the emergency declaration under the [address] [place] [address] and the [employer] General Act. A caregiver was present when appropriate. Ability to conduct physical exam was limited. I was at home. The patient was at home.       Tara Johnson MD

## 2020-09-04 NOTE — PROGRESS NOTES
Patient being seen for CT results she says she is still having dizziness and is concerned. 1. Have you been to the ER, urgent care clinic since your last visit? Hospitalized since your last visit? No  2. Have you seen or consulted any other health care providers outside of the [address] [place] [address] since your last visit? Include any pap smears or colon screening. No    Medication reconciliation has been completed with patient. Care team discussed/updated as well as pharmacy.     Health Maintenance Due   Topic Date Due    Shingrix Vaccine Age 49> (1 of 2) 12/04/2008    Breast Cancer Screen Mammogram  11/08/2019    Lipid Screen  01/16/2020    Flu Vaccine (1) 09/01/2020    PAP AKA CERVICAL CYTOLOGY  10/01/2020

## 2020-09-04 NOTE — PROGRESS NOTES
Nicole Vo is a 62 y.o. female who was seen by synchronous (real-time) audio-video technology on 9/4/2020 for Results and Dizziness Assessment & Plan:  
{A/P PLUS DISPO [facility]:66900} {time statement optional (Optional):03715} Subjective:  
 
 
Prior to Admission medications Medication Sig Start Date End Date Taking? Authorizing Provider  
loratadine (CLARITIN) 10 mg tablet TAKE 1 TABLET DAILY 8/5/19  Yes Jeremy Georges MD  
rosuvastatin (CRESTOR) 10 mg tablet Take 1 Tab by mouth daily. 8/5/19  Yes Jeremy Georges MD  
pantoprazole (PROTONIX) 40 mg tablet Take 2 Tabs by mouth two (2) times a day. 8/5/19  Yes Jeremy Georges MD  
aspirin delayed-release 81 mg tablet Take 1 Tab by mouth daily. 11/28/17  Yes Jeremy Georges MD  
OTHER Tumeric PO   Yes Provider, Historical  
IRON BIS-GLYCINAT/VIT C/FA/B12 (GENTLE IRON PO) Take 27 mg by mouth daily (with breakfast). Yes Provider, Historical  
cyanocobalamin (VITAMIN B12) 500 mcg tablet Take 500 mcg by mouth daily. Yes Provider, Historical  
ascorbic acid (VITAMIN C) 500 mg tablet Take  by mouth. Yes Provider, Historical  
FOLIC ACID/MULTIVITS-MIN (ONE DAILY WOMENS 50 PLUS PO) Take  by mouth. Yes Provider, Historical  
 
{History SmartLink choices - disappears if left unselected (Optional):57372} ROS Objective: No flowsheet data found. [INSTRUCTIONS:  \"[x]\" Indicates a positive item  \"[]\" Indicates a negative item  -- DELETE ALL ITEMS NOT EXAMINED] Constitutional: [x] Appears well-developed and well-nourished [x] No apparent distress   
  [] Abnormal - Mental status: [x] Alert and awake  [x] Oriented to person/place/time [x] Able to follow commands   
[] Abnormal - Eyes:   EOM    [x]  Normal    [] Abnormal -  
Sclera  [x]  Normal    [] Abnormal - 
        Discharge [x]  None visible   [] Abnormal - HENT: [x] Normocephalic, atraumatic  [] Abnormal -  
[x] Mouth/Throat: Mucous membranes are moist 
 
 External Ears [x] Normal  [] Abnormal - Neck: [x] No visualized mass [] Abnormal - Pulmonary/Chest: [x] Respiratory effort normal   [x] No visualized signs of difficulty breathing or respiratory distress 
      [] Abnormal - Musculoskeletal:   [x] Normal gait with no signs of ataxia [x] Normal range of motion of neck [] Abnormal -  
 
Neurological:        [x] No Facial Asymmetry (Cranial nerve 7 motor function) (limited exam due to video visit) [x] No gaze palsy  
     [] Abnormal -   
     
Skin:        [x] No significant exanthematous lesions or discoloration noted on facial skin   
     [] Abnormal - Psychiatric:       [x] Normal Affect [] Abnormal -  
     [x] No Hallucinations Other pertinent observable physical exam findings:- 
 
 
 
We discussed the expected course, resolution and complications of the diagnosis(es) in detail. Medication risks, benefits, costs, interactions, and alternatives were discussed as indicated. I advised her to contact the office if her condition worsens, changes or fails to improve as anticipated. She expressed understanding with the diagnosis(es) and plan. Mason Andrade, who was evaluated through a patient-initiated, synchronous (real-time) audio-video encounter, and/or her healthcare decision maker, is aware that it is a billable service, with coverage as determined by her insurance carrier. She provided verbal consent to proceed: {YES/NO/NA-Consent obtained within past 12 months:45424::\"Yes\"}, and patient identification was verified. It was conducted pursuant to the emergency declaration under the [address] [place] [address] and the [employer] and [employer] General Act. A caregiver was present when appropriate. Ability to conduct physical exam was limited. I was {location home office other:13470::\"at home\"}.  The patient was {location home office other:24084::\"at home\"}.  
 
 
Fidel Saenz MD

## 2020-09-04 NOTE — PATIENT INSTRUCTIONS
Follow this link to a video explaining the maneuvers for the \"Half Somersault\" maneuver to relieve the vertigo. I'm not sure which side will work. Try one side, perhaps a couple of times over a couple of days. Then if no change, try the other. It'll make sense when you watch it.  
 
https://youtu. be/[email or web address]

## 2020-09-09 ENCOUNTER — HOSPITAL ENCOUNTER (OUTPATIENT)
Dept: LAB | Age: 60
Discharge: HOME | End: 2020-09-09
Payer: OTHER GOVERNMENT

## 2020-09-09 ENCOUNTER — TELEPHONE (OUTPATIENT)
Dept: FAMILY MEDICINE CLINIC | Age: 60
End: 2020-09-09

## 2020-09-09 ENCOUNTER — CLINICAL SUPPORT (OUTPATIENT)
Dept: FAMILY MEDICINE CLINIC | Age: 60
End: 2020-09-09

## 2020-09-09 VITALS — SYSTOLIC BLOOD PRESSURE: 122 MMHG | TEMPERATURE: 98.6 F | DIASTOLIC BLOOD PRESSURE: 68 MMHG

## 2020-09-09 DIAGNOSIS — E63.9 NUTRITIONAL DEFICIENCY: ICD-10-CM

## 2020-09-09 DIAGNOSIS — I25.10 CORONARY ARTERY DISEASE INVOLVING NATIVE HEART WITHOUT ANGINA PECTORIS, UNSPECIFIED VESSEL OR LESION TYPE: ICD-10-CM

## 2020-09-09 DIAGNOSIS — D50.8 OTHER IRON DEFICIENCY ANEMIA: ICD-10-CM

## 2020-09-09 DIAGNOSIS — R42 VERTIGO: ICD-10-CM

## 2020-09-09 DIAGNOSIS — Z98.84 HX OF GASTRIC BYPASS: ICD-10-CM

## 2020-09-09 DIAGNOSIS — I25.10 CORONARY ARTERY DISEASE INVOLVING NATIVE HEART WITHOUT ANGINA PECTORIS, UNSPECIFIED VESSEL OR LESION TYPE: Primary | ICD-10-CM

## 2020-09-09 LAB
25(OH)D3 SERPL-MCNC: 29.7 NG/ML (ref 30–100)
ALBUMIN SERPL-MCNC: 3.4 G/DL (ref 3.4–5)
ALBUMIN/GLOB SERPL: 1.2 {RATIO} (ref 0.8–1.7)
ALP SERPL-CCNC: 89 U/L (ref 45–117)
ALT SERPL-CCNC: 34 U/L (ref 13–56)
ANION GAP SERPL CALC-SCNC: 4 MMOL/L (ref 3–18)
AST SERPL-CCNC: 19 U/L (ref 10–38)
BASOPHILS # BLD: 0 K/UL (ref 0–0.1)
BASOPHILS NFR BLD: 0 % (ref 0–2)
BILIRUB SERPL-MCNC: 0.4 MG/DL (ref 0.2–1)
BUN SERPL-MCNC: 22 MG/DL (ref 7–18)
BUN/CREAT SERPL: 29 (ref 12–20)
CALCIUM SERPL-MCNC: 9.1 MG/DL (ref 8.5–10.1)
CALCIUM SERPL-MCNC: 9.2 MG/DL (ref 8.5–10.1)
CHLORIDE SERPL-SCNC: 109 MMOL/L (ref 100–111)
CHOLEST SERPL-MCNC: 107 MG/DL
CO2 SERPL-SCNC: 30 MMOL/L (ref 21–32)
CREAT SERPL-MCNC: 0.75 MG/DL (ref 0.6–1.3)
DIFFERENTIAL METHOD BLD: ABNORMAL
EOSINOPHIL # BLD: 0.2 K/UL (ref 0–0.4)
EOSINOPHIL NFR BLD: 3 % (ref 0–5)
ERYTHROCYTE [DISTWIDTH] IN BLOOD BY AUTOMATED COUNT: 15.3 % (ref 11.6–14.5)
FERRITIN SERPL-MCNC: 26 NG/ML (ref 8–388)
FOLATE SERPL-MCNC: 16.9 NG/ML (ref 3.1–17.5)
GLOBULIN SER CALC-MCNC: 2.8 G/DL (ref 2–4)
GLUCOSE SERPL-MCNC: 77 MG/DL (ref 74–99)
HCT VFR BLD AUTO: 36.9 % (ref 35–45)
HDLC SERPL-MCNC: 48 MG/DL (ref 40–60)
HDLC SERPL: 2.2 {RATIO} (ref 0–5)
HGB BLD-MCNC: 11.9 G/DL (ref 12–16)
IRON SATN MFR SERPL: 13 % (ref 20–50)
IRON SERPL-MCNC: 49 UG/DL (ref 50–175)
LDLC SERPL CALC-MCNC: 42 MG/DL (ref 0–100)
LIPID PROFILE,FLP: NORMAL
LYMPHOCYTES # BLD: 1.8 K/UL (ref 0.9–3.6)
LYMPHOCYTES NFR BLD: 26 % (ref 21–52)
MCH RBC QN AUTO: 21 PG (ref 24–34)
MCHC RBC AUTO-ENTMCNC: 32.2 G/DL (ref 31–37)
MCV RBC AUTO: 65.1 FL (ref 74–97)
MONOCYTES # BLD: 0.4 K/UL (ref 0.05–1.2)
MONOCYTES NFR BLD: 5 % (ref 3–10)
NEUTS SEG # BLD: 4.6 K/UL (ref 1.8–8)
NEUTS SEG NFR BLD: 66 % (ref 40–73)
PLATELET # BLD AUTO: 216 K/UL (ref 135–420)
POTASSIUM SERPL-SCNC: 4.1 MMOL/L (ref 3.5–5.5)
PROT SERPL-MCNC: 6.2 G/DL (ref 6.4–8.2)
PTH-INTACT SERPL-MCNC: 56.3 PG/ML (ref 18.4–88)
RBC # BLD AUTO: 5.67 M/UL (ref 4.2–5.3)
SODIUM SERPL-SCNC: 143 MMOL/L (ref 136–145)
TIBC SERPL-MCNC: 371 UG/DL (ref 250–450)
TRIGL SERPL-MCNC: 85 MG/DL (ref ?–150)
VIT B12 SERPL-MCNC: 378 PG/ML (ref 211–911)
VLDLC SERPL CALC-MCNC: 17 MG/DL
WBC # BLD AUTO: 6.9 K/UL (ref 4.6–13.2)

## 2020-09-09 PROCEDURE — 80061 LIPID PANEL: CPT

## 2020-09-09 PROCEDURE — 83540 ASSAY OF IRON: CPT

## 2020-09-09 PROCEDURE — 83970 ASSAY OF PARATHORMONE: CPT

## 2020-09-09 PROCEDURE — 82728 ASSAY OF FERRITIN: CPT

## 2020-09-09 PROCEDURE — 82607 VITAMIN B-12: CPT

## 2020-09-09 PROCEDURE — 85025 COMPLETE CBC W/AUTO DIFF WBC: CPT

## 2020-09-09 PROCEDURE — 80053 COMPREHEN METABOLIC PANEL: CPT

## 2020-09-09 PROCEDURE — 82306 VITAMIN D 25 HYDROXY: CPT

## 2020-09-09 NOTE — TELEPHONE ENCOUNTER
Called patient back  verified she has been made aware Dr. Gilbert Stark has been out of the office but will review her results as soon as she gets a chance to. Patient says her dizzy spells have become more intense and are lasting for mins. She says she feels light headed after the dizziness subsides. Patient told Dr. Gilbert Stark may send her a my chart message on her results or she may have me to call her back, advised that she keep an eye on her my chart messages.
Mrs. Betito Miller is calling in reference to her results. She states she is very worried because it's her head. Its unusual it takes this long for a result.  She would like a return call asap [phone]
Mrs. Radha Fung is calling to get the results to her CT Scan. She would like a return call to [phone].
Patient was seen for a virtual visit Friday 9-4-20
0

## 2020-09-15 ENCOUNTER — VIRTUAL VISIT (OUTPATIENT)
Dept: FAMILY MEDICINE CLINIC | Age: 60
End: 2020-09-15

## 2020-09-15 DIAGNOSIS — Z87.11 HX OF PEPTIC ULCER: ICD-10-CM

## 2020-09-15 DIAGNOSIS — E63.9 NUTRITIONAL DEFICIENCY: ICD-10-CM

## 2020-09-15 DIAGNOSIS — H81.12 BPPV (BENIGN PAROXYSMAL POSITIONAL VERTIGO), LEFT: Primary | ICD-10-CM

## 2020-09-15 DIAGNOSIS — Z78.0 POST-MENOPAUSAL: ICD-10-CM

## 2020-09-15 DIAGNOSIS — D50.8 OTHER IRON DEFICIENCY ANEMIA: ICD-10-CM

## 2020-09-15 DIAGNOSIS — Z98.84 HX OF GASTRIC BYPASS: ICD-10-CM

## 2020-09-15 DIAGNOSIS — Z12.31 ENCOUNTER FOR SCREENING MAMMOGRAM FOR MALIGNANT NEOPLASM OF BREAST: ICD-10-CM

## 2020-09-15 NOTE — PROGRESS NOTES
Qi Hilario is a 62 y.o. female who was seen by synchronous (real-time) audio-video technology on 9/15/2020 for No chief complaint on file. Assessment & Plan:     Diagnoses and all orders for this visit:    1. BPPV (benign paroxysmal positional vertigo), left    2. Other iron deficiency anemia  -     REFERRAL TO HEMATOLOGY    3. Nutritional deficiency  -     REFERRAL TO HEMATOLOGY    4. Hx of gastric bypass  -     DEXA BONE DENSITY STUDY AXIAL; Future    5. Hx of peptic ulcer    6. Post-menopausal  -     DEXA BONE DENSITY STUDY AXIAL; Future    7. Encounter for screening mammogram for malignant neoplasm of breast  -     [facility] MAMMO BI SCREENING INCL CAD; Future      BPPV: improving with maneuvers. continue. follow up as needed. Iron deficiency: uncontrolled. educated will always need some form of iron due to gastric bypass status. Preferring return for infusion therapy. Follow-up and Dispositions    · Return for pap and flu shot, 6 months for chronic medical conditions, non fasting labs 1 week prior. 712  Subjective:   FU vertigo: half somersault maneuver focused on left side effective. Almost completely resolved. FU nutritional deficiencies from gastric bypass. Iron infusions through Dr. Xochilt Young initiation previously effective. Results for orders placed or performed during the hospital encounter of 26/33/00   METABOLIC PANEL, COMPREHENSIVE   Result Value Ref Range    Sodium 143 136 - 145 mmol/L    Potassium 4.1 3.5 - 5.5 mmol/L    Chloride 109 100 - 111 mmol/L    CO2 30 21 - 32 mmol/L    Anion gap 4 3.0 - 18 mmol/L    Glucose 77 74 - 99 mg/dL    BUN 22 (H) 7.0 - 18 MG/DL    Creatinine 0.75 0.6 - 1.3 MG/DL    BUN/Creatinine ratio 29 (H) 12 - 20      GFR est AA >60 >60 ml/min/1.73m2    GFR est non-AA >60 >60 ml/min/1.73m2    Calcium 9.1 8.5 - 10.1 MG/DL    Bilirubin, total 0.4 0.2 - 1.0 MG/DL    ALT (SGPT) 34 13 - 56 U/L    AST (SGOT) 19 10 - 38 U/L    Alk.  phosphatase 89 45 - 117 U/L Protein, total 6.2 (L) 6.4 - 8.2 g/dL    Albumin 3.4 3.4 - 5.0 g/dL    Globulin 2.8 2.0 - 4.0 g/dL    A-G Ratio 1.2 0.8 - 1.7     CBC WITH AUTOMATED DIFF   Result Value Ref Range    WBC 6.9 4.6 - 13.2 K/uL    RBC 5.67 (H) 4.20 - 5.30 M/uL    HGB 11.9 (L) 12.0 - 16.0 g/dL    HCT 36.9 35.0 - 45.0 %    MCV 65.1 (L) 74.0 - 97.0 FL    MCH 21.0 (L) 24.0 - 34.0 PG    MCHC 32.2 31.0 - 37.0 g/dL    RDW 15.3 (H) 11.6 - 14.5 %    PLATELET 573 615 - 145 K/uL    NEUTROPHILS 66 40 - 73 %    LYMPHOCYTES 26 21 - 52 %    MONOCYTES 5 3 - 10 %    EOSINOPHILS 3 0 - 5 %    BASOPHILS 0 0 - 2 %    ABS. NEUTROPHILS 4.6 1.8 - 8.0 K/UL    ABS. LYMPHOCYTES 1.8 0.9 - 3.6 K/UL    ABS. MONOCYTES 0.4 0.05 - 1.2 K/UL    ABS. EOSINOPHILS 0.2 0.0 - 0.4 K/UL    ABS. BASOPHILS 0.0 0.0 - 0.1 K/UL    DF AUTOMATED     FERRITIN   Result Value Ref Range    Ferritin 26 8 - 388 NG/ML   PTH INTACT   Result Value Ref Range    Calcium 9.2 8.5 - 10.1 MG/DL    PTH, Intact 56.3 18.4 - 88.0 pg/mL   VITAMIN D, 25 HYDROXY   Result Value Ref Range    Vitamin D 25-Hydroxy 29.7 (L) 30 - 100 ng/mL   LIPID PANEL W/ REFLX DIRECT LDL   Result Value Ref Range    LIPID PROFILE          Cholesterol, total 107 <200 MG/DL    Triglyceride 85 <150 MG/DL    HDL Cholesterol 48 40 - 60 MG/DL    LDL, calculated 42 0 - 100 MG/DL    VLDL, calculated 17 MG/DL    CHOL/HDL Ratio 2.2 0 - 5.0     VITAMIN B12 & FOLATE   Result Value Ref Range    Vitamin B12 378 211 - 911 pg/mL    Folate 16.9 3.10 - 17.50 ng/mL   IRON PROFILE   Result Value Ref Range    Iron 49 (L) 50 - 175 ug/dL    TIBC 371 250 - 450 ug/dL    Iron % saturation 13 (L) 20 - 50 %       Prior to Admission medications    Medication Sig Start Date End Date Taking? Authorizing Provider   loratadine (CLARITIN) 10 mg tablet TAKE 1 TABLET DAILY 8/5/19  Yes Ximena Mcghee MD   rosuvastatin (CRESTOR) 10 mg tablet Take 1 Tab by mouth daily.  8/5/19  Yes Ximena Mcghee MD   pantoprazole (PROTONIX) 40 mg tablet Take 2 Tabs by mouth two (2) times a day. 8/5/19  Yes Mony Ramírez MD   aspirin delayed-release 81 mg tablet Take 1 Tab by mouth daily. 11/28/17  Yes Mony Ramírez MD   OTHER Tumeric PO   Yes Provider, Historical   IRON BIS-GLYCINAT/VIT C/FA/B12 (GENTLE IRON PO) Take 27 mg by mouth daily (with breakfast). Yes Provider, Historical   cyanocobalamin (VITAMIN B12) 500 mcg tablet Take 500 mcg by mouth daily. Yes Provider, Historical   ascorbic acid (VITAMIN C) 500 mg tablet Take  by mouth. Yes Provider, Historical   FOLIC ACID/MULTIVITS-MIN (ONE DAILY WOMENS 50 PLUS PO) Take  by mouth. Yes Provider, Historical         ROS    Objective:      BP Readings from Last 3 Encounters:   09/09/20 122/68   12/19/19 124/68   08/05/19 116/78       General: alert, cooperative, no distress   Mental  status: normal mood, behavior, speech, dress, motor activity, and thought processes, able to follow commands   HENT: NCAT   Neck: no visualized mass   Resp: no respiratory distress   Neuro: no gross deficits   Skin: no discoloration or lesions of concern on visible areas   Psychiatric: normal affect, consistent with stated mood, no evidence of hallucinations     Additional exam findings: We discussed the expected course, resolution and complications of the diagnosis(es) in detail. Medication risks, benefits, costs, interactions, and alternatives were discussed as indicated. I advised her to contact the office if her condition worsens, changes or fails to improve as anticipated. She expressed understanding with the diagnosis(es) and plan. Geno Eduardo, who was evaluated through a patient-initiated, synchronous (real-time) audio-video encounter, and/or her healthcare decision maker, is aware that it is a billable service, with coverage as determined by her insurance carrier. She provided verbal consent to proceed: n/a- consent obtained within past 12 months, and patient identification was verified.  It was conducted pursuant to the emergency declaration under the [address], [address] [place] [address] authority and the [employer] General Act. A caregiver was present when appropriate. Ability to conduct physical exam was limited. I was at home. The patient was at home.       Tara Johnson MD

## 2020-09-15 NOTE — Clinical Note
Please schedule pap and flu shot, 6 months for chronic medical conditions, non fasting labs 1 week prior

## 2020-09-15 NOTE — PROGRESS NOTES
Patient being seen today via VV to discuss her lab results and vertigo. She has no other concerns other than her results. 1. Have you been to the ER, urgent care clinic since your last visit? Hospitalized since your last visit? No  2. Have you seen or consulted any other health care providers outside of the [address] [place] [address] since your last visit? Include any pap smears or colon screening. No    Medication reconciliation has been completed with patient. Care team discussed/updated as well as pharmacy. Health Maintenance Due   Topic Date Due    Breast Cancer Screen Mammogram  11/08/2019    Flu Vaccine (1) 09/01/2020    PAP AKA CERVICAL CYTOLOGY  10/01/2020       Health Maintenance reviewed -Patient says she has her mammograms done in October, will schedule patient for well woman in office.

## 2020-11-11 ENCOUNTER — HOSPITAL ENCOUNTER (OUTPATIENT)
Dept: BONE DENSITY | Age: 60
Discharge: HOME | End: 2020-11-11
Attending: FAMILY MEDICINE
Payer: OTHER GOVERNMENT

## 2020-11-11 ENCOUNTER — HOSPITAL ENCOUNTER (OUTPATIENT)
Dept: MAMMOGRAPHY | Age: 60
Discharge: HOME | End: 2020-11-11
Attending: FAMILY MEDICINE
Payer: OTHER GOVERNMENT

## 2020-11-11 DIAGNOSIS — Z78.0 POST-MENOPAUSAL: ICD-10-CM

## 2020-11-11 DIAGNOSIS — Z98.84 HX OF GASTRIC BYPASS: ICD-10-CM

## 2020-11-11 DIAGNOSIS — Z12.31 ENCOUNTER FOR SCREENING MAMMOGRAM FOR MALIGNANT NEOPLASM OF BREAST: ICD-10-CM

## 2020-11-11 PROCEDURE — 77063 BREAST TOMOSYNTHESIS BI: CPT

## 2020-11-11 PROCEDURE — 77080 DXA BONE DENSITY AXIAL: CPT

## 2020-11-12 PROBLEM — M81.8 OTHER OSTEOPOROSIS WITHOUT CURRENT PATHOLOGICAL FRACTURE: Status: ACTIVE | Noted: 2020-11-12

## 2020-11-12 NOTE — PROGRESS NOTES
Adelita, your DEXA (bone density) was abnormal, showing osteoporosis. Please schedule a virtual visit with me to discuss what this means and what the options are to reduce your risk of a fracture.  Take good care, REX

## 2020-11-17 DIAGNOSIS — J30.9 ALLERGIC CONJUNCTIVITIS AND RHINITIS, BILATERAL: ICD-10-CM

## 2020-11-17 DIAGNOSIS — H10.13 ALLERGIC CONJUNCTIVITIS AND RHINITIS, BILATERAL: ICD-10-CM

## 2020-11-17 DIAGNOSIS — I25.10 CORONARY ARTERY DISEASE INVOLVING NATIVE HEART WITHOUT ANGINA PECTORIS, UNSPECIFIED VESSEL OR LESION TYPE: ICD-10-CM

## 2020-11-17 DIAGNOSIS — K21.9 GASTROESOPHAGEAL REFLUX DISEASE WITHOUT ESOPHAGITIS: ICD-10-CM

## 2020-11-17 RX ORDER — PANTOPRAZOLE SODIUM 40 MG/1
80 TABLET, DELAYED RELEASE ORAL 2 TIMES DAILY
Qty: 360 TAB | Refills: 4 | Status: SHIPPED | OUTPATIENT
Start: 2020-11-17 | End: 2021-12-13 | Stop reason: SDUPTHER

## 2020-11-17 RX ORDER — LORATADINE 10 MG/1
TABLET ORAL
Qty: 90 TAB | Refills: 4 | Status: SHIPPED | OUTPATIENT
Start: 2020-11-17 | End: 2021-12-13 | Stop reason: SDUPTHER

## 2020-11-17 RX ORDER — ROSUVASTATIN CALCIUM 10 MG/1
10 TABLET, COATED ORAL DAILY
Qty: 90 TAB | Refills: 4 | Status: SHIPPED | OUTPATIENT
Start: 2020-11-17 | End: 2021-12-13 | Stop reason: SDUPTHER

## 2020-11-17 NOTE — TELEPHONE ENCOUNTER
Patient called requesting refill on her loratadine (CLARITIN) 10 mg tablet ,  rosuvastatin (CRESTOR) 10 mg tablet, and pantoprazole (PROTONIX) 40 mg tablet. Please review and advise and contact patient when or if medication is ready. She can be reached at [phone].

## 2020-11-17 NOTE — TELEPHONE ENCOUNTER
Patient called the office backs he says she would like her meds sent to [facility] and says she only has 2 days worth of her Crestor left she did not realize this until Friday.

## 2020-11-17 NOTE — TELEPHONE ENCOUNTER
Patient was last seen via VV for chronic condition follow up on 9/15/20 with 6 month follow up plan. Medication was last sent in for patient on 08/5/2019 one year supply. Medications have been pended please review and sign if appropriate. Called patient to verify which pharmacy she would like meds sent to no answer left message asking that she call the office back.

## 2020-12-01 ENCOUNTER — VIRTUAL VISIT (OUTPATIENT)
Dept: FAMILY MEDICINE CLINIC | Age: 60
End: 2020-12-01
Payer: OTHER GOVERNMENT

## 2020-12-01 DIAGNOSIS — M81.8 OTHER OSTEOPOROSIS WITHOUT CURRENT PATHOLOGICAL FRACTURE: Primary | ICD-10-CM

## 2020-12-01 DIAGNOSIS — Z98.84 HX OF GASTRIC BYPASS: ICD-10-CM

## 2020-12-01 PROCEDURE — 99214 OFFICE O/P EST MOD 30 MIN: CPT | Performed by: FAMILY MEDICINE

## 2020-12-01 RX ORDER — GLUCOSAMINE SULFATE 1500 MG
POWDER IN PACKET (EA) ORAL DAILY
COMMUNITY
End: 2022-05-10

## 2020-12-01 RX ORDER — ZINC GLUCONATE 10 MG
LOZENGE ORAL
COMMUNITY

## 2020-12-01 NOTE — PROGRESS NOTES
Saira Quinn is a 62 y.o. female who was seen by synchronous (real-time) audio-video technology on 12/1/2020 for Results        Assessment & Plan:   Diagnoses and all orders for this visit:    1. Other osteoporosis without current pathological fracture  -     REFERRAL TO RHEUMATOLOGY    2. Hx of gastric bypass  -     REFERRAL TO RHEUMATOLOGY      New. Uncontrolled    IV bisphosphonates = Recommended initial therapy. Isolated reports of renal impairment and ARF - could be due to too rapid infusion, increased risk in setting of diuretics. Ensure good oral health   Once yearly for 3 years then reassess risk for planning    Continue Vit D    Referring. Getting flu shot    Follow-up and Dispositions    · Return for pap smear. 712  Subjective:     DEXA Results (most recent):  Results from Hospital Encounter encounter on 11/11/20   DEXA BONE DENSITY STUDY AXIAL    Narrative DEXA BONE DENSITOMETRY, CENTRAL    CLINICAL INDICATION/HISTORY: Post menopausal. 43-year-old female for baseline  study. Gastric bypass. TECHNIQUE: Using GE LUNAR Prodigy densitometer, bone density measurement was  performed in the lumbar spine, the proximal left and right femora. T Score  refers to standard deviations above or below average compared to a young adult  of the same sex. Z Score refers to standard deviations above or below average  compared to a patient of the same sex, age, race and weight. COMPARISON: None available.      FINDINGS:     Lumbar Spine Levels: L1-L4  Mean Bone Mineral Density (BMD):  0.981 g/cm2    T Score: -1.7  Z Score: -1.5    Left Total Proximal Femur BMD: 0.724 g/cm2    T Score: -2.2   Z Score: -2.1     Right Total Proximal Femur BMD: 0.713 g/cm2   T Score: -2.3    Z Score: -2.2     Left Femoral Neck BMD: 0.757  g/cm2    T Score: -2.0  Z Score: -1.5    Right Femoral Neck BMD: 0.675  g/cm2    T Score: -2.6  Z Score: -2.0       Impression IMPRESSION:    BMD measures consistent with osteoporosis. Based upon current ISCD guidelines, the patient's overall diagnostic category,  selected using WHO criteria in postmenopausal women and males aged 48 and above,  is selected based upon the lowest T Score from among the lumbar spine, total  femur, femoral neck, (or distal third radius if measured). WHO Definition of Osteoporosis and Osteopenia on DXA (specified for post  menopausal  females):      Normal:                     T Score at or above -1 SD    Osteopenia:              T Score between -1 and -2.5 SD    Osteoporosis:          T Score at or below -2.5 SD    The risk of fracture approximately doubles for each 1 SD decrease in T Score. It is important to consider other factors in assessing a patient's risk of  fracture, including age, risk of falling/injury, history of fragility fracture,  family history of osteoporosis, smoking, low weight. Various fracture risk tools have been developed for adult patients and are  available online. For example, the FRAX tool developed by [facility] is widely used. Reference www.iscd.org. It is also important to note that DXA measures bone density but does not  distinguish among causes of decreased bone density, which include primary versus  secondary osteoporosis (such as metabolic bone disorders or possible effects of  medications) and also other conditions (such as osteomalacia). Clinical  considerations should determine what additional evaluation may be warranted to  exclude secondary conditions in a patient with low bone density. Please note that reliable, valid comparisons can not be made between studies  which have been performed on different densitometers. If clinically warranted,  follow up study performed at this site would best permit assessment of trend for  possible change in bone mineral density over time in comparison to this study.     Thank you for this referral.     Lab Results   Component Value Date/Time    Sodium 143 09/09/2020 11:52 AM    Potassium 4.1 09/09/2020 11:52 AM    Chloride 109 09/09/2020 11:52 AM    CO2 30 09/09/2020 11:52 AM    Anion gap 4 09/09/2020 11:52 AM    Glucose 77 09/09/2020 11:52 AM    BUN 22 (H) 09/09/2020 11:52 AM    Creatinine 0.75 09/09/2020 11:52 AM    BUN/Creatinine ratio 29 (H) 09/09/2020 11:52 AM    GFR est AA >60 09/09/2020 11:52 AM    GFR est non-AA >60 09/09/2020 11:52 AM    Calcium 9.1 09/09/2020 11:52 AM    Calcium 9.2 09/09/2020 11:52 AM     Lab Results   Component Value Date/Time    Vitamin D 25-Hydroxy 29.7 (L) 09/09/2020 11:52 AM    Vitamin D 25-Hydroxy 25.9 (L) 07/29/2019 01:32 PM    Vitamin D 25-Hydroxy 35.2 10/24/2017 10:01 AM         Prior to Admission medications    Medication Sig Start Date End Date Taking? Authorizing Provider   CALCIUM CITRATE PO Take 1,000 mg by mouth daily. Yes Provider, Historical   cholecalciferol (Vitamin D3) 25 mcg (1,000 unit) cap Take  by mouth daily. Yes Provider, Historical   magnesium 250 mg tab Take  by mouth. Yes Provider, Historical   loratadine (CLARITIN) 10 mg tablet TAKE 1 TABLET DAILY 11/17/20  Yes Vanessa Ramsay MD   rosuvastatin (CRESTOR) 10 mg tablet Take 1 Tab by mouth daily. 11/17/20  Yes Vanessa Ramsay MD   pantoprazole (PROTONIX) 40 mg tablet Take 2 Tabs by mouth two (2) times a day. 11/17/20  Yes Vanessa Ramsay MD   aspirin delayed-release 81 mg tablet Take 1 Tab by mouth daily. 11/28/17  Yes Vanessa Ramsay MD   OTHER Tumeric PO   Yes Provider, Historical   IRON BIS-GLYCINAT/VIT C/FA/B12 (GENTLE IRON PO) Take 27 mg by mouth daily (with breakfast). Yes Provider, Historical   cyanocobalamin (VITAMIN B12) 500 mcg tablet Take 500 mcg by mouth daily. Yes Provider, Historical   ascorbic acid (VITAMIN C) 500 mg tablet Take  by mouth. Yes Provider, Historical   FOLIC ACID/MULTIVITS-MIN (ONE DAILY WOMENS 50 PLUS PO) Take  by mouth. Yes Provider, Historical         ROS    Objective:   No flowsheet data found.    General: alert, cooperative, no distress   Mental  status: normal mood, behavior, speech, dress, motor activity, and thought processes, able to follow commands   HENT: NCAT   Neck: no visualized mass   Resp: no respiratory distress   Neuro: no gross deficits   Skin: no discoloration or lesions of concern on visible areas   Psychiatric: normal affect, consistent with stated mood, no evidence of hallucinations     Additional exam findings: We discussed the expected course, resolution and complications of the diagnosis(es) in detail. Medication risks, benefits, costs, interactions, and alternatives were discussed as indicated. I advised her to contact the office if her condition worsens, changes or fails to improve as anticipated. She expressed understanding with the diagnosis(es) and plan. Migel Sarah, who was evaluated through a patient-initiated, synchronous (real-time) audio-video encounter, and/or her healthcare decision maker, is aware that it is a billable service, with coverage as determined by her insurance carrier. She provided verbal consent to proceed: n/a- consent obtained within past 12 months, and patient identification was verified. It was conducted pursuant to the emergency declaration under the [facility], [address] [place] [address] authority and the [employer] Act. A caregiver was present when appropriate. Ability to conduct physical exam was limited. I was at home. The patient was at home.       Ej Lucas MD

## 2020-12-01 NOTE — PROGRESS NOTES
Patient being seen today to discuss her bone density scan. She says she has started taking OTC calcium and vitamin d3 these have been added to her med list.       1. Have you been to the ER, urgent care clinic since your last visit? Hospitalized since your last visit? No  2. Have you seen or consulted any other health care providers outside of the [address] [place] [address] since your last visit? Include any pap smears or colon screening. No    Medication reconciliation has been completed with patient. Care team discussed/updated as well as pharmacy. Health Maintenance Due   Topic Date Due    Flu Vaccine (1) 09/01/2020    PAP AKA CERVICAL CYTOLOGY  10/01/2020        Health Maintenance reviewed - Planning to go to pharmacy for her flu vaccine.

## 2021-03-19 NOTE — PATIENT INSTRUCTIONS
3/19/21 I left a voicemail to schedule six months follow up w/labs 1 wk prior and to schedule for papsmear

## 2021-12-13 ENCOUNTER — TELEPHONE (OUTPATIENT)
Dept: FAMILY MEDICINE CLINIC | Age: 61
End: 2021-12-13

## 2021-12-13 DIAGNOSIS — K21.9 GASTROESOPHAGEAL REFLUX DISEASE WITHOUT ESOPHAGITIS: ICD-10-CM

## 2021-12-13 DIAGNOSIS — H10.13 ALLERGIC CONJUNCTIVITIS AND RHINITIS, BILATERAL: ICD-10-CM

## 2021-12-13 DIAGNOSIS — I25.10 CORONARY ARTERY DISEASE INVOLVING NATIVE HEART WITHOUT ANGINA PECTORIS, UNSPECIFIED VESSEL OR LESION TYPE: ICD-10-CM

## 2021-12-13 DIAGNOSIS — J30.9 ALLERGIC CONJUNCTIVITIS AND RHINITIS, BILATERAL: ICD-10-CM

## 2021-12-13 DIAGNOSIS — Z98.84 HX OF GASTRIC BYPASS: ICD-10-CM

## 2021-12-13 DIAGNOSIS — E63.9 NUTRITIONAL DEFICIENCY: Primary | ICD-10-CM

## 2021-12-13 NOTE — TELEPHONE ENCOUNTER
This patient contacted office for the following prescriptions to be filled:    Medication requested :   Requested Prescriptions     Pending Prescriptions Disp Refills    pantoprazole (PROTONIX) 40 mg tablet 360 Tablet 4     Sig: Take 2 Tablets by mouth two (2) times a day.  rosuvastatin (CRESTOR) 10 mg tablet 90 Tablet 4     Sig: Take 1 Tablet by mouth daily.  loratadine (CLARITIN) 10 mg tablet 90 Tablet 4     Sig: TAKE 1 TABLET DAILY     PCP: Dr. Judy Hdez or Print: [facility]  Mail order or Local pharmacy: [phone]    Scheduled appointment if not seen by current providers in office: LOV 12/1/2020, next appt I got her scheduled for is 1/25/21. She will need an interval supply.

## 2021-12-15 NOTE — TELEPHONE ENCOUNTER
Patient has f/u scheduled on 21 and will need meds sent in prior. She will also need new orders placed for labs as the older orders have .

## 2021-12-17 RX ORDER — LORATADINE 10 MG/1
TABLET ORAL
Qty: 30 TABLET | Refills: 0 | Status: SHIPPED | OUTPATIENT
Start: 2021-12-17 | End: 2022-01-25 | Stop reason: SDUPTHER

## 2021-12-17 RX ORDER — PANTOPRAZOLE SODIUM 40 MG/1
80 TABLET, DELAYED RELEASE ORAL 2 TIMES DAILY
Qty: 60 TABLET | Refills: 0 | Status: SHIPPED | OUTPATIENT
Start: 2021-12-17 | End: 2022-01-25 | Stop reason: SDUPTHER

## 2021-12-17 RX ORDER — ROSUVASTATIN CALCIUM 10 MG/1
10 TABLET, COATED ORAL DAILY
Qty: 30 TABLET | Refills: 0 | Status: SHIPPED | OUTPATIENT
Start: 2021-12-17 | End: 2022-01-25 | Stop reason: SDUPTHER

## 2022-01-19 ENCOUNTER — HOSPITAL ENCOUNTER (OUTPATIENT)
Dept: LAB | Age: 62
Discharge: HOME | End: 2022-01-19
Payer: OTHER GOVERNMENT

## 2022-01-19 ENCOUNTER — CLINICAL SUPPORT (OUTPATIENT)
Dept: FAMILY MEDICINE CLINIC | Age: 62
End: 2022-01-19
Payer: OTHER GOVERNMENT

## 2022-01-19 DIAGNOSIS — E63.9 NUTRITIONAL DEFICIENCY: ICD-10-CM

## 2022-01-19 DIAGNOSIS — Z98.84 HX OF GASTRIC BYPASS: Primary | ICD-10-CM

## 2022-01-19 DIAGNOSIS — I25.10 CORONARY ARTERY DISEASE INVOLVING NATIVE HEART WITHOUT ANGINA PECTORIS, UNSPECIFIED VESSEL OR LESION TYPE: ICD-10-CM

## 2022-01-19 DIAGNOSIS — Z98.84 HX OF GASTRIC BYPASS: ICD-10-CM

## 2022-01-19 LAB
ALBUMIN SERPL-MCNC: 3.8 G/DL (ref 3.4–5)
ALBUMIN/GLOB SERPL: 1.3 {RATIO} (ref 0.8–1.7)
ALP SERPL-CCNC: 92 U/L (ref 45–117)
ALT SERPL-CCNC: 26 U/L (ref 13–56)
ANION GAP SERPL CALC-SCNC: 3 MMOL/L (ref 3–18)
AST SERPL-CCNC: 14 U/L (ref 10–38)
BASOPHILS # BLD: 0.1 K/UL (ref 0–0.1)
BASOPHILS NFR BLD: 1 % (ref 0–2)
BILIRUB SERPL-MCNC: 0.5 MG/DL (ref 0.2–1)
BUN SERPL-MCNC: 21 MG/DL (ref 7–18)
BUN/CREAT SERPL: 26 (ref 12–20)
CALCIUM SERPL-MCNC: 7.5 MG/DL (ref 8.5–10.1)
CALCIUM SERPL-MCNC: 9.6 MG/DL (ref 8.5–10.1)
CHLORIDE SERPL-SCNC: 106 MMOL/L (ref 100–111)
CHOLEST SERPL-MCNC: 109 MG/DL
CO2 SERPL-SCNC: 28 MMOL/L (ref 21–32)
CREAT SERPL-MCNC: 0.81 MG/DL (ref 0.6–1.3)
DIFFERENTIAL METHOD BLD: ABNORMAL
EOSINOPHIL # BLD: 0.2 K/UL (ref 0–0.4)
EOSINOPHIL NFR BLD: 3 % (ref 0–5)
ERYTHROCYTE [DISTWIDTH] IN BLOOD BY AUTOMATED COUNT: 15.8 % (ref 11.6–14.5)
FERRITIN SERPL-MCNC: 26 NG/ML (ref 8–388)
FOLATE SERPL-MCNC: 12 NG/ML (ref 3.1–17.5)
GLOBULIN SER CALC-MCNC: 3 G/DL (ref 2–4)
GLUCOSE SERPL-MCNC: 89 MG/DL (ref 74–99)
HCT VFR BLD AUTO: 38.1 % (ref 35–45)
HDLC SERPL-MCNC: 44 MG/DL (ref 40–60)
HDLC SERPL: 2.5 {RATIO} (ref 0–5)
HGB BLD-MCNC: 11.7 G/DL (ref 12–16)
IMM GRANULOCYTES # BLD AUTO: 0 K/UL (ref 0–0.04)
IMM GRANULOCYTES NFR BLD AUTO: 0 % (ref 0–0.5)
IRON SATN MFR SERPL: 11 % (ref 20–50)
IRON SERPL-MCNC: 42 UG/DL (ref 50–175)
LDLC SERPL CALC-MCNC: 36.2 MG/DL (ref 0–100)
LIPID PROFILE,FLP: NORMAL
LYMPHOCYTES # BLD: 2.8 K/UL (ref 0.9–3.6)
LYMPHOCYTES NFR BLD: 36 % (ref 21–52)
MCH RBC QN AUTO: 20.9 PG (ref 24–34)
MCHC RBC AUTO-ENTMCNC: 30.7 G/DL (ref 31–37)
MCV RBC AUTO: 67.9 FL (ref 78–100)
MONOCYTES # BLD: 0.5 K/UL (ref 0.05–1.2)
MONOCYTES NFR BLD: 7 % (ref 3–10)
NEUTS SEG # BLD: 4.2 K/UL (ref 1.8–8)
NEUTS SEG NFR BLD: 53 % (ref 40–73)
NRBC # BLD: 0 K/UL (ref 0–0.01)
NRBC BLD-RTO: 0 PER 100 WBC
PLATELET # BLD AUTO: 210 K/UL (ref 135–420)
PLATELET COMMENTS,PCOM: ABNORMAL
PMV BLD AUTO: 11.2 FL (ref 9.2–11.8)
POTASSIUM SERPL-SCNC: 4.1 MMOL/L (ref 3.5–5.5)
PROT SERPL-MCNC: 6.8 G/DL (ref 6.4–8.2)
PTH-INTACT SERPL-MCNC: 63.4 PG/ML (ref 18.4–88)
RBC # BLD AUTO: 5.61 M/UL (ref 4.2–5.3)
RBC MORPH BLD: ABNORMAL
RBC MORPH BLD: ABNORMAL
SODIUM SERPL-SCNC: 137 MMOL/L (ref 136–145)
TIBC SERPL-MCNC: 397 UG/DL (ref 250–450)
TRIGL SERPL-MCNC: 144 MG/DL (ref ?–150)
VIT B12 SERPL-MCNC: 356 PG/ML (ref 211–911)
VLDLC SERPL CALC-MCNC: 28.8 MG/DL
WBC # BLD AUTO: 7.8 K/UL (ref 4.6–13.2)

## 2022-01-19 PROCEDURE — 82607 VITAMIN B-12: CPT

## 2022-01-19 PROCEDURE — 83970 ASSAY OF PARATHORMONE: CPT

## 2022-01-19 PROCEDURE — 80061 LIPID PANEL: CPT

## 2022-01-19 PROCEDURE — 36415 COLL VENOUS BLD VENIPUNCTURE: CPT | Performed by: FAMILY MEDICINE

## 2022-01-19 PROCEDURE — 83540 ASSAY OF IRON: CPT

## 2022-01-19 PROCEDURE — 85025 COMPLETE CBC W/AUTO DIFF WBC: CPT

## 2022-01-19 PROCEDURE — 82728 ASSAY OF FERRITIN: CPT

## 2022-01-19 PROCEDURE — 80053 COMPREHEN METABOLIC PANEL: CPT

## 2022-01-19 NOTE — PROGRESS NOTES
Patient here for [place] lab draw name and  verified venipuncture performed on patients left arm was successful patient tolerated well.

## 2022-01-25 ENCOUNTER — OFFICE VISIT (OUTPATIENT)
Dept: FAMILY MEDICINE CLINIC | Age: 62
End: 2022-01-25
Payer: OTHER GOVERNMENT

## 2022-01-25 VITALS
HEART RATE: 64 BPM | SYSTOLIC BLOOD PRESSURE: 120 MMHG | HEIGHT: 66 IN | WEIGHT: 259.2 LBS | BODY MASS INDEX: 41.66 KG/M2 | RESPIRATION RATE: 12 BRPM | DIASTOLIC BLOOD PRESSURE: 72 MMHG | TEMPERATURE: 97.3 F | OXYGEN SATURATION: 97 %

## 2022-01-25 DIAGNOSIS — D50.8 OTHER IRON DEFICIENCY ANEMIA: ICD-10-CM

## 2022-01-25 DIAGNOSIS — H10.13 ALLERGIC CONJUNCTIVITIS OF BOTH EYES AND RHINITIS: ICD-10-CM

## 2022-01-25 DIAGNOSIS — Z87.11 HX OF PEPTIC ULCER: ICD-10-CM

## 2022-01-25 DIAGNOSIS — J30.9 ALLERGIC CONJUNCTIVITIS OF BOTH EYES AND RHINITIS: ICD-10-CM

## 2022-01-25 DIAGNOSIS — E63.9 NUTRITIONAL DEFICIENCY: ICD-10-CM

## 2022-01-25 DIAGNOSIS — M81.8 OTHER OSTEOPOROSIS WITHOUT CURRENT PATHOLOGICAL FRACTURE: ICD-10-CM

## 2022-01-25 DIAGNOSIS — Z98.84 HX OF GASTRIC BYPASS: ICD-10-CM

## 2022-01-25 DIAGNOSIS — F32.1 CURRENT MODERATE EPISODE OF MAJOR DEPRESSIVE DISORDER WITHOUT PRIOR EPISODE (HCC): ICD-10-CM

## 2022-01-25 DIAGNOSIS — D56.3 THALASSEMIA TRAIT: ICD-10-CM

## 2022-01-25 DIAGNOSIS — E66.01 MORBID OBESITY (HCC): ICD-10-CM

## 2022-01-25 DIAGNOSIS — K21.9 GASTROESOPHAGEAL REFLUX DISEASE WITHOUT ESOPHAGITIS: ICD-10-CM

## 2022-01-25 DIAGNOSIS — I25.10 CORONARY ARTERY DISEASE INVOLVING NATIVE HEART WITHOUT ANGINA PECTORIS, UNSPECIFIED VESSEL OR LESION TYPE: Primary | ICD-10-CM

## 2022-01-25 PROCEDURE — 99214 OFFICE O/P EST MOD 30 MIN: CPT | Performed by: FAMILY MEDICINE

## 2022-01-25 RX ORDER — LORATADINE 10 MG/1
TABLET ORAL
Qty: 90 TABLET | Refills: 3 | Status: SHIPPED | OUTPATIENT
Start: 2022-01-25

## 2022-01-25 RX ORDER — ROSUVASTATIN CALCIUM 10 MG/1
10 TABLET, COATED ORAL DAILY
Qty: 90 TABLET | Refills: 3 | Status: SHIPPED | OUTPATIENT
Start: 2022-01-25

## 2022-01-25 RX ORDER — PANTOPRAZOLE SODIUM 40 MG/1
80 TABLET, DELAYED RELEASE ORAL 2 TIMES DAILY
Qty: 180 TABLET | Refills: 3 | Status: SHIPPED | OUTPATIENT
Start: 2022-01-25 | End: 2022-01-28 | Stop reason: SDUPTHER

## 2022-01-25 NOTE — PROGRESS NOTES
Patient being seen in office today for her 6 month chronic condition follow up and lab review. 1. \"Have you been to the ER, urgent care clinic since your last visit? Hospitalized since your last visit? \" No    2. \"Have you seen or consulted any other health care providers outside of the [address] [place] [address] since your last visit? \" No     3. For patients aged 39-70: Has the patient had a colonoscopy / FIT/ Cologuard? Yes - no Care Gap present      If the patient is female:    4. For patients aged 41-77: Has the patient had a mammogram within the past 2 years? Yes - no Care Gap present  See top three    5. For patients aged 21-65: Has the patient had a pap smear?  No

## 2022-01-25 NOTE — PROGRESS NOTES
Atilio Middleton (: 1960) is a 59 y.o. female, established patient, here for:    ASSESSMENT/PLAN:  1. Coronary artery disease involving native heart without angina pectoris, unspecified vessel or lesion type  Assessment & Plan:  Asymptomatic. Continue medical management with rosuvastatin   Orders:  -     rosuvastatin (CRESTOR) 10 mg tablet; Take 1 Tablet by mouth daily. , Normal, Disp-90 Tablet, R-3  2. Current moderate episode of major depressive disorder without prior episode [facility])  Assessment & Plan:  Situational. Declining referral for therapy and medication at present, preferring self care. Low risk self harm. Advised F[employer] Self Compassion. Normalized experience in pandemic. Open door to follow up otherwise will revisit in 3 months. 3. Other iron deficiency anemia  Assessment & Plan:  Uncontrolled with lapse in replacement. Resume. Reassess 3 months. Orders:  -     CBC WITH AUTOMATED DIFF; Future  -     FERRITIN; Future  -     IRON PROFILE; Future  -     MAGNESIUM; Future  4. Hx of gastric bypass  5. Nutritional deficiency  6. Gastroesophageal reflux disease without esophagitis  Assessment & Plan: With history of PUD. Well controlled on pantoprazole 40 mg bid. continue   Orders:  -     pantoprazole (PROTONIX) 40 mg tablet; Take 2 Tablets by mouth two (2) times a day., Normal, Disp-180 Tablet, R-3  -     MAGNESIUM; Future  7. Thalassemia trait  8. Hx of peptic ulcer  9. Morbid obesity ([address]  Assessment & Plan:  Self soothing behaviors. Get outside. Exercise compassion. 10. Allergic conjunctivitis of both eyes and rhinitis  -     loratadine (CLARITIN) 10 mg tablet; TAKE 1 TABLET DAILY, Normal, Disp-90 Tablet, R-3  11.  Other osteoporosis without current pathological fracture  -     VITAMIN D, 25 HYDROXY; Future      Return in about 3 months (around 2022) for iron deficiency and mood follow up, labs 1 week prior, (30) (PHQ), schedule well woman with pap.      SUBJECTIVE/OBJECTIVE:  HPI    Follow-up CAD - on rosuvastatin 10 mg. Asymptomatic     follow up nutritional deficiencies post gastric bypass. - Iron deficiency: Reassuring endoscopies by Dr. Claude Varma. Iron injections May-June 2018 timeframe. -B12 deficiency    Lapsed her once daily bariatric iron formulation and other vitamins.     follow up GERD: pantoprazole 40 mg bid. asymptomatic  follow up AR: loratadine. Asymptomatic    Abnormal depression screen- \"I think the last 2 years have taken their toll on me. \" afraid to ride post vertigo. Feeling shame for experience and reluctant to discuss with others. At all. Physical Exam  Constitutional:       General: She is not in acute distress. Appearance: She is well-developed. She is obese. HENT:      Head: Normocephalic and atraumatic. Pulmonary:      Effort: Pulmonary effort is normal.   Neurological:      Mental Status: She is alert and oriented to person, place, and time. Psychiatric:         Behavior: Behavior normal.         Thought Content:  Thought content normal.         Judgment: Judgment normal.      Comments: Tearful at times               -- Doni Chu MD

## 2022-01-25 NOTE — PATIENT INSTRUCTIONS
F[employer] Self Compassion by Farhat Avendano     Recovering From Depression: Care Instructions  Your Care Instructions     Taking good care of yourself is important as you recover from depression. In time, your symptoms will fade as your treatment takes hold. Do not give up. Instead, focus your energy on getting better. Your mood will improve. It just takes some time. Focus on things that can help you feel better, such as being with friends and family, eating well, and getting enough rest. But take things slowly. Do not do too much too soon. You will begin to feel better gradually. Follow-up care is a key part of your treatment and safety. Be sure to make and go to all appointments, and call your doctor if you are having problems. It's also a good idea to know your test results and keep a list of the medicines you take. How can you care for yourself at home? Be realistic  · If you have a large task to do, break it up into smaller steps you can handle, and just do what you can. · You may want to put off important decisions until your depression has lifted. If you have plans that will have a major impact on your life, such as marriage, divorce, or a job change, try to wait a bit. Talk it over with friends and loved ones who can help you look at the overall picture first.  · Reaching out to people for help is important. Do not isolate yourself. Let your family and friends help you. Find someone you can trust and confide in, and talk to that person. · Be patient, and be kind to yourself. Remember that depression is not your fault and is not something you can overcome with willpower alone. Treatment is important for depression, just like for any other illness. Feeling better takes time, and your mood will improve little by little. Stay active  · Stay busy and get outside. Take a walk, or try some other light exercise. · Talk with your doctor about an exercise program. Exercise can help with mild depression.   · Go to a movie or concert. Take part in a Mu-ism activity or other social gathering. Go to a [employer] game. · Ask a friend to have dinner with you. Take care of yourself  · Eat a balanced diet with plenty of fresh fruits and vegetables, whole grains, and lean protein. If you have lost your appetite, eat small snacks rather than large meals. · Avoid using illegal drugs or marijuana and drinking alcohol. Do not take medicines that have not been prescribed for you. They may interfere with medicines you may be taking for depression, or they may make your depression worse. · Take your medicines exactly as they are prescribed. You may start to feel better within 1 to 3 weeks of taking antidepressant medicine. But it can take as many as 6 to 8 weeks to see more improvement. If you have questions or concerns about your medicines, or if you do not notice any improvement by 3 weeks, talk to your doctor. · Continue to take your medicine after your symptoms improve. Taking your medicine for at least 6 months after you feel better can help keep you from getting depressed again. If this isn't the first time you have been depressed, your doctor may recommend you to take medicine even longer. · If you have any side effects from your medicine, tell your doctor. Many side effects are mild and will go away on their own after you have been taking the medicine for a few weeks. Some may last longer. Talk to your doctor if side effects are bothering you too much. You might be able to try a different medicine. · Continue counseling. It may help prevent depression from returning, especially if you've had multiple episodes of depression. Talk with your counselor if you are having a hard time attending your sessions or you think the sessions aren't working. Don't just stop going. · Get enough sleep. Talk to your doctor if you are having problems sleeping. · Avoid sleeping pills unless they are prescribed by the doctor treating your depression. Sleeping pills may make you groggy during the day, and they may interact with other medicine you are taking. · If you have any other illnesses, such as diabetes, heart disease, or high blood pressure, make sure to continue with your treatment. Tell your doctor about all of the medicines you take, including those with or without a prescription. · If you or someone you know talks about suicide, self-harm, or feeling hopeless, get help right away. Call the [facility] at 1-800-273-talk (0-422.645.8407) or text HOME to 731896 to access the Crisis Text Line. Consider saving these numbers in your phone. When should you call for help? Call 021 anytime you think you may need emergency care. For example, call if:    · You feel like hurting yourself or someone else.     · Someone you know has depression and is about to attempt or is attempting suicide. Call your doctor now or seek immediate medical care if:    · You hear voices.     · Someone you know has depression and:  ? Starts to give away his or her possessions. ? Uses illegal drugs or drinks alcohol heavily. ? Talks or writes about death, including writing suicide notes or talking about guns, knives, or pills. ? Starts to spend a lot of time alone. ? Acts very aggressively or suddenly appears calm. Watch closely for changes in your health, and be sure to contact your doctor if:    · You do not get better as expected. Where can you learn more? Go to http://www.gray.com/  Enter N529 in the search box to learn more about \"Recovering From Depression: Care Instructions. \"  Current as of: June 16, 2021               Content Version: 13.0  © 5121-1509 [employer]. Care instructions adapted under license by [employer] (which disclaims liability or warranty for this information).  If you have questions about a medical condition or this instruction, always ask your healthcare professional. [employer] disclaims any warranty or liability for your use of this information.

## 2022-01-27 ENCOUNTER — TELEPHONE (OUTPATIENT)
Dept: FAMILY MEDICINE CLINIC | Age: 62
End: 2022-01-27

## 2022-01-27 DIAGNOSIS — K21.9 GASTROESOPHAGEAL REFLUX DISEASE WITHOUT ESOPHAGITIS: ICD-10-CM

## 2022-01-27 NOTE — TELEPHONE ENCOUNTER
Pt called in reference to her medication PROTONIX. Pt states the medication quantity should say 360 instead of 180. If any questions the best call back number is ([phone]). Please review and revise.

## 2022-01-28 RX ORDER — PANTOPRAZOLE SODIUM 40 MG/1
80 TABLET, DELAYED RELEASE ORAL 2 TIMES DAILY
Qty: 360 TABLET | Refills: 3 | Status: SHIPPED | OUTPATIENT
Start: 2022-01-28

## 2022-01-28 NOTE — TELEPHONE ENCOUNTER
Patients medication was sent in on 125/22 with only qty of 180 and needs to be increased to 360. Patient takes two tabs twice a day. Medication has been pended with updated qty of 360 please review and sign if appropriate.

## 2022-02-08 DIAGNOSIS — D50.8 OTHER IRON DEFICIENCY ANEMIA: ICD-10-CM

## 2022-02-08 DIAGNOSIS — Z98.84 HX OF GASTRIC BYPASS: ICD-10-CM

## 2022-02-08 DIAGNOSIS — E63.9 NUTRITIONAL DEFICIENCY: Primary | ICD-10-CM

## 2022-02-08 NOTE — PROGRESS NOTES
Mark Leon, I don't recall if I'd already commented on your labs. Iron is low, as we expected. Now that you're back to taking everything (right?) I'm confident we'll see improvement when you have labs done before your next visit in April.  Take care, REX

## 2022-03-19 PROBLEM — E66.01 MORBID OBESITY (HCC): Status: ACTIVE | Noted: 2018-03-20

## 2022-03-19 PROBLEM — M81.8 OTHER OSTEOPOROSIS WITHOUT CURRENT PATHOLOGICAL FRACTURE: Status: ACTIVE | Noted: 2020-11-12

## 2022-03-19 PROBLEM — I25.10 CORONARY ARTERY DISEASE INVOLVING NATIVE HEART WITHOUT ANGINA PECTORIS: Status: ACTIVE | Noted: 2017-10-24

## 2022-03-20 PROBLEM — F32.1 CURRENT MODERATE EPISODE OF MAJOR DEPRESSIVE DISORDER WITHOUT PRIOR EPISODE (HCC): Status: ACTIVE | Noted: 2022-01-25

## 2022-03-20 PROBLEM — E63.9 NUTRITIONAL DEFICIENCY: Status: ACTIVE | Noted: 2019-02-04

## 2022-04-18 ENCOUNTER — HOSPITAL ENCOUNTER (OUTPATIENT)
Dept: LAB | Age: 62
Discharge: HOME | End: 2022-04-18
Payer: OTHER GOVERNMENT

## 2022-04-18 ENCOUNTER — CLINICAL SUPPORT (OUTPATIENT)
Dept: FAMILY MEDICINE CLINIC | Age: 62
End: 2022-04-18
Payer: OTHER GOVERNMENT

## 2022-04-18 DIAGNOSIS — E63.9 NUTRITIONAL DEFICIENCY: Primary | ICD-10-CM

## 2022-04-18 DIAGNOSIS — Z98.84 HX OF GASTRIC BYPASS: ICD-10-CM

## 2022-04-18 DIAGNOSIS — E63.9 NUTRITIONAL DEFICIENCY: ICD-10-CM

## 2022-04-18 DIAGNOSIS — D50.8 OTHER IRON DEFICIENCY ANEMIA: ICD-10-CM

## 2022-04-18 DIAGNOSIS — K21.9 GASTROESOPHAGEAL REFLUX DISEASE WITHOUT ESOPHAGITIS: ICD-10-CM

## 2022-04-18 LAB
25(OH)D3 SERPL-MCNC: 26 NG/ML (ref 30–100)
ANION GAP SERPL CALC-SCNC: 5 MMOL/L (ref 3–18)
BASOPHILS # BLD: 0.1 K/UL (ref 0–0.1)
BASOPHILS NFR BLD: 1 % (ref 0–2)
BUN SERPL-MCNC: 20 MG/DL (ref 7–18)
BUN/CREAT SERPL: 28 (ref 12–20)
CALCIUM SERPL-MCNC: 9 MG/DL (ref 8.5–10.1)
CHLORIDE SERPL-SCNC: 111 MMOL/L (ref 100–111)
CO2 SERPL-SCNC: 27 MMOL/L (ref 21–32)
CREAT SERPL-MCNC: 0.72 MG/DL (ref 0.6–1.3)
DIFFERENTIAL METHOD BLD: ABNORMAL
EOSINOPHIL # BLD: 0.2 K/UL (ref 0–0.4)
EOSINOPHIL NFR BLD: 3 % (ref 0–5)
ERYTHROCYTE [DISTWIDTH] IN BLOOD BY AUTOMATED COUNT: 15.9 % (ref 11.6–14.5)
FERRITIN SERPL-MCNC: 28 NG/ML (ref 8–388)
GLUCOSE SERPL-MCNC: 89 MG/DL (ref 74–99)
HCT VFR BLD AUTO: 38.6 % (ref 35–45)
HGB BLD-MCNC: 11.7 G/DL (ref 12–16)
IMM GRANULOCYTES # BLD AUTO: 0 K/UL (ref 0–0.04)
IMM GRANULOCYTES NFR BLD AUTO: 0 % (ref 0–0.5)
IRON SATN MFR SERPL: 20 % (ref 20–50)
IRON SERPL-MCNC: 78 UG/DL (ref 50–175)
LYMPHOCYTES # BLD: 1.7 K/UL (ref 0.9–3.6)
LYMPHOCYTES NFR BLD: 27 % (ref 21–52)
MAGNESIUM SERPL-MCNC: 2.5 MG/DL (ref 1.6–2.6)
MCH RBC QN AUTO: 20.3 PG (ref 24–34)
MCHC RBC AUTO-ENTMCNC: 30.3 G/DL (ref 31–37)
MCV RBC AUTO: 67 FL (ref 78–100)
MONOCYTES # BLD: 0.4 K/UL (ref 0.05–1.2)
MONOCYTES NFR BLD: 6 % (ref 3–10)
NEUTS SEG # BLD: 3.9 K/UL (ref 1.8–8)
NEUTS SEG NFR BLD: 63 % (ref 40–73)
NRBC # BLD: 0 K/UL (ref 0–0.01)
NRBC BLD-RTO: 0 PER 100 WBC
PLATELET # BLD AUTO: 206 K/UL (ref 135–420)
PMV BLD AUTO: 12.3 FL (ref 9.2–11.8)
POTASSIUM SERPL-SCNC: 4.5 MMOL/L (ref 3.5–5.5)
RBC # BLD AUTO: 5.76 M/UL (ref 4.2–5.3)
RBC MORPH BLD: ABNORMAL
RBC MORPH BLD: ABNORMAL
SODIUM SERPL-SCNC: 143 MMOL/L (ref 136–145)
TIBC SERPL-MCNC: 387 UG/DL (ref 250–450)
WBC # BLD AUTO: 6.3 K/UL (ref 4.6–13.2)

## 2022-04-18 PROCEDURE — 82306 VITAMIN D 25 HYDROXY: CPT

## 2022-04-18 PROCEDURE — 36415 COLL VENOUS BLD VENIPUNCTURE: CPT | Performed by: FAMILY MEDICINE

## 2022-04-18 PROCEDURE — 83540 ASSAY OF IRON: CPT

## 2022-04-18 PROCEDURE — 85025 COMPLETE CBC W/AUTO DIFF WBC: CPT

## 2022-04-18 PROCEDURE — 83735 ASSAY OF MAGNESIUM: CPT

## 2022-04-18 PROCEDURE — 80048 BASIC METABOLIC PNL TOTAL CA: CPT

## 2022-04-18 PROCEDURE — 82728 ASSAY OF FERRITIN: CPT

## 2022-04-18 NOTE — PROGRESS NOTES
Patient here today for NV lab draw. Name and  verified venipuncture performed on patients right arm was successful patient tolerated well.

## 2022-05-10 ENCOUNTER — OFFICE VISIT (OUTPATIENT)
Dept: FAMILY MEDICINE CLINIC | Age: 62
End: 2022-05-10
Payer: OTHER GOVERNMENT

## 2022-05-10 VITALS
DIASTOLIC BLOOD PRESSURE: 78 MMHG | RESPIRATION RATE: 12 BRPM | OXYGEN SATURATION: 94 % | TEMPERATURE: 97.2 F | HEIGHT: 66 IN | WEIGHT: 256 LBS | HEART RATE: 63 BPM | SYSTOLIC BLOOD PRESSURE: 118 MMHG | BODY MASS INDEX: 41.14 KG/M2

## 2022-05-10 DIAGNOSIS — Z98.84 HX OF GASTRIC BYPASS: ICD-10-CM

## 2022-05-10 DIAGNOSIS — E55.9 VITAMIN D DEFICIENCY: ICD-10-CM

## 2022-05-10 DIAGNOSIS — F32.1 CURRENT MODERATE EPISODE OF MAJOR DEPRESSIVE DISORDER WITHOUT PRIOR EPISODE (HCC): Primary | ICD-10-CM

## 2022-05-10 DIAGNOSIS — D50.8 OTHER IRON DEFICIENCY ANEMIA: ICD-10-CM

## 2022-05-10 PROCEDURE — 96127 BRIEF EMOTIONAL/BEHAV ASSMT: CPT | Performed by: FAMILY MEDICINE

## 2022-05-10 PROCEDURE — 99214 OFFICE O/P EST MOD 30 MIN: CPT | Performed by: FAMILY MEDICINE

## 2022-05-10 RX ORDER — GLUCOSAMINE SULFATE 1500 MG
2000 POWDER IN PACKET (EA) ORAL DAILY
COMMUNITY
Start: 2022-05-10

## 2022-05-10 RX ORDER — SERTRALINE HYDROCHLORIDE 50 MG/1
50 TABLET, FILM COATED ORAL DAILY
Qty: 90 TABLET | Refills: 0 | Status: SHIPPED | OUTPATIENT
Start: 2022-05-10 | End: 2022-08-02

## 2022-05-10 NOTE — PROGRESS NOTES
Enmanuel Toro (: 1960) is a 59 y.o. female, established patient, here for:    ASSESSMENT/PLAN:  1. Current moderate episode of major depressive disorder without prior episode [facility])  Assessment & Plan:  Uncontrolled. Sertraline 50 mg. follow up 1 month   Orders:  -     sertraline (ZOLOFT) 50 mg tablet; Take 1 Tablet by mouth daily. , Normal, Disp-90 Tablet, R-0  -     BEHAV ASSMT W/SCORE & DOCD/STAND INSTRUMENT  2. Hx of gastric bypass  3. Other iron deficiency anemia  Assessment & Plan:  Well controlled, continue present management    4. Vitamin D deficiency  Assessment & Plan:   gastric bypass. Uncontrolled. Double replacement to 2000 units daily       Return in about 1 month (around 6/10/2022) for well woman with pap, PHQ 9 for depression follow up same day. SUBJECTIVE/OBJECTIVE:  HPI    follow up MDD - declined therapy last visit. \"It's got to stop. \" han. \"insane\" overreactions    Follow-up CAD - on rosuvastatin 10 mg. Asymptomatic     follow up nutritional deficiencies post gastric bypass. Back on replacement/vitamins  - Iron deficiency  -B12 deficiency  -vit d         Physical Exam  Constitutional:       General: She is not in acute distress. Appearance: She is well-developed. HENT:      Head: Normocephalic and atraumatic. Pulmonary:      Effort: Pulmonary effort is normal.   Neurological:      Mental Status: She is alert and oriented to person, place, and time. Psychiatric:         Behavior: Behavior normal.         Thought Content:  Thought content normal.         Judgment: Judgment normal.      Comments: Episodically tearful               -- Kathleen Saini MD

## 2022-05-10 NOTE — PROGRESS NOTES
Patient here today to be seen for her chronic condition follow up and lab review. 1. \"Have you been to the ER, urgent care clinic since your last visit? Hospitalized since your last visit? \" No    2. \"Have you seen or consulted any other health care providers outside of the [address] [place] [address] since your last visit? \" No  3. For patients aged 39-70: Has the patient had a colonoscopy / FIT/ Cologuard? Yes - no Care Gap present      If the patient is female:    4. For patients aged 41-77: Has the patient had a mammogram within the past 2 years? Yes - no Care Gap present      5. For patients aged 21-65: Has the patient had a pap smear?  No

## 2022-07-31 DIAGNOSIS — F32.1 CURRENT MODERATE EPISODE OF MAJOR DEPRESSIVE DISORDER WITHOUT PRIOR EPISODE (HCC): ICD-10-CM

## 2022-08-02 RX ORDER — SERTRALINE HYDROCHLORIDE 50 MG/1
TABLET, FILM COATED ORAL
Qty: 90 TABLET | Refills: 0 | Status: SHIPPED | OUTPATIENT
Start: 2022-08-02 | End: 2022-11-04

## 2022-08-26 ENCOUNTER — HOSPITAL ENCOUNTER (OUTPATIENT)
Dept: LAB | Age: 62
Discharge: HOME | End: 2022-08-26
Payer: OTHER GOVERNMENT

## 2022-08-26 ENCOUNTER — OFFICE VISIT (OUTPATIENT)
Dept: FAMILY MEDICINE CLINIC | Age: 62
End: 2022-08-26
Payer: OTHER GOVERNMENT

## 2022-08-26 VITALS
HEART RATE: 67 BPM | RESPIRATION RATE: 14 BRPM | BODY MASS INDEX: 40.34 KG/M2 | WEIGHT: 251 LBS | OXYGEN SATURATION: 96 % | HEIGHT: 66 IN | SYSTOLIC BLOOD PRESSURE: 114 MMHG | TEMPERATURE: 97.8 F | DIASTOLIC BLOOD PRESSURE: 60 MMHG

## 2022-08-26 DIAGNOSIS — Z12.4 SCREENING FOR CERVICAL CANCER: ICD-10-CM

## 2022-08-26 DIAGNOSIS — N89.8 VAGINAL LESION: ICD-10-CM

## 2022-08-26 DIAGNOSIS — Z01.419 WELL WOMAN EXAM: Primary | ICD-10-CM

## 2022-08-26 DIAGNOSIS — Z12.31 ENCOUNTER FOR SCREENING MAMMOGRAM FOR MALIGNANT NEOPLASM OF BREAST: ICD-10-CM

## 2022-08-26 PROCEDURE — 99396 PREV VISIT EST AGE 40-64: CPT | Performed by: FAMILY MEDICINE

## 2022-08-26 PROCEDURE — 87624 HPV HI-RISK TYP POOLED RSLT: CPT

## 2022-08-26 PROCEDURE — 88175 CYTOPATH C/V AUTO FLUID REDO: CPT

## 2022-08-26 NOTE — PATIENT INSTRUCTIONS
A Healthy Lifestyle: Care Instructions  Your Care Instructions     A healthy lifestyle can help you feel good, stay at a healthy weight, and have plenty of energy for both work and play. A healthy lifestyle is something you can share with your whole family. A healthy lifestyle also can lower your risk for serious health problems, such as high blood pressure, heart disease, and diabetes. You can follow a few steps listed below to improve your health and the health of your family. Follow-up care is a key part of your treatment and safety. Be sure to make and go to all appointments, and call your doctor if you are having problems. It's also a good idea to know your test results and keep a list of the medicines you take. How can you care for yourself at home? Do not eat too much sugar, fat, or fast foods. You can still have dessert and treats now and then. The goal is moderation. Start small to improve your eating habits. Pay attention to portion sizes, drink less juice and soda pop, and eat more fruits and vegetables. Eat a healthy amount of food. A 3-ounce serving of meat, for example, is about the size of a deck of cards. Fill the rest of your plate with vegetables and whole grains. Limit the amount of soda and sports drinks you have every day. Drink more water when you are thirsty. Eat plenty of fruits and vegetables every day. Have an apple or some carrot sticks as an afternoon snack instead of a candy bar. Try to have fruits and/or vegetables at every meal.  Make exercise part of your daily routine. You may want to start with simple activities, such as walking, bicycling, or slow swimming. Try to be active 30 to 60 minutes every day. You do not need to do all 30 to 60 minutes all at once. For example, you can exercise 3 times a day for 10 or 20 minutes. Moderate exercise is safe for most people, but it is always a good idea to talk to your doctor before starting an exercise program.  Keep moving.  Estephania Buckley the lawn, work in the garden, or clean your house. Take the stairs instead of the elevator at work. If you smoke, quit. People who smoke have an increased risk for heart attack, stroke, cancer, and other lung illnesses. Quitting is hard, but there are ways to boost your chance of quitting tobacco for good. Use nicotine gum, patches, or lozenges. Ask your doctor about stop-smoking programs and medicines. Keep trying. In addition to reducing your risk of diseases in the future, you will notice some benefits soon after you stop using tobacco. If you have shortness of breath or asthma symptoms, they will likely get better within a few weeks after you quit. Limit how much alcohol you drink. Moderate amounts of alcohol (up to 2 drinks a day for men, 1 drink a day for women) are okay. But drinking too much can lead to liver problems, high blood pressure, and other health problems. Family health  If you have a family, there are many things you can do together to improve your health. Eat meals together as a family as often as possible. Eat healthy foods. This includes fruits, vegetables, lean meats and dairy, and whole grains. Include your family in your fitness plan. Most people think of activities such as jogging or tennis as the way to fitness, but there are many ways you and your family can be more active. Anything that makes you breathe hard and gets your heart pumping is exercise. Here are some tips:  Walk to do errands or to take your child to school or the bus. Go for a family bike ride after dinner instead of watching TV. Where can you learn more? Go to http://www.gray.com/  Enter O631 in the search box to learn more about \"A Healthy Lifestyle: Care Instructions. \"  Current as of: June 16, 2021               Content Version: 13.2  © 2559-0687 [employer] Inc[employer].    Care instructions adapted under license by "[employer] (which disclaims liability or warranty for this information). If you have questions about a medical condition or this instruction, always ask your healthcare professional. [place] [address] any warranty or liability for your use of this information.     [employer]  [phone]

## 2022-08-26 NOTE — PROGRESS NOTES
Herlinda Valerio is a 59 y.o. female here for a preventive care visit    Assessment/Plan:   1. Well woman exam  2. Encounter for screening mammogram for malignant neoplasm of breast  -     JUDY 3D TATIANNA W MAMMO BI SCREENING INCL CAD; Future  3. Screening for cervical cancer  -     PAP IG, APTIMA HPV AND RFX 16/18,45 ([id number]); Future  4. Vaginal lesion  Comments:  unclear nature. referring  Orders:  -     REFERRAL TO OBSTETRICS AND GYNECOLOGY    Follow-up and Dispositions    Return in about 1 year (around 8/26/2023) for well woman, sooner for ongoing issues as previously planned. Follow-up and Dispositions    Return in about 1 year (around 8/26/2023) for well woman, sooner for ongoing issues as previously planned. Subjective:   No acute concerns    Guns locked  Helmet \"always\"  Safe at work and home  No LMP recorded. (Menstrual status: Menopause). Health Maintenance   Topic Date Due    Cervical cancer screen  10/01/2020    COVID-19 Vaccine (4 - Booster for Pfizer series) 03/11/2022    Flu Vaccine (1) 09/01/2022    Breast Cancer Screen Mammogram  11/11/2022    Lipid Screen  01/19/2023    Depression Monitoring  05/10/2023    DTaP/Tdap/Td series (2 - Td or Tdap) 03/06/2024    Colorectal Cancer Screening Combo  03/31/2028    Hepatitis C Screening  Completed    Bone Densitometry (Dexa) Screening  Completed    Shingrix Vaccine Age 50>  Completed    Pneumococcal 0-64 years  Aged Out         Pap hx: normal    JUDY Results (most recent):  Results from [place] encounter on 11/11/20    [facility] 3D TATIANNA W MAMMO BI SCREENING INCL CAD    Narrative  BILATERAL SCREENING DIGITAL MAMMOGRAM WITH CAD WITH DIGITAL BREAST TOMOSYNTHESIS  IMAGING/COMBINATION 2-D 3-D EXAM    CLINICAL HISTORY/INDICATION:  asymptomatic Screening    COMPARISON: mammogram 19 - 13    TECHNIQUE: 2-D and tomosynthesis 3-D digital mammograms were performed with CC  and MLO views obtained of both breasts.  CAD analysis was performed with the  computer-aided detection system. Any areas marked correlated with the mammogram.    Breast composition A: The breasts are almost entirely fatty. FINDINGS:    There are no suspicious masses, regions of distortion, nor suspicious  microcalcifications. Impression  IMPRESSION:    No suspicious finding for malignancy. Birads : BI-RADS one-negative mammogram  Management Recommendation: Annual screening mammogram.        Lab Results   Component Value Date/Time    Cholesterol, total 109 01/19/2022 08:31 AM    HDL Cholesterol 44 01/19/2022 08:31 AM    LDL, calculated 36.2 01/19/2022 08:31 AM    VLDL, calculated 28.8 01/19/2022 08:31 AM    Triglyceride 144 01/19/2022 08:31 AM    CHOL/HDL Ratio 2.5 01/19/2022 08:31 AM     Lab Results   Component Value Date/Time    Sodium 143 04/18/2022 11:35 AM    Potassium 4.5 04/18/2022 11:35 AM    Chloride 111 04/18/2022 11:35 AM    CO2 27 04/18/2022 11:35 AM    Anion gap 5 04/18/2022 11:35 AM    Glucose 89 04/18/2022 11:35 AM    BUN 20 (H) 04/18/2022 11:35 AM    Creatinine 0.72 04/18/2022 11:35 AM    BUN/Creatinine ratio 28 (H) 04/18/2022 11:35 AM    GFR est AA >60 04/18/2022 11:35 AM    GFR est non-AA >60 04/18/2022 11:35 AM    Calcium 9.0 04/18/2022 11:35 AM       DEXA Results (most recent):  Results from Hospital Encounter encounter on 11/11/20    DEXA BONE DENSITY STUDY AXIAL    Narrative  DEXA BONE DENSITOMETRY, CENTRAL    CLINICAL INDICATION/HISTORY: Post menopausal. 75-year-old female for baseline  study. Gastric bypass. TECHNIQUE: Using GE LUNAR Prodigy densitometer, bone density measurement was  performed in the lumbar spine, the proximal left and right femora. T Score  refers to standard deviations above or below average compared to a young adult  of the same sex. Z Score refers to standard deviations above or below average  compared to a patient of the same sex, age, race and weight. COMPARISON: None available.     FINDINGS:    Lumbar Spine Levels: L1-L4  Mean Bone Mineral Density (BMD):  0.981 g/cm2  T Score: -1.7  Z Score: -1.5    Left Total Proximal Femur BMD: 0.724 g/cm2  T Score: -2.2  Z Score: -2.1    Right Total Proximal Femur BMD: 0.713 g/cm2  T Score: -2.3  Z Score: -2.2    Left Femoral Neck BMD: 0.757  g/cm2  T Score: -2.0  Z Score: -1.5    Right Femoral Neck BMD: 0.675  g/cm2  T Score: -2.6  Z Score: -2.0    Impression  IMPRESSION:    BMD measures consistent with osteoporosis. Based upon current ISCD guidelines, the patient's overall diagnostic category,  selected using WHO criteria in postmenopausal women and males aged 48 and above,  is selected based upon the lowest T Score from among the lumbar spine, total  femur, femoral neck, (or distal third radius if measured). WHO Definition of Osteoporosis and Osteopenia on DXA (specified for post  menopausal  females):    Normal:                     T Score at or above -1 SD  Osteopenia:              T Score between -1 and -2.5 SD  Osteoporosis:          T Score at or below -2.5 SD    The risk of fracture approximately doubles for each 1 SD decrease in T Score. It is important to consider other factors in assessing a patient's risk of  fracture, including age, risk of falling/injury, history of fragility fracture,  family history of osteoporosis, smoking, low weight. Various fracture risk tools have been developed for adult patients and are  available online. For example, the FRAX tool developed by [facility] is widely used. Reference www.iscd.org. It is also important to note that DXA measures bone density but does not  distinguish among causes of decreased bone density, which include primary versus  secondary osteoporosis (such as metabolic bone disorders or possible effects of  medications) and also other conditions (such as osteomalacia).   Clinical  considerations should determine what additional evaluation may be warranted to  exclude secondary conditions in a patient with low bone density. Please note that reliable, valid comparisons can not be made between studies  which have been performed on different densitometers. If clinically warranted,  follow up study performed at this site would best permit assessment of trend for  possible change in bone mineral density over time in comparison to this study. Thank you for this referral.          ROS:  Feeling well. No dyspnea or chest pain on exertion. No abdominal pain, change in bowel habits, black or bloody stools. GYN ROS: no breast pain, new or enlarging lumps on self exam or nipple discharge, no vaginal bleeding, no discharge or pelvic pain. No neurological complaints. No new or evolving skin lesions    Objective:     Visit Vitals  /60 (BP 1 Location: Left upper arm, BP Patient Position: Sitting)   Pulse 67   Temp 97.8 °F (36.6 °C) (Tympanic)   Resp 14   Ht 5' 6\" (1.676 m)   Wt 251 lb (113.9 kg)   SpO2 96%   BMI 40.51 kg/m²        The patient appears well, alert, oriented x 3, in no distress. NCAT, sclera clear. Neck supple. No adenopathy or thyromegaly. Lungs are clear, good air entry, no wheezes, rhonchi or rales. S1 and S2 normal, no murmurs, regular rate and rhythm. Abdomen soft without tenderness, guarding, mass or organomegaly. Extremities show no edema. Neurological is without focal findings. BREAST EXAM: breasts appear normal, no suspicious masses, no skin or nipple changes or axillary nodes    PELVIC EXAM: VULVA: normal appearing vulva with no masses, tenderness or lesions, VAGINA: flat friable \"stuck on\" appearing lesion post vag wall, well defined, approx 1 cm diameter CERVIX: normal appearing cervix without discharge or lesions        Disclaimer: The patient understands our medical plan. Alternatives have been explained and offered. The risks, benefits and significant side effects of all medications have been reviewed. Anticipated time course and progression of condition reviewed.  All questions have been addressed. She is encouraged to employ the information provided in the after visit summary, which was reviewed. Where appropriate, she is instructed to call the clinic if she has not been notified either by phone or through [address] with the results of her tests or with an appointment plan for any referrals within 1 week(s). No news is not good news; it's no news. The patient  is to call if her condition worsens or fails to improve or if significant side effects are experienced. Aspects of this note may have been generated using voice recognition software. Despite editing, there may be unrecognized errors.

## 2022-08-26 NOTE — PROGRESS NOTES
Chief Complaint   Patient presents with    Well Woman     Patient here today for her well woman exam and pap. 1. \"Have you been to the ER, urgent care clinic since your last visit? Hospitalized since your last visit? \" No    2. \"Have you seen or consulted any other health care providers outside of the [address] [place] [address] since your last visit? \" No     3. For patients aged 39-70: Has the patient had a colonoscopy / FIT/ Cologuard? Yes - no Care Gap present      If the patient is female:    4. For patients aged 41-77: Has the patient had a mammogram within the past 2 years? Yes - no Care Gap present      5. For patients aged 21-65: Has the patient had a pap smear?  No

## 2022-09-07 NOTE — PROGRESS NOTES
Referral has been picked up by [facility], pap result has been faxed.

## 2022-10-30 DIAGNOSIS — F32.1 CURRENT MODERATE EPISODE OF MAJOR DEPRESSIVE DISORDER WITHOUT PRIOR EPISODE (HCC): ICD-10-CM

## 2022-11-02 NOTE — TELEPHONE ENCOUNTER
Called patient no answer left message asking her to call the office back in reference to refill request on her Sertraline told if she does not get a chance to call me back I can speak with her at her visit on 11/4.

## 2022-11-04 ENCOUNTER — OFFICE VISIT (OUTPATIENT)
Dept: FAMILY MEDICINE CLINIC | Age: 62
End: 2022-11-04
Payer: OTHER GOVERNMENT

## 2022-11-04 VITALS
RESPIRATION RATE: 10 BRPM | HEART RATE: 100 BPM | DIASTOLIC BLOOD PRESSURE: 78 MMHG | BODY MASS INDEX: 40.66 KG/M2 | OXYGEN SATURATION: 97 % | SYSTOLIC BLOOD PRESSURE: 132 MMHG | HEIGHT: 66 IN | WEIGHT: 253 LBS

## 2022-11-04 DIAGNOSIS — G57.91 NEUROPATHY OF RIGHT FOOT: Primary | ICD-10-CM

## 2022-11-04 DIAGNOSIS — M72.2 PLANTAR FASCIITIS OF RIGHT FOOT: ICD-10-CM

## 2022-11-04 DIAGNOSIS — Z23 ENCOUNTER FOR IMMUNIZATION: ICD-10-CM

## 2022-11-04 PROCEDURE — 99213 OFFICE O/P EST LOW 20 MIN: CPT | Performed by: FAMILY MEDICINE

## 2022-11-04 PROCEDURE — 90686 IIV4 VACC NO PRSV 0.5 ML IM: CPT | Performed by: FAMILY MEDICINE

## 2022-11-04 RX ORDER — SERTRALINE HYDROCHLORIDE 50 MG/1
TABLET, FILM COATED ORAL
Qty: 90 TABLET | Refills: 0 | Status: SHIPPED | OUTPATIENT
Start: 2022-11-04

## 2022-11-04 NOTE — PROGRESS NOTES
Kimberlee Villatoro (: 1960) is a 59 y.o. female, established patient, here for:    ASSESSMENT/PLAN:  1. Neuropathy of right foot  -     REFERRAL TO ORTHOPEDIC SURGERY  -     XR ANKLE RT MIN 3 V; Future  2. Plantar fasciitis of right foot  -     REFERRAL TO ORTHOPEDIC SURGERY  3. Encounter for immunization  -     INFLUENZA, FLUARIX, FLULAVAL, FLUZONE (AGE 6 MO+), AFLURIA(AGE 3Y+) IM, PF, 0.5 ML          SUBJECTIVE/OBJECTIVE:  HPI  right foot/heel pain on and off x 2 years. Persistent since , even when laying down. Pain worse when walking barefoot or in slippers. Lateral aspect feels like the skin has been scraped off. Plantar aspect painful. Describes pain as 'hot pokers'. Physical Exam  Constitutional:       General: She is not in acute distress. Appearance: She is well-developed. HENT:      Head: Normocephalic and atraumatic. Pulmonary:      Effort: Pulmonary effort is normal.   Musculoskeletal:      Right ankle: No swelling, deformity or ecchymosis. Right foot: No swelling or deformity. Comments: Exquisite reproduction of pain with light stroking posterior to lateral malleolus and lateral aspect right foot  Sharp pain reproduced with palpation along plantar fascia right foot   Neurological:      Mental Status: She is alert and oriented to person, place, and time. Psychiatric:         Behavior: Behavior normal.         Thought Content:  Thought content normal.         Judgment: Judgment normal.             -- Dulce Manuel MD

## 2022-11-04 NOTE — TELEPHONE ENCOUNTER
Patient here in the office and says she does need refills on her Sertraline and will schedule her depression f/u before she leaves today.

## 2022-11-04 NOTE — PROGRESS NOTES
Chief Complaint   Patient presents with    Foot Pain       Pt in office for right foot/heel pain that has been on and off x 2 years. States recently pain has been consistent, even when laying down. Pain worse when walking barefoot or in slippers. Describes pain as 'hot pokers'. 1. \"Have you been to the ER, urgent care clinic since your last visit? Hospitalized since your last visit? \" No    2. \"Have you seen or consulted any other health care providers outside of the [address] [place] [address] since your last visit? \" No     3. For patients aged 39-70: Has the patient had a colonoscopy / FIT/ Cologuard? Yes - no Care Gap present      If the patient is female:    4. For patients aged 41-77: Has the patient had a mammogram within the past 2 years? Yes - no Care Gap present      5. For patients aged 21-65: Has the patient had a pap smear?  Yes - no Care Gap present

## 2023-01-06 ENCOUNTER — HOSPITAL ENCOUNTER (OUTPATIENT)
Dept: LAB | Age: 63
End: 2023-01-06

## 2023-01-06 ENCOUNTER — OFFICE VISIT (OUTPATIENT)
Dept: FAMILY MEDICINE CLINIC | Age: 63
End: 2023-01-06
Payer: OTHER GOVERNMENT

## 2023-01-06 VITALS
SYSTOLIC BLOOD PRESSURE: 126 MMHG | WEIGHT: 250 LBS | HEART RATE: 62 BPM | HEIGHT: 66 IN | RESPIRATION RATE: 12 BRPM | DIASTOLIC BLOOD PRESSURE: 78 MMHG | BODY MASS INDEX: 40.18 KG/M2 | OXYGEN SATURATION: 98 % | TEMPERATURE: 97.1 F

## 2023-01-06 DIAGNOSIS — F32.1 CURRENT MODERATE EPISODE OF MAJOR DEPRESSIVE DISORDER WITHOUT PRIOR EPISODE (HCC): Primary | ICD-10-CM

## 2023-01-06 DIAGNOSIS — D50.8 OTHER IRON DEFICIENCY ANEMIA: ICD-10-CM

## 2023-01-06 DIAGNOSIS — I25.10 CORONARY ARTERY DISEASE INVOLVING NATIVE HEART WITHOUT ANGINA PECTORIS, UNSPECIFIED VESSEL OR LESION TYPE: ICD-10-CM

## 2023-01-06 DIAGNOSIS — E55.9 VITAMIN D DEFICIENCY: ICD-10-CM

## 2023-01-06 DIAGNOSIS — Z98.84 HX OF GASTRIC BYPASS: ICD-10-CM

## 2023-01-06 LAB
ALBUMIN SERPL-MCNC: 3.9 G/DL (ref 3.4–5)
ALBUMIN/GLOB SERPL: 1.3 (ref 0.8–1.7)
ALP SERPL-CCNC: 102 U/L (ref 45–117)
ALT SERPL-CCNC: 25 U/L (ref 13–56)
ANION GAP SERPL CALC-SCNC: 4 MMOL/L (ref 3–18)
AST SERPL-CCNC: 16 U/L (ref 10–38)
BILIRUB SERPL-MCNC: 0.7 MG/DL (ref 0.2–1)
BUN SERPL-MCNC: 23 MG/DL (ref 7–18)
BUN/CREAT SERPL: 27 (ref 12–20)
CALCIUM SERPL-MCNC: 9.6 MG/DL (ref 8.5–10.1)
CALCIUM SERPL-MCNC: 9.8 MG/DL (ref 8.5–10.1)
CHLORIDE SERPL-SCNC: 108 MMOL/L (ref 100–111)
CHOLEST SERPL-MCNC: 119 MG/DL
CO2 SERPL-SCNC: 29 MMOL/L (ref 21–32)
CREAT SERPL-MCNC: 0.85 MG/DL (ref 0.6–1.3)
FERRITIN SERPL-MCNC: 21 NG/ML (ref 8–388)
FOLATE SERPL-MCNC: 17.9 NG/ML (ref 3.1–17.5)
GLOBULIN SER CALC-MCNC: 3.1 G/DL (ref 2–4)
GLUCOSE SERPL-MCNC: 94 MG/DL (ref 74–99)
HDLC SERPL-MCNC: 45 MG/DL (ref 40–60)
HDLC SERPL: 2.6 (ref 0–5)
IRON SATN MFR SERPL: 15 % (ref 20–50)
IRON SERPL-MCNC: 63 UG/DL (ref 50–175)
LDLC SERPL CALC-MCNC: 50.2 MG/DL (ref 0–100)
LIPID PROFILE,FLP: NORMAL
POTASSIUM SERPL-SCNC: 4.9 MMOL/L (ref 3.5–5.5)
PROT SERPL-MCNC: 7 G/DL (ref 6.4–8.2)
PTH-INTACT SERPL-MCNC: 63.1 PG/ML (ref 18.4–88)
SODIUM SERPL-SCNC: 141 MMOL/L (ref 136–145)
TIBC SERPL-MCNC: 417 UG/DL (ref 250–450)
TRIGL SERPL-MCNC: 119 MG/DL (ref ?–150)
VIT B12 SERPL-MCNC: 331 PG/ML (ref 211–911)
VLDLC SERPL CALC-MCNC: 23.8 MG/DL

## 2023-01-06 PROCEDURE — 80053 COMPREHEN METABOLIC PANEL: CPT

## 2023-01-06 PROCEDURE — 82607 VITAMIN B-12: CPT

## 2023-01-06 PROCEDURE — 83970 ASSAY OF PARATHORMONE: CPT

## 2023-01-06 PROCEDURE — 82728 ASSAY OF FERRITIN: CPT

## 2023-01-06 PROCEDURE — 96127 BRIEF EMOTIONAL/BEHAV ASSMT: CPT | Performed by: FAMILY MEDICINE

## 2023-01-06 PROCEDURE — 80061 LIPID PANEL: CPT

## 2023-01-06 PROCEDURE — 83550 IRON BINDING TEST: CPT

## 2023-01-06 PROCEDURE — 99214 OFFICE O/P EST MOD 30 MIN: CPT | Performed by: FAMILY MEDICINE

## 2023-01-06 RX ORDER — SERTRALINE HYDROCHLORIDE 100 MG/1
50 TABLET, FILM COATED ORAL DAILY
Qty: 90 TABLET | Refills: 1 | Status: SHIPPED | OUTPATIENT
Start: 2023-01-06

## 2023-01-06 NOTE — ASSESSMENT & PLAN NOTE
Improving but not well controlled with PHQ 9 - 13. Increase sertraline to 100 mg. Touch base in context of follow up for other ongoing conditions.

## 2023-01-06 NOTE — PROGRESS NOTES
Mar Rubalcava (: 1960) is a 57 y.o. female, established patient, here for:    ASSESSMENT/PLAN:  1. Current moderate episode of major depressive disorder without prior episode ([facility] [address]  Assessment & Plan:  Improving but not well controlled with PHQ 9 - 13. Increase sertraline to 100 mg. Touch base in context of follow up for other ongoing conditions. Orders:  -     sertraline (ZOLOFT) 100 mg tablet; Take 0.5 Tablets by mouth daily. , Normal, Disp-90 Tablet, R-1  -     BEHAV ASSMT W/SCORE & DOCD/STAND INSTRUMENT  2. Hx of gastric bypass  -     METABOLIC PANEL, COMPREHENSIVE; Future  -     CBC WITH AUTOMATED DIFF; Future  -     FERRITIN; Future  -     IRON PROFILE; Future  -     VITAMIN B12 & FOLATE; Future  -     PTH INTACT; Future  3. Other iron deficiency anemia  -     FERRITIN; Future  -     IRON PROFILE; Future  4. Vitamin D deficiency  -     VITAMIN D, 25 HYDROXY; Future  5. Coronary artery disease involving native heart without angina pectoris, unspecified vessel or lesion type  -     LIPID PANEL; Future      Return for chronic medical conditions 1-2 weeks, (15). SUBJECTIVE/OBJECTIVE:  HPI   follow up MDD - Tears are less, \"rage monster\" is less. \"I'm close but not quite there. \" Fractured concentration is the worst. Can't sustain reading. Physical Exam  Constitutional:       General: She is not in acute distress. Appearance: She is well-developed. HENT:      Head: Normocephalic and atraumatic. Pulmonary:      Effort: Pulmonary effort is normal.   Neurological:      Mental Status: She is alert and oriented to person, place, and time. Psychiatric:         Mood and Affect: Mood is depressed. Behavior: Behavior normal.         Thought Content:  Thought content normal.         Judgment: Judgment normal.             -- Dony Morales MD

## 2023-01-06 NOTE — PROGRESS NOTES
Chief Complaint   Patient presents with    Depression     Patient here today to be seen for follow up, she says her current treatment is working well. 1. \"Have you been to the ER, urgent care clinic since your last visit? Hospitalized since your last visit? \" No    2. \"Have you seen or consulted any other health care providers outside of the [address] [place] [address] since your last visit? \" No     3. For patients aged 39-70: Has the patient had a colonoscopy / FIT/ Cologuard? Yes - no Care Gap present      If the patient is female:    4. For patients aged 41-77: Has the patient had a mammogram within the past 2 years? No      5. For patients aged 21-65: Has the patient had a pap smear?  Yes - no Care Gap present

## 2023-02-03 ENCOUNTER — OFFICE VISIT (OUTPATIENT)
Dept: FAMILY MEDICINE CLINIC | Age: 63
End: 2023-02-03
Payer: OTHER GOVERNMENT

## 2023-02-03 VITALS
HEART RATE: 59 BPM | HEIGHT: 66 IN | RESPIRATION RATE: 14 BRPM | OXYGEN SATURATION: 99 % | WEIGHT: 247 LBS | DIASTOLIC BLOOD PRESSURE: 68 MMHG | BODY MASS INDEX: 39.7 KG/M2 | TEMPERATURE: 97.5 F | SYSTOLIC BLOOD PRESSURE: 116 MMHG

## 2023-02-03 DIAGNOSIS — K21.9 GASTROESOPHAGEAL REFLUX DISEASE WITHOUT ESOPHAGITIS: ICD-10-CM

## 2023-02-03 DIAGNOSIS — J30.9 ALLERGIC CONJUNCTIVITIS OF BOTH EYES AND RHINITIS: ICD-10-CM

## 2023-02-03 DIAGNOSIS — I25.10 CORONARY ARTERY DISEASE INVOLVING NATIVE HEART WITHOUT ANGINA PECTORIS, UNSPECIFIED VESSEL OR LESION TYPE: Primary | ICD-10-CM

## 2023-02-03 DIAGNOSIS — E55.9 VITAMIN D DEFICIENCY: ICD-10-CM

## 2023-02-03 DIAGNOSIS — D50.8 OTHER IRON DEFICIENCY ANEMIA: ICD-10-CM

## 2023-02-03 DIAGNOSIS — F32.5 MAJOR DEPRESSIVE DISORDER WITH SINGLE EPISODE, IN FULL REMISSION (HCC): ICD-10-CM

## 2023-02-03 DIAGNOSIS — E66.01 MORBID OBESITY (HCC): ICD-10-CM

## 2023-02-03 DIAGNOSIS — M81.8 OTHER OSTEOPOROSIS WITHOUT CURRENT PATHOLOGICAL FRACTURE: ICD-10-CM

## 2023-02-03 DIAGNOSIS — H10.13 ALLERGIC CONJUNCTIVITIS OF BOTH EYES AND RHINITIS: ICD-10-CM

## 2023-02-03 DIAGNOSIS — Z98.84 HX OF GASTRIC BYPASS: ICD-10-CM

## 2023-02-03 DIAGNOSIS — E63.9 NUTRITIONAL DEFICIENCY: ICD-10-CM

## 2023-02-03 RX ORDER — ROSUVASTATIN CALCIUM 10 MG/1
10 TABLET, COATED ORAL DAILY
Qty: 90 TABLET | Refills: 3 | Status: SHIPPED | OUTPATIENT
Start: 2023-02-03

## 2023-02-03 RX ORDER — LORATADINE 10 MG/1
TABLET ORAL
Qty: 90 TABLET | Refills: 3 | Status: SHIPPED | OUTPATIENT
Start: 2023-02-03

## 2023-02-03 RX ORDER — PANTOPRAZOLE SODIUM 40 MG/1
80 TABLET, DELAYED RELEASE ORAL 2 TIMES DAILY
Qty: 360 TABLET | Refills: 3 | Status: SHIPPED | OUTPATIENT
Start: 2023-02-03

## 2023-02-03 NOTE — PROGRESS NOTES
Chief Complaint   Patient presents with    Coronary Artery Disease    Anemia    GERD    Depression       Patient here today to be seen for her chronic condition follow up. She is also asking for refills today. 1. \"Have you been to the ER, urgent care clinic since your last visit? Hospitalized since your last visit? \" No    2. \"Have you seen or consulted any other health care providers outside of the [address] [place] [address] since your last visit? \" No     3. For patients aged 39-70: Has the patient had a colonoscopy / FIT/ Cologuard? Yes - no Care Gap present      If the patient is female:    4. For patients aged 41-77: Has the patient had a mammogram within the past 2 years? No      5. For patients aged 21-65: Has the patient had a pap smear?  Yes - no Care Gap present

## 2023-02-03 NOTE — PROGRESS NOTES
Oleg Diana (: 1960) is a 57 y.o. female, established patient, here for:    ASSESSMENT/PLAN:  1. Coronary artery disease involving native heart without angina pectoris, unspecified vessel or lesion type  Assessment & Plan:  Asymptomatic. Continue medical management with rosuvastatin   Orders:  -     rosuvastatin (CRESTOR) 10 mg tablet; Take 1 Tablet by mouth daily. , Normal, Disp-90 Tablet, R-3  -     METABOLIC PANEL, COMPREHENSIVE; Future  2. Other osteoporosis without current pathological fracture  Assessment & Plan:  Uncontrolled. presumably due to gastric bypass status which precludes bisphosphonates. Referring   Orders:  -     REFERRAL TO RHEUMATOLOGY  3. Hx of gastric bypass  4. Other iron deficiency anemia  Assessment & Plan:  Uncontrolled with lapse in replacement. Resume. Orders:  -     CBC WITH AUTOMATED DIFF; Future  -     IRON PROFILE; Future  -     FERRITIN; Future  5. Nutritional deficiency  6. Vitamin D deficiency  -     VITAMIN D, 25 HYDROXY; Future  7. Gastroesophageal reflux disease without esophagitis  Assessment & Plan: With history of PUD. Well controlled on pantoprazole 40 mg bid. continue   Orders:  -     pantoprazole (PROTONIX) 40 mg tablet; Take 2 Tablets by mouth two (2) times a day., Normal, Disp-360 Tablet, R-3  8. Allergic conjunctivitis of both eyes and rhinitis  -     loratadine (CLARITIN) 10 mg tablet; TAKE 1 TABLET DAILY, Normal, Disp-90 Tablet, R-3  9. Major depressive disorder with single episode, in full remission [facility])  Assessment & Plan:  Well controlled, continue present management sertraline 100 mg. follow up q6 months   10. Morbid obesity ([address]      Return in about 6 months (around 8/3/2023) for chronic medical conditions, labs 1 week prior, (15). SUBJECTIVE/OBJECTIVE:  HPI    Follow-up CAD - on rosuvastatin 10 mg. Asymptomatic     follow up nutritional deficiencies post gastric bypass. - Iron deficiency: Reassuring endoscopies by Dr. Tonia Burgos.  Iron injections May-June 2018 timeframe. -B12 deficiency    Lapsed her once daily bariatric iron formulation and other vitamins. follow up GERD - pantoprazole 40 mg bid. asymptomatic  follow up AR - loratadine. Asymptomatic  follow up MDD - sertraline    Results for orders placed or performed during the hospital encounter of 59/40/27   METABOLIC PANEL, COMPREHENSIVE   Result Value Ref Range    Sodium 141 136 - 145 mmol/L    Potassium 4.9 3.5 - 5.5 mmol/L    Chloride 108 100 - 111 mmol/L    CO2 29 21 - 32 mmol/L    Anion gap 4 3.0 - 18 mmol/L    Glucose 94 74 - 99 mg/dL    BUN 23 (H) 7.0 - 18 MG/DL    Creatinine 0.85 0.6 - 1.3 MG/DL    BUN/Creatinine ratio 27 (H) 12 - 20      eGFR >60 >60 ml/min/1.73m2    Calcium 9.6 8.5 - 10.1 MG/DL    Bilirubin, total 0.7 0.2 - 1.0 MG/DL    ALT (SGPT) 25 13 - 56 U/L    AST (SGOT) 16 10 - 38 U/L    Alk.  phosphatase 102 45 - 117 U/L    Protein, total 7.0 6.4 - 8.2 g/dL    Albumin 3.9 3.4 - 5.0 g/dL    Globulin 3.1 2.0 - 4.0 g/dL    A-G Ratio 1.3 0.8 - 1.7     PTH INTACT   Result Value Ref Range    Calcium 9.8 8.5 - 10.1 MG/DL    PTH, Intact 63.1 18.4 - 88.0 pg/mL   LIPID PANEL   Result Value Ref Range    LIPID PROFILE          Cholesterol, total 119 <200 MG/DL    Triglyceride 119 <150 MG/DL    HDL Cholesterol 45 40 - 60 MG/DL    LDL, calculated 50.2 0 - 100 MG/DL    VLDL, calculated 23.8 MG/DL    CHOL/HDL Ratio 2.6 0 - 5.0     VITAMIN B12 & FOLATE   Result Value Ref Range    Vitamin B12 331 211 - 911 pg/mL    Folate 17.9 (H) 3.10 - 17.50 ng/mL   FERRITIN   Result Value Ref Range    Ferritin 21 8 - 388 NG/ML   IRON PROFILE   Result Value Ref Range    Iron 63 50 - 175 ug/dL    TIBC 417 250 - 450 ug/dL    Iron % saturation 15 (L) 20 - 50 %   DEXA Results (most recent):  Results from Hospital Encounter encounter on 11/11/20    DEXA BONE DENSITY STUDY AXIAL    Narrative  DEXA BONE DENSITOMETRY, CENTRAL    CLINICAL INDICATION/HISTORY: Post menopausal. 20-year-old female for baseline  study. Gastric bypass. TECHNIQUE: Using GE LUNAR Prodigy densitometer, bone density measurement was  performed in the lumbar spine, the proximal left and right femora. T Score  refers to standard deviations above or below average compared to a young adult  of the same sex. Z Score refers to standard deviations above or below average  compared to a patient of the same sex, age, race and weight. COMPARISON: None available. FINDINGS:    Lumbar Spine Levels: L1-L4  Mean Bone Mineral Density (BMD):  0.981 g/cm2  T Score: -1.7  Z Score: -1.5    Left Total Proximal Femur BMD: 0.724 g/cm2  T Score: -2.2  Z Score: -2.1    Right Total Proximal Femur BMD: 0.713 g/cm2  T Score: -2.3  Z Score: -2.2    Left Femoral Neck BMD: 0.757  g/cm2  T Score: -2.0  Z Score: -1.5    Right Femoral Neck BMD: 0.675  g/cm2  T Score: -2.6  Z Score: -2.0    Impression  IMPRESSION:    BMD measures consistent with osteoporosis. Based upon current ISCD guidelines, the patient's overall diagnostic category,  selected using WHO criteria in postmenopausal women and males aged 48 and above,  is selected based upon the lowest T Score from among the lumbar spine, total  femur, femoral neck, (or distal third radius if measured). WHO Definition of Osteoporosis and Osteopenia on DXA (specified for post  menopausal  females):    Normal:                     T Score at or above -1 SD  Osteopenia:              T Score between -1 and -2.5 SD  Osteoporosis:          T Score at or below -2.5 SD    The risk of fracture approximately doubles for each 1 SD decrease in T Score. It is important to consider other factors in assessing a patient's risk of  fracture, including age, risk of falling/injury, history of fragility fracture,  family history of osteoporosis, smoking, low weight. Various fracture risk tools have been developed for adult patients and are  available online.  For example, the FRAX tool developed by [facility] is widely used.  Reference www.iscd.org. It is also important to note that DXA measures bone density but does not  distinguish among causes of decreased bone density, which include primary versus  secondary osteoporosis (such as metabolic bone disorders or possible effects of  medications) and also other conditions (such as osteomalacia). Clinical  considerations should determine what additional evaluation may be warranted to  exclude secondary conditions in a patient with low bone density. Please note that reliable, valid comparisons can not be made between studies  which have been performed on different densitometers. If clinically warranted,  follow up study performed at this site would best permit assessment of trend for  possible change in bone mineral density over time in comparison to this study. Thank you for this referral.    Lab Results   Component Value Date/Time    Vitamin D 25-Hydroxy 26.0 (L) 04/18/2022 11:35 AM    Vitamin D 25-Hydroxy 29.7 (L) 09/09/2020 11:52 AM    Vitamin D 25-Hydroxy 25.9 (L) 07/29/2019 01:32 PM           Physical Exam  Constitutional:       General: She is not in acute distress. Appearance: She is well-developed. She is obese. She is not diaphoretic. HENT:      Head: Normocephalic and atraumatic. Cardiovascular:      Rate and Rhythm: Normal rate and regular rhythm. Heart sounds: Normal heart sounds. No murmur heard. No friction rub. No gallop. Pulmonary:      Effort: Pulmonary effort is normal. No respiratory distress. Breath sounds: Normal breath sounds. No wheezing, rhonchi or rales. Musculoskeletal:      Right lower leg: No edema. Left lower leg: No edema. Skin:     General: Skin is warm and dry. Neurological:      Mental Status: She is alert and oriented to person, place, and time. Psychiatric:         Behavior: Behavior normal.         Thought Content:  Thought content normal.         Judgment: Judgment normal.             -- [place]ia Childs Yanyc Shelton MD

## 2023-02-03 NOTE — ASSESSMENT & PLAN NOTE
Asymptomatic.  Continue medical management with rosuvastatin
Uncontrolled with lapse in replacement. Resume.
Uncontrolled. presumably due to gastric bypass status which precludes bisphosphonates.  Referring
Well controlled, continue present management sertraline 100 mg. follow up q6 months
With history of PUD. Well controlled on pantoprazole 40 mg bid.  continue
No

## 2023-02-14 DIAGNOSIS — M72.2 PLANTAR FASCIITIS OF RIGHT FOOT: Primary | ICD-10-CM

## 2023-02-14 DIAGNOSIS — G57.91 NEUROPATHY OF RIGHT FOOT: ICD-10-CM

## 2023-02-23 DIAGNOSIS — D50.8 OTHER IRON DEFICIENCY ANEMIA: Primary | ICD-10-CM

## 2023-02-23 DIAGNOSIS — I25.10 CORONARY ARTERY DISEASE INVOLVING NATIVE HEART WITHOUT ANGINA PECTORIS, UNSPECIFIED VESSEL OR LESION TYPE: Primary | ICD-10-CM

## 2023-02-23 DIAGNOSIS — E55.9 VITAMIN D DEFICIENCY: Primary | ICD-10-CM

## 2023-06-09 ENCOUNTER — OFFICE VISIT (OUTPATIENT)
Facility: CLINIC | Age: 63
End: 2023-06-09
Payer: OTHER GOVERNMENT

## 2023-06-09 ENCOUNTER — TELEPHONE (OUTPATIENT)
Facility: CLINIC | Age: 63
End: 2023-06-09

## 2023-06-09 VITALS
WEIGHT: 248 LBS | OXYGEN SATURATION: 97 % | HEIGHT: 66 IN | SYSTOLIC BLOOD PRESSURE: 140 MMHG | HEART RATE: 67 BPM | RESPIRATION RATE: 14 BRPM | TEMPERATURE: 97.6 F | BODY MASS INDEX: 39.86 KG/M2 | DIASTOLIC BLOOD PRESSURE: 60 MMHG

## 2023-06-09 DIAGNOSIS — R07.89 ATYPICAL CHEST PAIN: Primary | ICD-10-CM

## 2023-06-09 DIAGNOSIS — I25.10 CORONARY ARTERY DISEASE INVOLVING NATIVE CORONARY ARTERY OF NATIVE HEART WITHOUT ANGINA PECTORIS: ICD-10-CM

## 2023-06-09 PROCEDURE — 99214 OFFICE O/P EST MOD 30 MIN: CPT | Performed by: FAMILY MEDICINE

## 2023-06-09 SDOH — ECONOMIC STABILITY: HOUSING INSECURITY
IN THE LAST 12 MONTHS, WAS THERE A TIME WHEN YOU DID NOT HAVE A STEADY PLACE TO SLEEP OR SLEPT IN A SHELTER (INCLUDING NOW)?: NO

## 2023-06-09 SDOH — ECONOMIC STABILITY: FOOD INSECURITY: WITHIN THE PAST 12 MONTHS, YOU WORRIED THAT YOUR FOOD WOULD RUN OUT BEFORE YOU GOT MONEY TO BUY MORE.: NEVER TRUE

## 2023-06-09 SDOH — ECONOMIC STABILITY: FOOD INSECURITY: WITHIN THE PAST 12 MONTHS, THE FOOD YOU BOUGHT JUST DIDN'T LAST AND YOU DIDN'T HAVE MONEY TO GET MORE.: NEVER TRUE

## 2023-06-09 SDOH — ECONOMIC STABILITY: INCOME INSECURITY: HOW HARD IS IT FOR YOU TO PAY FOR THE VERY BASICS LIKE FOOD, HOUSING, MEDICAL CARE, AND HEATING?: NOT HARD AT ALL

## 2023-06-09 ASSESSMENT — PATIENT HEALTH QUESTIONNAIRE - PHQ9
SUM OF ALL RESPONSES TO PHQ QUESTIONS 1-9: 0
SUM OF ALL RESPONSES TO PHQ QUESTIONS 1-9: 0
1. LITTLE INTEREST OR PLEASURE IN DOING THINGS: 0
SUM OF ALL RESPONSES TO PHQ QUESTIONS 1-9: 0
SUM OF ALL RESPONSES TO PHQ QUESTIONS 1-9: 0
2. FEELING DOWN, DEPRESSED OR HOPELESS: 0
SUM OF ALL RESPONSES TO PHQ9 QUESTIONS 1 & 2: 0

## 2023-06-09 ASSESSMENT — ANXIETY QUESTIONNAIRES
2. NOT BEING ABLE TO STOP OR CONTROL WORRYING: 0
4. TROUBLE RELAXING: 0
1. FEELING NERVOUS, ANXIOUS, OR ON EDGE: 0
GAD7 TOTAL SCORE: 0
7. FEELING AFRAID AS IF SOMETHING AWFUL MIGHT HAPPEN: 0
3. WORRYING TOO MUCH ABOUT DIFFERENT THINGS: 0
5. BEING SO RESTLESS THAT IT IS HARD TO SIT STILL: 0
6. BECOMING EASILY ANNOYED OR IRRITABLE: 0

## 2023-06-09 NOTE — PROGRESS NOTES
agatagCvianey Thrasher (: 1960) is a 57 y.o. female, established patient, here for:    ASSESSMENT/PLAN:  1. Atypical chest pain  -     EKG 12 Lead, Inital; Future  -     XR CHEST (2 VIEWS); Future  -     CBC; Future  -     Comprehensive Metabolic Panel; Future  -     Troponin; Future  -     [facility], , Cardiology, [facility] ([facility] [address])  2. Coronary artery disease involving native coronary artery of native heart without angina pectoris  -     EKG 12 Lead, Inital; Future  -     XR CHEST (2 VIEWS); Future  -     [facility], [address], , DO, Cardiology, [facility]    2 weeks atypical chest pain, context known CAD on medical therapy. Current sxs not consistent with prior presentation. Pain is atypical but in context must pursue cardiac etiology. If cardiac, this becomes unstable angina    Ancillary studies this afternoon - if troponin elevated would direct to ER  Referring to cardiology colleagues    If determined not to be cardiac, would pursue pulm in light of past year of WHARTON post COVID infection. SUBJECTIVE/OBJECTIVE:  HPI    Intermittent chest pain x 2 weeks - sharp, substernal, approx 2-3x/week, lasts up to 30 minutes, no radiation, no nausea or diaphoresis, no clear trigger, resolves on it's own    No dietary or med changes  on PPI bid for GERd    history of CAD - still on asa and rosovastatin    Has noticed that since COVID last year, she's winded after playing with the dogs out back. No progression/recent change. No chest pain with that    Also in the past 2 weeks noticing episodic lightheadedness \"it almost feels like I'm high\". No vertigo (which she's experienced in the past). Episodes do NOT correlate with the chest pain. Physical Exam  Constitutional:       General: She is not in acute distress. Appearance: Normal appearance. HENT:      Head: Normocephalic and atraumatic.    Cardiovascular:      Rate and Rhythm: Normal rate and regular
for Agee Cal series) 01/06/2022    Breast cancer screen  11/11/2022   . Health Maintenance reviewed and discussed and ordered per Provider. Vaccines Due   Screenings Due       Donna Freeman is updated on all HM    1. \"Have you been to the ER, urgent care clinic since your last visit? Hospitalized since your last visit? \" No    2. \"Have you seen or consulted any other health care providers outside of the [address] [place] [address] since your last visit? \" No     3. For patients aged 39-70: Has the patient had a colonoscopy / FIT/ Cologuard? Yes     If the patient is female:    4. For patients aged 41-77: Has the patient had a mammogram within the past 2 years? No    5. For patients aged 21-65: Has the patient had a pap smear?  Yes
Declines

## 2023-06-12 ENCOUNTER — TELEPHONE (OUTPATIENT)
Facility: CLINIC | Age: 63
End: 2023-06-12

## 2023-08-10 ENCOUNTER — OFFICE VISIT (OUTPATIENT)
Age: 63
End: 2023-08-10
Payer: OTHER GOVERNMENT

## 2023-08-10 VITALS
HEART RATE: 86 BPM | HEIGHT: 66 IN | BODY MASS INDEX: 39.21 KG/M2 | OXYGEN SATURATION: 98 % | SYSTOLIC BLOOD PRESSURE: 132 MMHG | DIASTOLIC BLOOD PRESSURE: 82 MMHG | WEIGHT: 244 LBS

## 2023-08-10 DIAGNOSIS — I25.10 CORONARY ARTERY DISEASE INVOLVING NATIVE CORONARY ARTERY OF NATIVE HEART WITHOUT ANGINA PECTORIS: Primary | ICD-10-CM

## 2023-08-10 PROCEDURE — 99204 OFFICE O/P NEW MOD 45 MIN: CPT | Performed by: INTERNAL MEDICINE

## 2023-08-10 RX ORDER — NITROGLYCERIN 0.4 MG/1
0.4 TABLET SUBLINGUAL EVERY 5 MIN PRN
Qty: 25 TABLET | Refills: 3 | Status: SHIPPED | OUTPATIENT
Start: 2023-08-10

## 2023-08-10 ASSESSMENT — PATIENT HEALTH QUESTIONNAIRE - PHQ9
2. FEELING DOWN, DEPRESSED OR HOPELESS: 0
6. FEELING BAD ABOUT YOURSELF - OR THAT YOU ARE A FAILURE OR HAVE LET YOURSELF OR YOUR FAMILY DOWN: 0
SUM OF ALL RESPONSES TO PHQ QUESTIONS 1-9: 0
SUM OF ALL RESPONSES TO PHQ QUESTIONS 1-9: 0
SUM OF ALL RESPONSES TO PHQ9 QUESTIONS 1 & 2: 0
3. TROUBLE FALLING OR STAYING ASLEEP: 0
10. IF YOU CHECKED OFF ANY PROBLEMS, HOW DIFFICULT HAVE THESE PROBLEMS MADE IT FOR YOU TO DO YOUR WORK, TAKE CARE OF THINGS AT HOME, OR GET ALONG WITH OTHER PEOPLE: 0
8. MOVING OR SPEAKING SO SLOWLY THAT OTHER PEOPLE COULD HAVE NOTICED. OR THE OPPOSITE, BEING SO FIGETY OR RESTLESS THAT YOU HAVE BEEN MOVING AROUND A LOT MORE THAN USUAL: 0
SUM OF ALL RESPONSES TO PHQ QUESTIONS 1-9: 0
7. TROUBLE CONCENTRATING ON THINGS, SUCH AS READING THE NEWSPAPER OR WATCHING TELEVISION: 0
1. LITTLE INTEREST OR PLEASURE IN DOING THINGS: 0
4. FEELING TIRED OR HAVING LITTLE ENERGY: 0
SUM OF ALL RESPONSES TO PHQ QUESTIONS 1-9: 0
5. POOR APPETITE OR OVEREATING: 0
9. THOUGHTS THAT YOU WOULD BE BETTER OFF DEAD, OR OF HURTING YOURSELF: 0

## 2023-08-10 NOTE — PROGRESS NOTES
[address] [place]    Chief Complaint   Patient presents with    New Patient     Est care referred by pcp due to atypical chest pain , CAD involving native coronary artery of native without angina pectoris        HPI    Tigist Parkinson is a 57 y.o.  female with coronary artery disease here but her long-term cardiovascular care. As you know this patient had some chest pain several years ago and somewhere around 2016 had an abnormal exercise stress test that ultimately led to further testing (nuclear stress test?) and PN and ultimately C. I dont have these records but pt says she did have CAD but nothing bad enough to need PCI. She was started on Crestor 5 mg and ASA 81 mg at that time. She did well for some time and thinks maybe 2 yrs ago her Crestor was bumped to 10 mg. As you know, for about 2 months now, she has been having chest pain. It was not like the pain she had in the past. Woke up in the morning with it, maybe an intense pressure without any other assoc symptoms. She doesn't like to go to doctors but it was bad enough- she was worried and knows not normal for her. Past Medical History:   Diagnosis Date    Chemical pneumonia ([address]) unknown    Gall bladder disease unknown    Gastric bypass status for obesity unknown    Hernia unknown    Knee joint replacement status unknown    Muscle ache unknown    Pendulous abdomen unknown    Rosacea     Thalassemia trait     Ulcer unknown       Past Surgical History:   Procedure Laterality Date    CHOLECYSTECTOMY      GASTRIC BYPASS SURGERY      GYN      ORTHOPEDIC SURGERY Right 08/29/2017    Jailene Garcia MD: subacromial decompression , rotator cuff repair, biceps tenotomy    ORTHOPEDIC SURGERY      BILATERAL KNEE REPLACEMENT       Current Outpatient Medications   Medication Sig Dispense Refill    nitroGLYCERIN (NITROSTAT) 0.4 MG SL tablet Place 1 tablet under the tongue every 5 minutes as needed for Chest pain up to max of 3 total doses.  If

## 2023-08-10 NOTE — PROGRESS NOTES
Melly Lopez presents today for   Chief Complaint   Patient presents with    New Patient     Est care referred by pcp due to atypical chest pain , CAD involving native coronary artery of native without angina pectoris        Denae Ching Cea preferred language for health care discussion is english/other. Is someone accompanying this pt? no    Is the patient using any DME equipment during OV? no    Depression Screening:  Depression: Not at risk    PHQ-2 Score: 0        Learning Assessment:  Who is the primary learner? Patient    What is the preferred language for health care of the primary learner? ENGLISH    How does the primary learner prefer to learn new concepts? DEMONSTRATION    Answered By patient    Relationship to Learner SELF           Pt currently taking Anticoagulant therapy? no    Pt currently taking Antiplatelet therapy ? ASA 81 mg 1x daily       Coordination of Care:  1. Have you been to the ER, urgent care clinic since your last visit? Hospitalized since your last visit? no    2. Have you seen or consulted any other health care providers outside of the [address] [place] [address] since your last visit? Include any pap smears or colon screening.  no

## 2024-03-12 DIAGNOSIS — F32.5 MAJOR DEPRESSIVE DISORDER WITH SINGLE EPISODE, IN FULL REMISSION (HCC): Primary | ICD-10-CM

## 2024-03-12 DIAGNOSIS — I25.10 CORONARY ARTERY DISEASE INVOLVING NATIVE CORONARY ARTERY OF NATIVE HEART WITHOUT ANGINA PECTORIS: ICD-10-CM

## 2024-03-12 NOTE — TELEPHONE ENCOUNTER
Patient was last seen for her depression on 1/6/23 and is due for her follow up, called patient no answer left message letting her know I was calling to get her scheduled for her follow up and labs asked that she call the office back.

## 2024-03-14 NOTE — TELEPHONE ENCOUNTER
Called patient to schedule her follow up no answer left message stating she is due for follow up with Dr. Cullen and I was calling to get her scheduled asked that she call the office back at her convenience.

## 2024-03-15 RX ORDER — SERTRALINE HYDROCHLORIDE 100 MG/1
50 TABLET, FILM COATED ORAL DAILY
Qty: 45 TABLET | Refills: 0 | Status: SHIPPED | OUTPATIENT
Start: 2024-03-15

## 2024-03-15 RX ORDER — ROSUVASTATIN CALCIUM 10 MG/1
10 TABLET, COATED ORAL DAILY
Qty: 90 TABLET | Refills: 0 | Status: SHIPPED | OUTPATIENT
Start: 2024-03-15

## 2024-03-15 NOTE — TELEPHONE ENCOUNTER
Patient called the office returning a call to Adama. She has an upcoming appointment scheduled for 03/22/24, and is requesting a short term supply of Medication  rosuvastatin (CRESTOR) 10 MG tablet [[id number]]  rosuvastatin (CRESTOR) 10 MG tablet, and sertraline (ZOLOFT) 50 MG table. Please review and advise.

## 2024-04-16 ENCOUNTER — OFFICE VISIT (OUTPATIENT)
Facility: CLINIC | Age: 64
End: 2024-04-16
Payer: MEDICARE

## 2024-04-16 VITALS
SYSTOLIC BLOOD PRESSURE: 108 MMHG | BODY MASS INDEX: 40.11 KG/M2 | DIASTOLIC BLOOD PRESSURE: 64 MMHG | OXYGEN SATURATION: 99 % | WEIGHT: 249.6 LBS | HEART RATE: 58 BPM | TEMPERATURE: 98.2 F | HEIGHT: 66 IN

## 2024-04-16 DIAGNOSIS — E63.9 NUTRITIONAL DEFICIENCY: ICD-10-CM

## 2024-04-16 DIAGNOSIS — D50.8 OTHER IRON DEFICIENCY ANEMIA: ICD-10-CM

## 2024-04-16 DIAGNOSIS — E55.9 VITAMIN D DEFICIENCY: ICD-10-CM

## 2024-04-16 DIAGNOSIS — J30.9 ALLERGIC CONJUNCTIVITIS OF BOTH EYES AND RHINITIS: ICD-10-CM

## 2024-04-16 DIAGNOSIS — K21.9 GASTROESOPHAGEAL REFLUX DISEASE WITHOUT ESOPHAGITIS: ICD-10-CM

## 2024-04-16 DIAGNOSIS — H10.13 ALLERGIC CONJUNCTIVITIS OF BOTH EYES AND RHINITIS: ICD-10-CM

## 2024-04-16 DIAGNOSIS — I25.10 CORONARY ARTERY DISEASE INVOLVING NATIVE CORONARY ARTERY OF NATIVE HEART WITHOUT ANGINA PECTORIS: ICD-10-CM

## 2024-04-16 DIAGNOSIS — Z98.84 H/O GASTRIC BYPASS: ICD-10-CM

## 2024-04-16 DIAGNOSIS — F33.1 MODERATE EPISODE OF RECURRENT MAJOR DEPRESSIVE DISORDER (HCC): Primary | ICD-10-CM

## 2024-04-16 PROBLEM — F32.1 CURRENT MODERATE EPISODE OF MAJOR DEPRESSIVE DISORDER WITHOUT PRIOR EPISODE (HCC): Status: ACTIVE | Noted: 2022-01-25

## 2024-04-16 PROCEDURE — 3017F COLORECTAL CA SCREEN DOC REV: CPT | Performed by: FAMILY MEDICINE

## 2024-04-16 PROCEDURE — 1123F ACP DISCUSS/DSCN MKR DOCD: CPT | Performed by: FAMILY MEDICINE

## 2024-04-16 PROCEDURE — 1090F PRES/ABSN URINE INCON ASSESS: CPT | Performed by: FAMILY MEDICINE

## 2024-04-16 PROCEDURE — G8427 DOCREV CUR MEDS BY ELIG CLIN: HCPCS | Performed by: FAMILY MEDICINE

## 2024-04-16 PROCEDURE — 99214 OFFICE O/P EST MOD 30 MIN: CPT | Performed by: FAMILY MEDICINE

## 2024-04-16 PROCEDURE — G8417 CALC BMI ABV UP PARAM F/U: HCPCS | Performed by: FAMILY MEDICINE

## 2024-04-16 PROCEDURE — 96127 BRIEF EMOTIONAL/BEHAV ASSMT: CPT | Performed by: FAMILY MEDICINE

## 2024-04-16 PROCEDURE — G8399 PT W/DXA RESULTS DOCUMENT: HCPCS | Performed by: FAMILY MEDICINE

## 2024-04-16 PROCEDURE — 1036F TOBACCO NON-USER: CPT | Performed by: FAMILY MEDICINE

## 2024-04-16 RX ORDER — PANTOPRAZOLE SODIUM 40 MG/1
80 TABLET, DELAYED RELEASE ORAL 2 TIMES DAILY
Qty: 180 TABLET | Refills: 0 | Status: SHIPPED | OUTPATIENT
Start: 2024-04-16

## 2024-04-16 RX ORDER — SERTRALINE HYDROCHLORIDE 100 MG/1
100 TABLET, FILM COATED ORAL DAILY
Qty: 90 TABLET | Refills: 1 | Status: SHIPPED | OUTPATIENT
Start: 2024-04-16

## 2024-04-16 RX ORDER — LORATADINE 10 MG/1
10 TABLET ORAL DAILY
Qty: 90 TABLET | Refills: 3 | Status: SHIPPED | OUTPATIENT
Start: 2024-04-16

## 2024-04-16 RX ORDER — FERROUS SULFATE 325(65) MG
325 TABLET ORAL
COMMUNITY

## 2024-04-16 RX ORDER — ROSUVASTATIN CALCIUM 10 MG/1
10 TABLET, COATED ORAL DAILY
Qty: 90 TABLET | Refills: 0 | Status: SHIPPED | OUTPATIENT
Start: 2024-04-16

## 2024-04-16 ASSESSMENT — PATIENT HEALTH QUESTIONNAIRE - PHQ9
9. THOUGHTS THAT YOU WOULD BE BETTER OFF DEAD, OR OF HURTING YOURSELF: NOT AT ALL
SUM OF ALL RESPONSES TO PHQ QUESTIONS 1-9: 14
SUM OF ALL RESPONSES TO PHQ9 QUESTIONS 1 & 2: 3
SUM OF ALL RESPONSES TO PHQ QUESTIONS 1-9: 14
6. FEELING BAD ABOUT YOURSELF - OR THAT YOU ARE A FAILURE OR HAVE LET YOURSELF OR YOUR FAMILY DOWN: NEARLY EVERY DAY
5. POOR APPETITE OR OVEREATING: NEARLY EVERY DAY
2. FEELING DOWN, DEPRESSED OR HOPELESS: NEARLY EVERY DAY
1. LITTLE INTEREST OR PLEASURE IN DOING THINGS: NOT AT ALL
3. TROUBLE FALLING OR STAYING ASLEEP: MORE THAN HALF THE DAYS
8. MOVING OR SPEAKING SO SLOWLY THAT OTHER PEOPLE COULD HAVE NOTICED. OR THE OPPOSITE, BEING SO FIGETY OR RESTLESS THAT YOU HAVE BEEN MOVING AROUND A LOT MORE THAN USUAL: NOT AT ALL
10. IF YOU CHECKED OFF ANY PROBLEMS, HOW DIFFICULT HAVE THESE PROBLEMS MADE IT FOR YOU TO DO YOUR WORK, TAKE CARE OF THINGS AT HOME, OR GET ALONG WITH OTHER PEOPLE: SOMEWHAT DIFFICULT
SUM OF ALL RESPONSES TO PHQ QUESTIONS 1-9: 14
4. FEELING TIRED OR HAVING LITTLE ENERGY: NEARLY EVERY DAY
7. TROUBLE CONCENTRATING ON THINGS, SUCH AS READING THE NEWSPAPER OR WATCHING TELEVISION: NOT AT ALL
SUM OF ALL RESPONSES TO PHQ QUESTIONS 1-9: 14

## 2024-04-16 NOTE — ASSESSMENT & PLAN NOTE
Uncontrolled.  Discussed relationship between coronary artery disease and MDD.  Low risk self-harm.  Increase sertraline to 100 mg.  Referring for therapy.

## 2024-04-16 NOTE — PROGRESS NOTES
Chief Complaint   Patient presents with    Coronary Artery Disease    Gastroesophageal Reflux    Depression    Anemia    Vitamin D def     \"Have you been to the ER, urgent care clinic since your last visit?  Hospitalized since your last visit?\"    NO    “Have you seen or consulted any other health care providers outside of [facility] since your last visit?”    NO    Have you had a mammogram?”   NO    Date of last Mammogram: 11/11/2020             Click Here for Release of Records Request

## 2024-04-16 NOTE — PROGRESS NOTES
Denae Sorto (: 1960) is a 63 y.o. female, established patient, here for:    ASSESSMENT/PLAN:  1. Moderate episode of recurrent major depressive disorder (HCC)  Assessment & Plan:  Uncontrolled.  Discussed relationship between coronary artery disease and MDD.  Low risk self-harm.  Increase sertraline to 100 mg.  Referring for therapy.    Orders:  -     sertraline (ZOLOFT) 100 MG tablet; Take 1 tablet by mouth daily, Disp-90 tablet, R-1Normal  -     TSH with Reflex; Future  -     External Referral To Psychology  -     [place] BEHAV ASSMT W/SCORE & DOCD/STAND INSTRUMENT  2. Coronary artery disease involving native coronary artery of native heart without angina pectoris  -     rosuvastatin (CRESTOR) 10 MG tablet; Take 1 tablet by mouth daily, Disp-90 tablet, R-0Normal  -     Comprehensive Metabolic Panel; Future  -     CBC; Future  -     Lipid Panel; Future  3. H/O gastric bypass  -     Basic Metabolic Panel; Future  -     CBC with Auto Differential; Future  -     Vitamin D 25 Hydroxy; Future  -     Ferritin; Future  -     Iron and TIBC; Future  -     PTH, Intact; Future  -     Vitamin B1, Whole Blood; Future  -     Vitamin B12; Future  4. Other iron deficiency anemia  -     CBC; Future  5. Nutritional deficiency  6. Vitamin D deficiency  -     Vitamin D 25 Hydroxy; Future  7. Allergic conjunctivitis of both eyes and rhinitis  Assessment & Plan:  Uncontrolled.  Historically well-controlled with loratadine.  Resume   Orders:  -     loratadine (CLARITIN) 10 MG tablet; Take 1 tablet by mouth daily, Disp-90 tablet, R-3Normal  8. Gastroesophageal reflux disease without esophagitis  -     pantoprazole (PROTONIX) 40 MG tablet; Take 2 tablets by mouth 2 times daily, Disp-180 tablet, R-0Normal  -     Magnesium; Future      Follow-up and Dispositions    Return in about 3 weeks (around 2024) for chronic medical conditions, (30).            SUBJECTIVE/OBJECTIVE:  Chief Complaint   Patient presents with    Coronary

## 2024-04-19 ENCOUNTER — HOSPITAL ENCOUNTER (OUTPATIENT)
Facility: HOSPITAL | Age: 64
End: 2024-04-19
Payer: MEDICARE

## 2024-04-19 DIAGNOSIS — F33.1 MODERATE EPISODE OF RECURRENT MAJOR DEPRESSIVE DISORDER (HCC): ICD-10-CM

## 2024-04-19 DIAGNOSIS — E55.9 VITAMIN D DEFICIENCY: ICD-10-CM

## 2024-04-19 DIAGNOSIS — D50.8 OTHER IRON DEFICIENCY ANEMIA: ICD-10-CM

## 2024-04-19 DIAGNOSIS — K21.9 GASTROESOPHAGEAL REFLUX DISEASE WITHOUT ESOPHAGITIS: ICD-10-CM

## 2024-04-19 DIAGNOSIS — Z98.84 H/O GASTRIC BYPASS: ICD-10-CM

## 2024-04-19 DIAGNOSIS — I25.10 CORONARY ARTERY DISEASE INVOLVING NATIVE CORONARY ARTERY OF NATIVE HEART WITHOUT ANGINA PECTORIS: ICD-10-CM

## 2024-04-19 LAB
25(OH)D3 SERPL-MCNC: 38.2 NG/ML (ref 30–100)
ALBUMIN SERPL-MCNC: 4.2 G/DL (ref 3.4–5)
ALBUMIN/GLOB SERPL: 1.4 (ref 0.8–1.7)
ALP SERPL-CCNC: 99 U/L (ref 45–117)
ALT SERPL-CCNC: 21 U/L (ref 13–56)
ANION GAP SERPL CALC-SCNC: 6 MMOL/L (ref 3–18)
AST SERPL-CCNC: 10 U/L (ref 10–38)
BASOPHILS # BLD: 0 K/UL (ref 0–0.1)
BASOPHILS NFR BLD: 1 % (ref 0–2)
BILIRUB SERPL-MCNC: 0.5 MG/DL (ref 0.2–1)
BUN SERPL-MCNC: 28 MG/DL (ref 7–18)
BUN/CREAT SERPL: 35 (ref 12–20)
CALCIUM SERPL-MCNC: 10 MG/DL (ref 8.5–10.1)
CALCIUM SERPL-MCNC: 9.8 MG/DL (ref 8.5–10.1)
CHLORIDE SERPL-SCNC: 103 MMOL/L (ref 100–111)
CHOLEST SERPL-MCNC: 122 MG/DL
CO2 SERPL-SCNC: 28 MMOL/L (ref 21–32)
CREAT SERPL-MCNC: 0.79 MG/DL (ref 0.6–1.3)
DIFFERENTIAL METHOD BLD: ABNORMAL
EOSINOPHIL # BLD: 0.2 K/UL (ref 0–0.4)
EOSINOPHIL NFR BLD: 2 % (ref 0–5)
ERYTHROCYTE [DISTWIDTH] IN BLOOD BY AUTOMATED COUNT: 16.8 % (ref 11.6–14.5)
FERRITIN SERPL-MCNC: 19 NG/ML (ref 8–388)
GLOBULIN SER CALC-MCNC: 3 G/DL (ref 2–4)
GLUCOSE SERPL-MCNC: 83 MG/DL (ref 74–99)
HCT VFR BLD AUTO: 41.5 % (ref 35–45)
HDLC SERPL-MCNC: 54 MG/DL (ref 40–60)
HDLC SERPL: 2.3 (ref 0–5)
HGB BLD-MCNC: 12.7 G/DL (ref 12–16)
IMM GRANULOCYTES # BLD AUTO: 0 K/UL (ref 0–0.04)
IMM GRANULOCYTES NFR BLD AUTO: 0 % (ref 0–0.5)
IRON SATN MFR SERPL: 11 % (ref 20–50)
IRON SERPL-MCNC: 51 UG/DL (ref 50–175)
LDLC SERPL CALC-MCNC: 49 MG/DL (ref 0–100)
LIPID PANEL: NORMAL
LYMPHOCYTES # BLD: 2.3 K/UL (ref 0.9–3.6)
LYMPHOCYTES NFR BLD: 26 % (ref 21–52)
MAGNESIUM SERPL-MCNC: 2.5 MG/DL (ref 1.6–2.6)
MCH RBC QN AUTO: 20.8 PG (ref 24–34)
MCHC RBC AUTO-ENTMCNC: 30.6 G/DL (ref 31–37)
MCV RBC AUTO: 67.8 FL (ref 78–100)
MONOCYTES # BLD: 0.4 K/UL (ref 0.05–1.2)
MONOCYTES NFR BLD: 5 % (ref 3–10)
NEUTS SEG # BLD: 5.9 K/UL (ref 1.8–8)
NEUTS SEG NFR BLD: 67 % (ref 40–73)
NRBC # BLD: 0 K/UL (ref 0–0.01)
NRBC BLD-RTO: 0 PER 100 WBC
PLATELET # BLD AUTO: 254 K/UL (ref 135–420)
PMV BLD AUTO: 11.5 FL (ref 9.2–11.8)
POTASSIUM SERPL-SCNC: 4 MMOL/L (ref 3.5–5.5)
PROT SERPL-MCNC: 7.2 G/DL (ref 6.4–8.2)
PTH-INTACT SERPL-MCNC: 74.1 PG/ML (ref 18.4–88)
RBC # BLD AUTO: 6.12 M/UL (ref 4.2–5.3)
SODIUM SERPL-SCNC: 137 MMOL/L (ref 136–145)
TIBC SERPL-MCNC: 449 UG/DL (ref 250–450)
TRIGL SERPL-MCNC: 95 MG/DL
TSH SERPL-ACNC: 2.08 UIU/ML (ref 0.36–3.74)
VIT B12 SERPL-MCNC: 220 PG/ML (ref 211–911)
VLDLC SERPL CALC-MCNC: 19 MG/DL
WBC # BLD AUTO: 8.8 K/UL (ref 4.6–13.2)

## 2024-04-19 PROCEDURE — 83550 IRON BINDING TEST: CPT

## 2024-04-19 PROCEDURE — 80061 LIPID PANEL: CPT

## 2024-04-19 PROCEDURE — 82306 VITAMIN D 25 HYDROXY: CPT

## 2024-04-19 PROCEDURE — 80053 COMPREHEN METABOLIC PANEL: CPT

## 2024-04-19 PROCEDURE — 36415 COLL VENOUS BLD VENIPUNCTURE: CPT

## 2024-04-19 PROCEDURE — 85025 COMPLETE CBC W/AUTO DIFF WBC: CPT

## 2024-04-19 PROCEDURE — 83540 ASSAY OF IRON: CPT

## 2024-04-19 PROCEDURE — 82607 VITAMIN B-12: CPT

## 2024-04-19 PROCEDURE — 83735 ASSAY OF MAGNESIUM: CPT

## 2024-04-19 PROCEDURE — 83970 ASSAY OF PARATHORMONE: CPT

## 2024-04-19 PROCEDURE — 84443 ASSAY THYROID STIM HORMONE: CPT

## 2024-04-19 PROCEDURE — 84425 ASSAY OF VITAMIN B-1: CPT

## 2024-04-19 PROCEDURE — 82728 ASSAY OF FERRITIN: CPT

## 2024-04-23 LAB — VIT B1 BLD-SCNC: 114.3 NMOL/L (ref 66.5–200)

## 2024-05-07 ENCOUNTER — OFFICE VISIT (OUTPATIENT)
Facility: CLINIC | Age: 64
End: 2024-05-07
Payer: MEDICARE

## 2024-05-07 VITALS
HEART RATE: 65 BPM | BODY MASS INDEX: 40.47 KG/M2 | SYSTOLIC BLOOD PRESSURE: 124 MMHG | WEIGHT: 251.8 LBS | DIASTOLIC BLOOD PRESSURE: 84 MMHG | OXYGEN SATURATION: 97 % | HEIGHT: 66 IN | TEMPERATURE: 98.4 F

## 2024-05-07 DIAGNOSIS — D50.8 OTHER IRON DEFICIENCY ANEMIA: ICD-10-CM

## 2024-05-07 DIAGNOSIS — Z23 ENCOUNTER FOR IMMUNIZATION: ICD-10-CM

## 2024-05-07 DIAGNOSIS — Z87.11 HX OF PEPTIC ULCER: ICD-10-CM

## 2024-05-07 DIAGNOSIS — E55.9 VITAMIN D DEFICIENCY: ICD-10-CM

## 2024-05-07 DIAGNOSIS — E63.9 NUTRITIONAL DEFICIENCY: ICD-10-CM

## 2024-05-07 DIAGNOSIS — K21.9 GASTROESOPHAGEAL REFLUX DISEASE WITHOUT ESOPHAGITIS: ICD-10-CM

## 2024-05-07 DIAGNOSIS — F33.1 MODERATE EPISODE OF RECURRENT MAJOR DEPRESSIVE DISORDER (HCC): ICD-10-CM

## 2024-05-07 DIAGNOSIS — Z98.84 HX OF GASTRIC BYPASS: ICD-10-CM

## 2024-05-07 DIAGNOSIS — I25.10 CORONARY ARTERY DISEASE INVOLVING NATIVE CORONARY ARTERY OF NATIVE HEART WITHOUT ANGINA PECTORIS: Primary | ICD-10-CM

## 2024-05-07 DIAGNOSIS — Z12.31 ENCOUNTER FOR SCREENING MAMMOGRAM FOR BREAST CANCER: ICD-10-CM

## 2024-05-07 DIAGNOSIS — M81.8 OTHER OSTEOPOROSIS WITHOUT CURRENT PATHOLOGICAL FRACTURE: ICD-10-CM

## 2024-05-07 PROCEDURE — G8399 PT W/DXA RESULTS DOCUMENT: HCPCS | Performed by: FAMILY MEDICINE

## 2024-05-07 PROCEDURE — 3017F COLORECTAL CA SCREEN DOC REV: CPT | Performed by: FAMILY MEDICINE

## 2024-05-07 PROCEDURE — G8417 CALC BMI ABV UP PARAM F/U: HCPCS | Performed by: FAMILY MEDICINE

## 2024-05-07 PROCEDURE — G8427 DOCREV CUR MEDS BY ELIG CLIN: HCPCS | Performed by: FAMILY MEDICINE

## 2024-05-07 PROCEDURE — 1090F PRES/ABSN URINE INCON ASSESS: CPT | Performed by: FAMILY MEDICINE

## 2024-05-07 PROCEDURE — 1123F ACP DISCUSS/DSCN MKR DOCD: CPT | Performed by: FAMILY MEDICINE

## 2024-05-07 PROCEDURE — 99214 OFFICE O/P EST MOD 30 MIN: CPT | Performed by: FAMILY MEDICINE

## 2024-05-07 PROCEDURE — G0009 ADMIN PNEUMOCOCCAL VACCINE: HCPCS | Performed by: FAMILY MEDICINE

## 2024-05-07 PROCEDURE — 90677 PCV20 VACCINE IM: CPT | Performed by: FAMILY MEDICINE

## 2024-05-07 PROCEDURE — 1036F TOBACCO NON-USER: CPT | Performed by: FAMILY MEDICINE

## 2024-05-07 RX ORDER — BUTYROSPERMUM PARKII(SHEA BUTTER), SIMMONDSIA CHINENSIS (JOJOBA) SEED OIL, ALOE BARBADENSIS LEAF EXTRACT .01; 1; 3.5 G/100G; G/100G; G/100G
5000 LIQUID TOPICAL DAILY
Refills: 0 | COMMUNITY
Start: 2024-05-07

## 2024-05-07 RX ORDER — PANTOPRAZOLE SODIUM 40 MG/1
80 TABLET, DELAYED RELEASE ORAL 2 TIMES DAILY
Qty: 360 TABLET | Refills: 3 | Status: SHIPPED | OUTPATIENT
Start: 2024-05-07

## 2024-05-07 ASSESSMENT — ENCOUNTER SYMPTOMS
SHORTNESS OF BREATH: 0
CHEST TIGHTNESS: 0

## 2024-05-07 NOTE — ASSESSMENT & PLAN NOTE
Given the patient's history of ulcer and bleeding, it is deemed prudent for the patient to continue her proton pump inhibitor indefinitely.  Continue protonix 80 mg bid.

## 2024-05-07 NOTE — ASSESSMENT & PLAN NOTE
history of gastric bypass. Uncontrolled with sat 11%. The patient's iron dosage will be increased to twice daily, and if well-tolerated, the dosage can be increased to three times daily. A reassessment of her iron levels will be conducted in approximately 3 to 6 months. Should the patient experience intolerance to the increased dosage of iron 2 to 3 times daily or if her iron levels remain  uncontrolled, iron infusion therapy will be considered.

## 2024-05-07 NOTE — PROGRESS NOTES
Chief Complaint   Patient presents with    Coronary Artery Disease    Depression    Iron Def    Gastroesophageal Reflux    Vitamin D def           \"Have you been to the ER, urgent care clinic since your last visit?  Hospitalized since your last visit?\"    NO    “Have you seen or consulted any other health care providers outside of [facility] since your last visit?”    NO    Have you had a mammogram?”   NO     Date of last Mammogram: 11/11/2020             Click Here for Release of Records Request

## 2024-05-07 NOTE — ASSESSMENT & PLAN NOTE
Improving, not yet well controlled. Continue present management sertraline 100 mg. Follow up 3 months

## 2024-05-07 NOTE — ASSESSMENT & PLAN NOTE
Asymptomatic. The patient's blood pressure is well-regulated. Continue high potency statin, targeting LDL <70, and ASA.

## 2024-05-07 NOTE — ASSESSMENT & PLAN NOTE
The patient's vitamin B1 is normal. Vitamin B12 levels very low normal range. The patient's cyanocobalamin dosage will be increased to 1000.

## 2024-05-07 NOTE — PROGRESS NOTES
Denae Sorto (: 1960) is a 63 y.o. female, established patient, here for:    ASSESSMENT/PLAN:  1. Coronary artery disease involving native coronary artery of native heart without angina pectoris  Assessment & Plan:  Asymptomatic. The patient's blood pressure is well-regulated. Continue high potency statin, targeting LDL <70, and ASA.   2. Gastroesophageal reflux disease without esophagitis  Assessment & Plan:  Given the patient's history of ulcer and bleeding, it is deemed prudent for the patient to continue her proton pump inhibitor indefinitely.  Continue protonix 80 mg bid.  Orders:  -     pantoprazole (PROTONIX) 40 MG tablet; Take 2 tablets by mouth 2 times daily, Disp-360 tablet, R-3Normal  3. Hx of gastric bypass  4. Hx of peptic ulcer  5. Other iron deficiency anemia  Assessment & Plan:  history of gastric bypass. Uncontrolled with sat 11%. The patient's iron dosage will be increased to twice daily, and if well-tolerated, the dosage can be increased to three times daily. A reassessment of her iron levels will be conducted in approximately 3 to 6 months. Should the patient experience intolerance to the increased dosage of iron 2 to 3 times daily or if her iron levels remain  uncontrolled, iron infusion therapy will be considered.   Orders:  -     Iron and TIBC; Future  -     Ferritin; Future  6. Nutritional deficiency  Assessment & Plan:  The patient's vitamin B1 is normal. Vitamin B12 levels very low normal range. The patient's cyanocobalamin dosage will be increased to 1000.   Orders:  -     cyanocobalamin 1000 MCG tablet; Take 0.5 tablets by mouth dailyOTC  7. Other osteoporosis without current pathological fracture  -     Vitamin D 25 Hydroxy; Future  8. Moderate episode of recurrent major depressive disorder (HCC)  Assessment & Plan:  Improving, not yet well controlled. Continue present management sertraline 100 mg. Follow up 3 months   9. Vitamin D deficiency  Assessment &

## 2024-06-21 ENCOUNTER — TELEPHONE (OUTPATIENT)
Facility: CLINIC | Age: 64
End: 2024-06-21

## 2024-06-27 ENCOUNTER — TELEPHONE (OUTPATIENT)
Facility: HOSPITAL | Age: 64
End: 2024-06-27

## 2024-06-27 NOTE — TELEPHONE ENCOUNTER
I called  Denae Sorto, to assist her  in scheduling a Mammogram.  A voice mail message was left for this patient to return my call  at [phone] ; to assist her with scheduling her  Breast Cancer screening .    Nevin Larson LPN   Panel Manager

## 2024-07-24 ENCOUNTER — TELEPHONE (OUTPATIENT)
Facility: CLINIC | Age: 64
End: 2024-07-24

## 2024-07-24 NOTE — TELEPHONE ENCOUNTER
Attempted to reach patient to reschedule appointment on 08/09/24, however no answer, left message on patient voice-mail.

## 2024-07-28 DIAGNOSIS — I25.10 CORONARY ARTERY DISEASE INVOLVING NATIVE CORONARY ARTERY OF NATIVE HEART WITHOUT ANGINA PECTORIS: ICD-10-CM

## 2024-07-29 NOTE — TELEPHONE ENCOUNTER
Patient has 3 month follow up schedule, medication was sent in last on 4/16/24 for 90 no refills, please review and sign if appropriate.

## 2024-07-30 RX ORDER — ROSUVASTATIN CALCIUM 10 MG/1
10 TABLET, COATED ORAL DAILY
Qty: 90 TABLET | Refills: 1 | Status: SHIPPED | OUTPATIENT
Start: 2024-07-30

## 2024-08-22 ENCOUNTER — COMMUNITY OUTREACH (OUTPATIENT)
Facility: CLINIC | Age: 64
End: 2024-08-22

## 2024-10-21 DIAGNOSIS — F33.1 MODERATE EPISODE OF RECURRENT MAJOR DEPRESSIVE DISORDER (HCC): ICD-10-CM

## 2024-10-22 RX ORDER — SERTRALINE HYDROCHLORIDE 100 MG/1
100 TABLET, FILM COATED ORAL DAILY
Qty: 90 TABLET | Refills: 1 | OUTPATIENT
Start: 2024-10-22

## 2024-10-22 NOTE — TELEPHONE ENCOUNTER
Patient is overdue for her follow up and labs. Medication was sent in last on 4/16/24 for 90 with 1 refill. Patient will need an appointment for her refills, will send [employer] message asking patient to schedule for her labs and follow up visit.

## 2024-10-30 ENCOUNTER — TELEPHONE (OUTPATIENT)
Facility: CLINIC | Age: 64
End: 2024-10-30

## 2024-10-30 NOTE — TELEPHONE ENCOUNTER
Attempted to contact patient to reschedule welcome to medicare visit with no success.  Please review and advise as needed.

## 2024-11-03 DIAGNOSIS — F33.1 MODERATE EPISODE OF RECURRENT MAJOR DEPRESSIVE DISORDER (HCC): ICD-10-CM

## 2024-11-04 RX ORDER — SERTRALINE HYDROCHLORIDE 100 MG/1
100 TABLET, FILM COATED ORAL DAILY
Qty: 90 TABLET | Refills: 1 | OUTPATIENT
Start: 2024-11-04

## 2024-12-04 ENCOUNTER — HOSPITAL ENCOUNTER (OUTPATIENT)
Facility: HOSPITAL | Age: 64
Setting detail: SPECIMEN
Discharge: HOME | End: 2024-12-07
Payer: MEDICARE

## 2024-12-04 ENCOUNTER — NURSE ONLY (OUTPATIENT)
Facility: CLINIC | Age: 64
End: 2024-12-04
Payer: MEDICARE

## 2024-12-04 DIAGNOSIS — E63.9 NUTRITIONAL DEFICIENCY: ICD-10-CM

## 2024-12-04 DIAGNOSIS — M81.8 OTHER OSTEOPOROSIS WITHOUT CURRENT PATHOLOGICAL FRACTURE: ICD-10-CM

## 2024-12-04 DIAGNOSIS — D50.8 OTHER IRON DEFICIENCY ANEMIA: ICD-10-CM

## 2024-12-04 DIAGNOSIS — E55.9 VITAMIN D DEFICIENCY: ICD-10-CM

## 2024-12-04 DIAGNOSIS — I25.10 CORONARY ARTERY DISEASE INVOLVING NATIVE CORONARY ARTERY OF NATIVE HEART WITHOUT ANGINA PECTORIS: ICD-10-CM

## 2024-12-04 DIAGNOSIS — F33.1 MODERATE EPISODE OF RECURRENT MAJOR DEPRESSIVE DISORDER (HCC): ICD-10-CM

## 2024-12-04 DIAGNOSIS — K21.9 GASTROESOPHAGEAL REFLUX DISEASE WITHOUT ESOPHAGITIS: Primary | ICD-10-CM

## 2024-12-04 LAB
25(OH)D3 SERPL-MCNC: 34.7 NG/ML (ref 30–100)
ANION GAP SERPL CALC-SCNC: 5 MMOL/L (ref 3–18)
BUN SERPL-MCNC: 23 MG/DL (ref 7–18)
BUN/CREAT SERPL: 27 (ref 12–20)
CALCIUM SERPL-MCNC: 9.4 MG/DL (ref 8.5–10.1)
CHLORIDE SERPL-SCNC: 109 MMOL/L (ref 100–111)
CO2 SERPL-SCNC: 26 MMOL/L (ref 21–32)
CREAT SERPL-MCNC: 0.86 MG/DL (ref 0.6–1.3)
ERYTHROCYTE [DISTWIDTH] IN BLOOD BY AUTOMATED COUNT: 16.8 % (ref 11.6–14.5)
FERRITIN SERPL-MCNC: 20 NG/ML (ref 8–388)
GLUCOSE SERPL-MCNC: 97 MG/DL (ref 74–99)
HCT VFR BLD AUTO: 43.1 % (ref 35–45)
HGB BLD-MCNC: 12.5 G/DL (ref 12–16)
IRON SATN MFR SERPL: 16 % (ref 20–50)
IRON SERPL-MCNC: 66 UG/DL (ref 50–175)
MCH RBC QN AUTO: 19.7 PG (ref 24–34)
MCHC RBC AUTO-ENTMCNC: 29 G/DL (ref 31–37)
MCV RBC AUTO: 68.1 FL (ref 78–100)
NRBC # BLD: 0 K/UL (ref 0–0.01)
NRBC BLD-RTO: 0 PER 100 WBC
PLATELET # BLD AUTO: 201 K/UL (ref 135–420)
PMV BLD AUTO: 11.4 FL (ref 9.2–11.8)
POTASSIUM SERPL-SCNC: 4.4 MMOL/L (ref 3.5–5.5)
RBC # BLD AUTO: 6.33 M/UL (ref 4.2–5.3)
SODIUM SERPL-SCNC: 140 MMOL/L (ref 136–145)
TIBC SERPL-MCNC: 424 UG/DL (ref 250–450)
WBC # BLD AUTO: 5.6 K/UL (ref 4.6–13.2)

## 2024-12-04 PROCEDURE — 85027 COMPLETE CBC AUTOMATED: CPT

## 2024-12-04 PROCEDURE — 80048 BASIC METABOLIC PNL TOTAL CA: CPT

## 2024-12-04 PROCEDURE — 83540 ASSAY OF IRON: CPT

## 2024-12-04 PROCEDURE — 36415 COLL VENOUS BLD VENIPUNCTURE: CPT | Performed by: FAMILY MEDICINE

## 2024-12-04 PROCEDURE — 83550 IRON BINDING TEST: CPT

## 2024-12-04 PROCEDURE — 82728 ASSAY OF FERRITIN: CPT

## 2024-12-04 PROCEDURE — 36415 COLL VENOUS BLD VENIPUNCTURE: CPT

## 2024-12-04 PROCEDURE — 82306 VITAMIN D 25 HYDROXY: CPT

## 2024-12-04 NOTE — TELEPHONE ENCOUNTER
Patient was here today for her NV and states she will run out of her Zooloft before her scheduled visit. Medication pended please review and sign if appropriate.

## 2024-12-06 RX ORDER — SERTRALINE HYDROCHLORIDE 100 MG/1
100 TABLET, FILM COATED ORAL DAILY
Qty: 30 TABLET | Refills: 0 | Status: SHIPPED | OUTPATIENT
Start: 2024-12-06

## 2025-02-10 DIAGNOSIS — F33.1 MODERATE EPISODE OF RECURRENT MAJOR DEPRESSIVE DISORDER (HCC): ICD-10-CM

## 2025-02-11 RX ORDER — SERTRALINE HYDROCHLORIDE 100 MG/1
100 TABLET, FILM COATED ORAL DAILY
Qty: 60 TABLET | Refills: 0 | Status: SHIPPED | OUTPATIENT
Start: 2025-02-11

## 2025-03-30 SDOH — ECONOMIC STABILITY: INCOME INSECURITY: IN THE LAST 12 MONTHS, WAS THERE A TIME WHEN YOU WERE NOT ABLE TO PAY THE MORTGAGE OR RENT ON TIME?: NO

## 2025-03-30 SDOH — ECONOMIC STABILITY: FOOD INSECURITY: WITHIN THE PAST 12 MONTHS, YOU WORRIED THAT YOUR FOOD WOULD RUN OUT BEFORE YOU GOT MONEY TO BUY MORE.: NEVER TRUE

## 2025-03-30 SDOH — ECONOMIC STABILITY: FOOD INSECURITY: WITHIN THE PAST 12 MONTHS, THE FOOD YOU BOUGHT JUST DIDN'T LAST AND YOU DIDN'T HAVE MONEY TO GET MORE.: NEVER TRUE

## 2025-03-30 SDOH — ECONOMIC STABILITY: TRANSPORTATION INSECURITY
IN THE PAST 12 MONTHS, HAS LACK OF TRANSPORTATION KEPT YOU FROM MEETINGS, WORK, OR FROM GETTING THINGS NEEDED FOR DAILY LIVING?: NO

## 2025-03-30 SDOH — ECONOMIC STABILITY: TRANSPORTATION INSECURITY
IN THE PAST 12 MONTHS, HAS THE LACK OF TRANSPORTATION KEPT YOU FROM MEDICAL APPOINTMENTS OR FROM GETTING MEDICATIONS?: NO

## 2025-04-01 ENCOUNTER — OFFICE VISIT (OUTPATIENT)
Facility: CLINIC | Age: 65
End: 2025-04-01
Payer: MEDICARE

## 2025-04-01 VITALS
HEART RATE: 83 BPM | HEIGHT: 66 IN | BODY MASS INDEX: 41.46 KG/M2 | SYSTOLIC BLOOD PRESSURE: 118 MMHG | TEMPERATURE: 97.3 F | WEIGHT: 258 LBS | DIASTOLIC BLOOD PRESSURE: 64 MMHG | OXYGEN SATURATION: 96 %

## 2025-04-01 DIAGNOSIS — Z12.31 ENCOUNTER FOR SCREENING MAMMOGRAM FOR BREAST CANCER: ICD-10-CM

## 2025-04-01 DIAGNOSIS — I25.10 CORONARY ARTERY DISEASE INVOLVING NATIVE CORONARY ARTERY OF NATIVE HEART WITHOUT ANGINA PECTORIS: Primary | ICD-10-CM

## 2025-04-01 DIAGNOSIS — E55.9 VITAMIN D DEFICIENCY: ICD-10-CM

## 2025-04-01 DIAGNOSIS — E63.9 NUTRITIONAL DEFICIENCY: ICD-10-CM

## 2025-04-01 DIAGNOSIS — K21.9 GASTROESOPHAGEAL REFLUX DISEASE WITHOUT ESOPHAGITIS: ICD-10-CM

## 2025-04-01 DIAGNOSIS — E66.813 OBESITY, CLASS 3: ICD-10-CM

## 2025-04-01 DIAGNOSIS — F33.1 MODERATE EPISODE OF RECURRENT MAJOR DEPRESSIVE DISORDER (HCC): ICD-10-CM

## 2025-04-01 DIAGNOSIS — M81.8 OTHER OSTEOPOROSIS WITHOUT CURRENT PATHOLOGICAL FRACTURE: ICD-10-CM

## 2025-04-01 DIAGNOSIS — D50.8 OTHER IRON DEFICIENCY ANEMIA: ICD-10-CM

## 2025-04-01 DIAGNOSIS — H10.13 ALLERGIC CONJUNCTIVITIS OF BOTH EYES AND RHINITIS: ICD-10-CM

## 2025-04-01 DIAGNOSIS — J30.9 ALLERGIC CONJUNCTIVITIS OF BOTH EYES AND RHINITIS: ICD-10-CM

## 2025-04-01 PROCEDURE — 1159F MED LIST DOCD IN RCRD: CPT | Performed by: FAMILY MEDICINE

## 2025-04-01 PROCEDURE — G8417 CALC BMI ABV UP PARAM F/U: HCPCS | Performed by: FAMILY MEDICINE

## 2025-04-01 PROCEDURE — 1036F TOBACCO NON-USER: CPT | Performed by: FAMILY MEDICINE

## 2025-04-01 PROCEDURE — 1123F ACP DISCUSS/DSCN MKR DOCD: CPT | Performed by: FAMILY MEDICINE

## 2025-04-01 PROCEDURE — 99214 OFFICE O/P EST MOD 30 MIN: CPT | Performed by: FAMILY MEDICINE

## 2025-04-01 PROCEDURE — G8399 PT W/DXA RESULTS DOCUMENT: HCPCS | Performed by: FAMILY MEDICINE

## 2025-04-01 PROCEDURE — 3017F COLORECTAL CA SCREEN DOC REV: CPT | Performed by: FAMILY MEDICINE

## 2025-04-01 PROCEDURE — 1160F RVW MEDS BY RX/DR IN RCRD: CPT | Performed by: FAMILY MEDICINE

## 2025-04-01 PROCEDURE — G2211 COMPLEX E/M VISIT ADD ON: HCPCS | Performed by: FAMILY MEDICINE

## 2025-04-01 PROCEDURE — 1090F PRES/ABSN URINE INCON ASSESS: CPT | Performed by: FAMILY MEDICINE

## 2025-04-01 PROCEDURE — G8427 DOCREV CUR MEDS BY ELIG CLIN: HCPCS | Performed by: FAMILY MEDICINE

## 2025-04-01 RX ORDER — SERTRALINE HYDROCHLORIDE 100 MG/1
100 TABLET, FILM COATED ORAL DAILY
Qty: 90 TABLET | Refills: 3 | Status: SHIPPED | OUTPATIENT
Start: 2025-04-01

## 2025-04-01 ASSESSMENT — PATIENT HEALTH QUESTIONNAIRE - PHQ9
4. FEELING TIRED OR HAVING LITTLE ENERGY: NOT AT ALL
10. IF YOU CHECKED OFF ANY PROBLEMS, HOW DIFFICULT HAVE THESE PROBLEMS MADE IT FOR YOU TO DO YOUR WORK, TAKE CARE OF THINGS AT HOME, OR GET ALONG WITH OTHER PEOPLE: NOT DIFFICULT AT ALL
3. TROUBLE FALLING OR STAYING ASLEEP: NOT AT ALL
8. MOVING OR SPEAKING SO SLOWLY THAT OTHER PEOPLE COULD HAVE NOTICED. OR THE OPPOSITE, BEING SO FIGETY OR RESTLESS THAT YOU HAVE BEEN MOVING AROUND A LOT MORE THAN USUAL: NOT AT ALL
1. LITTLE INTEREST OR PLEASURE IN DOING THINGS: NEARLY EVERY DAY
9. THOUGHTS THAT YOU WOULD BE BETTER OFF DEAD, OR OF HURTING YOURSELF: NOT AT ALL
SUM OF ALL RESPONSES TO PHQ QUESTIONS 1-9: 4
2. FEELING DOWN, DEPRESSED OR HOPELESS: NOT AT ALL
SUM OF ALL RESPONSES TO PHQ QUESTIONS 1-9: 4
6. FEELING BAD ABOUT YOURSELF - OR THAT YOU ARE A FAILURE OR HAVE LET YOURSELF OR YOUR FAMILY DOWN: NOT AT ALL
SUM OF ALL RESPONSES TO PHQ QUESTIONS 1-9: 4
5. POOR APPETITE OR OVEREATING: SEVERAL DAYS
SUM OF ALL RESPONSES TO PHQ QUESTIONS 1-9: 4
7. TROUBLE CONCENTRATING ON THINGS, SUCH AS READING THE NEWSPAPER OR WATCHING TELEVISION: NOT AT ALL

## 2025-04-01 NOTE — PROGRESS NOTES
Chief Complaint   Patient presents with    Coronary Artery Disease    Gastroesophageal Reflux    Anemia    Depression    Vitamin D def     Patient here to be seen today for chronic condition follow up and lab review.            \"Have you been to the ER, urgent care clinic since your last visit?  Hospitalized since your last visit?\"    NO    “Have you seen or consulted any other health care providers outside our system since your last visit?”    NO    Have you had a mammogram?”   NO    Date of last Mammogram: 11/11/2020

## 2025-04-01 NOTE — PROGRESS NOTES
Denae Sorto (: 1960) is a 64 y.o. female, established patient, here for:    ASSESSMENT/PLAN:  Coronary artery disease involving native coronary artery of native heart without angina pectoris  Assessment & Plan:  Recent episode chest pain at rest, relieved with NTG, but no symptoms with exertion suggesting non cardiac possibility. Monitoring for now.   -Continue high potency statin, targeting LDL <70, and ASA.   Orders:  -     Lipid Panel; Future  Other osteoporosis without current pathological fracture  Assessment & Plan:  Untreated.   - continue Ca +  - optimizing Vit D  -weight bearing exercise  - Dental visit  - Follow up with Dr. Sena for uncontrolled GERD with history of esophagitis  - Likely will need IV therapy - decide after dental and GI visits   Moderate episode of recurrent major depressive disorder (HCC)  Assessment & Plan:  well controlled. Continue present management sertraline 100 mg.   Orders:  -     sertraline (ZOLOFT) 100 MG tablet; Take 1 tablet by mouth daily, Disp-90 tablet, R-3Normal  Other iron deficiency anemia  Assessment & Plan:  history of gastric bypass. Uncontrolled   - double Gentle iron  Orders:  -     Ferritin; Future  -     CBC with Auto Differential; Future  -     Iron and TIBC; Future  Vitamin D deficiency  Assessment & Plan:  Context osteoporosis, target 50-60. Has been taking D3 51442 units since 25.  - decrease to D3 5000 daily  - reassess 2-3 months  Orders:  -     Vitamin D 25 Hydroxy; Future  Nutritional deficiency  Encounter for screening mammogram for breast cancer  -     [facility] MANJIT DIGITAL SCREEN BILATERAL [[id number]]; Future  Gastroesophageal reflux disease without esophagitis  Assessment & Plan:  Uncontrolled on protonix 80 mg bid  - follow up with Dr. Sena   Body mass index [BMI] 40.0-44.9, adult (Z68.41)  Assessment & Plan:  Post gastric bypass. Uncontrolled. Therapeutic lifestyle interventions. May consider med management after optimization of

## 2025-04-01 NOTE — ASSESSMENT & PLAN NOTE
Recent episode chest pain at rest, relieved with NTG, but no symptoms with exertion suggesting non cardiac possibility. Monitoring for now.   -Continue high potency statin, targeting LDL <70, and ASA.

## 2025-04-01 NOTE — ASSESSMENT & PLAN NOTE
Context osteoporosis, target 50-60. Has been taking D3 63060 units since 1/1/25.  - decrease to D3 5000 daily  - reassess 2-3 months

## 2025-05-12 DIAGNOSIS — I25.10 CORONARY ARTERY DISEASE INVOLVING NATIVE CORONARY ARTERY OF NATIVE HEART WITHOUT ANGINA PECTORIS: ICD-10-CM

## 2025-05-13 RX ORDER — ROSUVASTATIN CALCIUM 10 MG/1
10 TABLET, COATED ORAL DAILY
Qty: 90 TABLET | Refills: 0 | Status: SHIPPED | OUTPATIENT
Start: 2025-05-13

## 2025-05-13 NOTE — TELEPHONE ENCOUNTER
Patient has follow up scheduled, medication was sent in last 7/30/24 90 with one refill. Please review.

## 2025-08-23 DIAGNOSIS — F33.1 MODERATE EPISODE OF RECURRENT MAJOR DEPRESSIVE DISORDER (HCC): ICD-10-CM

## 2025-08-23 DIAGNOSIS — E55.9 VITAMIN D DEFICIENCY: ICD-10-CM

## 2025-08-23 DIAGNOSIS — I25.10 CORONARY ARTERY DISEASE INVOLVING NATIVE CORONARY ARTERY OF NATIVE HEART WITHOUT ANGINA PECTORIS: ICD-10-CM

## 2025-08-23 DIAGNOSIS — K21.9 GASTROESOPHAGEAL REFLUX DISEASE WITHOUT ESOPHAGITIS: ICD-10-CM

## 2025-08-26 RX ORDER — ROSUVASTATIN CALCIUM 10 MG/1
10 TABLET, COATED ORAL DAILY
Qty: 90 TABLET | Refills: 0 | Status: SHIPPED | OUTPATIENT
Start: 2025-08-26

## 2025-08-26 RX ORDER — SERTRALINE HYDROCHLORIDE 100 MG/1
100 TABLET, FILM COATED ORAL DAILY
Qty: 90 TABLET | Refills: 0 | Status: SHIPPED | OUTPATIENT
Start: 2025-08-26

## 2025-08-26 RX ORDER — PANTOPRAZOLE SODIUM 40 MG/1
80 TABLET, DELAYED RELEASE ORAL 2 TIMES DAILY
Qty: 360 TABLET | Refills: 0 | Status: SHIPPED | OUTPATIENT
Start: 2025-08-26

## 2025-08-26 RX ORDER — BUTYROSPERMUM PARKII(SHEA BUTTER), SIMMONDSIA CHINENSIS (JOJOBA) SEED OIL, ALOE BARBADENSIS LEAF EXTRACT .01; 1; 3.5 G/100G; G/100G; G/100G
5000 LIQUID TOPICAL DAILY
Qty: 90 CAPSULE | Refills: 0 | Status: SHIPPED | OUTPATIENT
Start: 2025-08-26